# Patient Record
Sex: FEMALE | Race: WHITE | Employment: PART TIME | URBAN - METROPOLITAN AREA
[De-identification: names, ages, dates, MRNs, and addresses within clinical notes are randomized per-mention and may not be internally consistent; named-entity substitution may affect disease eponyms.]

---

## 2017-02-06 ENCOUNTER — OFFICE VISIT (OUTPATIENT)
Dept: FAMILY MEDICINE CLINIC | Age: 57
End: 2017-02-06

## 2017-02-06 VITALS
HEART RATE: 109 BPM | BODY MASS INDEX: 30.35 KG/M2 | RESPIRATION RATE: 16 BRPM | SYSTOLIC BLOOD PRESSURE: 136 MMHG | HEIGHT: 70 IN | DIASTOLIC BLOOD PRESSURE: 84 MMHG | TEMPERATURE: 99.3 F | WEIGHT: 212 LBS

## 2017-02-06 DIAGNOSIS — Z12.31 ENCOUNTER FOR SCREENING MAMMOGRAM FOR BREAST CANCER: ICD-10-CM

## 2017-02-06 DIAGNOSIS — Z11.59 NEED FOR HEPATITIS C SCREENING TEST: ICD-10-CM

## 2017-02-06 DIAGNOSIS — E04.9 ENLARGED THYROID: ICD-10-CM

## 2017-02-06 DIAGNOSIS — Z00.00 ANNUAL PHYSICAL EXAM: Primary | ICD-10-CM

## 2017-02-06 DIAGNOSIS — Z13.220 SCREENING CHOLESTEROL LEVEL: ICD-10-CM

## 2017-02-06 DIAGNOSIS — Z11.4 SCREENING FOR HIV WITHOUT PRESENCE OF RISK FACTORS: ICD-10-CM

## 2017-02-06 DIAGNOSIS — R00.2 PALPITATIONS: ICD-10-CM

## 2017-02-06 DIAGNOSIS — Z13.1 SCREENING FOR DIABETES MELLITUS: ICD-10-CM

## 2017-02-06 LAB
ALBUMIN SERPL-MCNC: 4.4 G/DL (ref 3.9–4.9)
ALP BLD-CCNC: 66 U/L (ref 40–130)
ALT SERPL-CCNC: 13 U/L (ref 0–33)
ANION GAP SERPL CALCULATED.3IONS-SCNC: 12 MEQ/L (ref 7–13)
AST SERPL-CCNC: 15 U/L (ref 0–35)
BILIRUB SERPL-MCNC: 0.3 MG/DL (ref 0–1.2)
BUN BLDV-MCNC: 24 MG/DL (ref 6–20)
CALCIUM SERPL-MCNC: 10.1 MG/DL (ref 8.6–10.2)
CHLORIDE BLD-SCNC: 105 MEQ/L (ref 98–107)
CHOLESTEROL, TOTAL: 209 MG/DL (ref 0–199)
CO2: 25 MEQ/L (ref 22–29)
CREAT SERPL-MCNC: 0.76 MG/DL (ref 0.5–0.9)
GFR AFRICAN AMERICAN: >60
GFR NON-AFRICAN AMERICAN: >60
GLOBULIN: 3 G/DL (ref 2.3–3.5)
GLUCOSE BLD-MCNC: 123 MG/DL (ref 74–109)
HDLC SERPL-MCNC: 69 MG/DL (ref 40–59)
HEPATITIS C ANTIBODY INTERPRETATION: NORMAL
LDL CHOLESTEROL CALCULATED: 115 MG/DL (ref 0–129)
POTASSIUM SERPL-SCNC: 4.1 MEQ/L (ref 3.5–5.1)
SODIUM BLD-SCNC: 142 MEQ/L (ref 132–144)
TOTAL PROTEIN: 7.4 G/DL (ref 6.4–8.1)
TRIGL SERPL-MCNC: 123 MG/DL (ref 0–200)
TSH REFLEX: 1.88 UIU/ML (ref 0.27–4.2)

## 2017-02-06 PROCEDURE — 99386 PREV VISIT NEW AGE 40-64: CPT | Performed by: NURSE PRACTITIONER

## 2017-02-06 RX ORDER — NAPROXEN 500 MG/1
TABLET ORAL
Refills: 1 | COMMUNITY
Start: 2017-02-02 | End: 2017-12-26 | Stop reason: SDUPTHER

## 2017-02-06 ASSESSMENT — ENCOUNTER SYMPTOMS
ALLERGIC/IMMUNOLOGIC NEGATIVE: 1
GASTROINTESTINAL NEGATIVE: 1
WHEEZING: 0
BLOOD IN STOOL: 0
ABDOMINAL PAIN: 0
CONSTIPATION: 0
TROUBLE SWALLOWING: 0
ANAL BLEEDING: 0
SHORTNESS OF BREATH: 0
EYES NEGATIVE: 1
RECTAL PAIN: 0
CHEST TIGHTNESS: 0
COLOR CHANGE: 0
RESPIRATORY NEGATIVE: 1
VOICE CHANGE: 0
DIARRHEA: 0

## 2017-02-07 LAB
HIV-1 AND HIV-2 ANTIBODIES: NEGATIVE
THYROID PEROXIDASE (TPO) ABS: 1.1 IU/ML (ref 0–9)

## 2017-02-16 ENCOUNTER — HOSPITAL ENCOUNTER (OUTPATIENT)
Dept: WOMENS IMAGING | Age: 57
Discharge: HOME | End: 2017-02-16
Payer: COMMERCIAL

## 2017-02-16 ENCOUNTER — HOSPITAL ENCOUNTER (OUTPATIENT)
Dept: ULTRASOUND IMAGING | Age: 57
Discharge: HOME | End: 2017-02-16
Payer: COMMERCIAL

## 2017-02-16 DIAGNOSIS — E04.9 ENLARGED THYROID: ICD-10-CM

## 2017-02-16 DIAGNOSIS — Z12.31 ENCOUNTER FOR SCREENING MAMMOGRAM FOR BREAST CANCER: ICD-10-CM

## 2017-02-16 PROCEDURE — 76536 US EXAM OF HEAD AND NECK: CPT

## 2017-02-16 PROCEDURE — G0202 SCR MAMMO BI INCL CAD: HCPCS

## 2017-03-06 ENCOUNTER — OFFICE VISIT (OUTPATIENT)
Dept: FAMILY MEDICINE CLINIC | Age: 57
End: 2017-03-06

## 2017-03-06 VITALS
HEIGHT: 70 IN | HEART RATE: 72 BPM | WEIGHT: 210 LBS | DIASTOLIC BLOOD PRESSURE: 80 MMHG | BODY MASS INDEX: 30.06 KG/M2 | TEMPERATURE: 98.3 F | SYSTOLIC BLOOD PRESSURE: 138 MMHG | RESPIRATION RATE: 16 BRPM

## 2017-03-06 DIAGNOSIS — K63.5 COLON POLYPS: ICD-10-CM

## 2017-03-06 DIAGNOSIS — Z12.4 CERVICAL CANCER SCREENING: ICD-10-CM

## 2017-03-06 DIAGNOSIS — R73.03 PREDIABETES: ICD-10-CM

## 2017-03-06 DIAGNOSIS — E78.2 MIXED HYPERLIPIDEMIA: ICD-10-CM

## 2017-03-06 DIAGNOSIS — Z12.4 CERVICAL CANCER SCREENING: Primary | ICD-10-CM

## 2017-03-06 PROCEDURE — 99396 PREV VISIT EST AGE 40-64: CPT | Performed by: FAMILY MEDICINE

## 2017-03-14 LAB
HPV COMMENT: ABNORMAL
HPV TYPE 16: NOT DETECTED
HPV TYPE 18: NOT DETECTED
HPVOH (OTHER TYPES): DETECTED

## 2017-05-26 DIAGNOSIS — R73.03 PREDIABETES: Primary | ICD-10-CM

## 2017-05-30 DIAGNOSIS — R73.03 PREDIABETES: ICD-10-CM

## 2017-05-30 LAB
ANION GAP SERPL CALCULATED.3IONS-SCNC: 13 MEQ/L (ref 7–13)
BUN BLDV-MCNC: 16 MG/DL (ref 6–20)
CALCIUM SERPL-MCNC: 9.3 MG/DL (ref 8.6–10.2)
CHLORIDE BLD-SCNC: 107 MEQ/L (ref 98–107)
CO2: 23 MEQ/L (ref 22–29)
CREAT SERPL-MCNC: 0.61 MG/DL (ref 0.5–0.9)
GFR AFRICAN AMERICAN: >60
GFR NON-AFRICAN AMERICAN: >60
GLUCOSE BLD-MCNC: 84 MG/DL (ref 74–109)
INSULIN: 6.4 UIU/ML (ref 2.6–24.9)
POTASSIUM SERPL-SCNC: 4 MEQ/L (ref 3.5–5.1)
SODIUM BLD-SCNC: 143 MEQ/L (ref 132–144)

## 2017-06-05 ENCOUNTER — OFFICE VISIT (OUTPATIENT)
Dept: FAMILY MEDICINE CLINIC | Age: 57
End: 2017-06-05

## 2017-06-05 VITALS
RESPIRATION RATE: 16 BRPM | WEIGHT: 199 LBS | TEMPERATURE: 98.6 F | HEIGHT: 70 IN | BODY MASS INDEX: 28.49 KG/M2 | DIASTOLIC BLOOD PRESSURE: 78 MMHG | HEART RATE: 84 BPM | SYSTOLIC BLOOD PRESSURE: 134 MMHG

## 2017-06-05 DIAGNOSIS — E78.2 MIXED HYPERLIPIDEMIA: Primary | ICD-10-CM

## 2017-06-05 DIAGNOSIS — M17.11 PRIMARY OSTEOARTHRITIS OF RIGHT KNEE: ICD-10-CM

## 2017-06-05 DIAGNOSIS — R73.03 PREDIABETES: ICD-10-CM

## 2017-06-05 PROCEDURE — 3017F COLORECTAL CA SCREEN DOC REV: CPT | Performed by: FAMILY MEDICINE

## 2017-06-05 PROCEDURE — 99214 OFFICE O/P EST MOD 30 MIN: CPT | Performed by: FAMILY MEDICINE

## 2017-06-05 PROCEDURE — G8417 CALC BMI ABV UP PARAM F/U: HCPCS | Performed by: FAMILY MEDICINE

## 2017-06-05 PROCEDURE — 1036F TOBACCO NON-USER: CPT | Performed by: FAMILY MEDICINE

## 2017-06-05 PROCEDURE — 3014F SCREEN MAMMO DOC REV: CPT | Performed by: FAMILY MEDICINE

## 2017-06-05 PROCEDURE — G8427 DOCREV CUR MEDS BY ELIG CLIN: HCPCS | Performed by: FAMILY MEDICINE

## 2017-06-05 ASSESSMENT — PATIENT HEALTH QUESTIONNAIRE - PHQ9
1. LITTLE INTEREST OR PLEASURE IN DOING THINGS: 0
SUM OF ALL RESPONSES TO PHQ QUESTIONS 1-9: 0
2. FEELING DOWN, DEPRESSED OR HOPELESS: 0
SUM OF ALL RESPONSES TO PHQ9 QUESTIONS 1 & 2: 0

## 2017-09-01 ENCOUNTER — PATIENT MESSAGE (OUTPATIENT)
Dept: FAMILY MEDICINE CLINIC | Age: 57
End: 2017-09-01

## 2017-12-08 DIAGNOSIS — E78.2 MIXED HYPERLIPIDEMIA: ICD-10-CM

## 2017-12-08 DIAGNOSIS — R73.03 PREDIABETES: Primary | ICD-10-CM

## 2017-12-09 DIAGNOSIS — E78.2 MIXED HYPERLIPIDEMIA: ICD-10-CM

## 2017-12-09 DIAGNOSIS — R73.03 PREDIABETES: ICD-10-CM

## 2017-12-09 LAB
ALBUMIN SERPL-MCNC: 4.3 G/DL (ref 3.9–4.9)
ALP BLD-CCNC: 61 U/L (ref 40–130)
ALT SERPL-CCNC: 11 U/L (ref 0–33)
ANION GAP SERPL CALCULATED.3IONS-SCNC: 13 MEQ/L (ref 7–13)
AST SERPL-CCNC: 15 U/L (ref 0–35)
BASOPHILS ABSOLUTE: 0.1 K/UL (ref 0–0.2)
BASOPHILS RELATIVE PERCENT: 1.2 %
BILIRUB SERPL-MCNC: 0.2 MG/DL (ref 0–1.2)
BUN BLDV-MCNC: 21 MG/DL (ref 6–20)
CALCIUM SERPL-MCNC: 10.2 MG/DL (ref 8.6–10.2)
CHLORIDE BLD-SCNC: 105 MEQ/L (ref 98–107)
CHOLESTEROL, TOTAL: 211 MG/DL (ref 0–199)
CO2: 27 MEQ/L (ref 22–29)
CREAT SERPL-MCNC: 0.83 MG/DL (ref 0.5–0.9)
EOSINOPHILS ABSOLUTE: 0.1 K/UL (ref 0–0.7)
EOSINOPHILS RELATIVE PERCENT: 2.4 %
GFR AFRICAN AMERICAN: >60
GFR NON-AFRICAN AMERICAN: >60
GLOBULIN: 2.7 G/DL (ref 2.3–3.5)
GLUCOSE BLD-MCNC: 80 MG/DL (ref 74–109)
HBA1C MFR BLD: 5.5 % (ref 4.8–5.9)
HCT VFR BLD CALC: 41.8 % (ref 37–47)
HDLC SERPL-MCNC: 58 MG/DL (ref 40–59)
HEMOGLOBIN: 13.8 G/DL (ref 12–16)
LDL CHOLESTEROL CALCULATED: 138 MG/DL (ref 0–129)
LYMPHOCYTES ABSOLUTE: 1.9 K/UL (ref 1–4.8)
LYMPHOCYTES RELATIVE PERCENT: 33.2 %
MCH RBC QN AUTO: 30.7 PG (ref 27–31.3)
MCHC RBC AUTO-ENTMCNC: 33.1 % (ref 33–37)
MCV RBC AUTO: 92.9 FL (ref 82–100)
MONOCYTES ABSOLUTE: 0.3 K/UL (ref 0.2–0.8)
MONOCYTES RELATIVE PERCENT: 5.1 %
NEUTROPHILS ABSOLUTE: 3.4 K/UL (ref 1.4–6.5)
NEUTROPHILS RELATIVE PERCENT: 58.1 %
PDW BLD-RTO: 14.7 % (ref 11.5–14.5)
PLATELET # BLD: 305 K/UL (ref 130–400)
POTASSIUM SERPL-SCNC: 4.8 MEQ/L (ref 3.5–5.1)
RBC # BLD: 4.5 M/UL (ref 4.2–5.4)
SODIUM BLD-SCNC: 145 MEQ/L (ref 132–144)
TOTAL PROTEIN: 7 G/DL (ref 6.4–8.1)
TRIGL SERPL-MCNC: 73 MG/DL (ref 0–200)
WBC # BLD: 5.9 K/UL (ref 4.8–10.8)

## 2017-12-11 ENCOUNTER — OFFICE VISIT (OUTPATIENT)
Dept: FAMILY MEDICINE CLINIC | Age: 57
End: 2017-12-11

## 2017-12-11 VITALS
TEMPERATURE: 97.9 F | HEART RATE: 78 BPM | HEIGHT: 70 IN | SYSTOLIC BLOOD PRESSURE: 124 MMHG | DIASTOLIC BLOOD PRESSURE: 76 MMHG | WEIGHT: 200 LBS | RESPIRATION RATE: 16 BRPM | BODY MASS INDEX: 28.63 KG/M2

## 2017-12-11 DIAGNOSIS — E78.2 MIXED HYPERLIPIDEMIA: Primary | ICD-10-CM

## 2017-12-11 DIAGNOSIS — R73.03 PREDIABETES: ICD-10-CM

## 2017-12-11 DIAGNOSIS — Z12.31 VISIT FOR SCREENING MAMMOGRAM: ICD-10-CM

## 2017-12-11 PROCEDURE — G8427 DOCREV CUR MEDS BY ELIG CLIN: HCPCS | Performed by: FAMILY MEDICINE

## 2017-12-11 PROCEDURE — 3017F COLORECTAL CA SCREEN DOC REV: CPT | Performed by: FAMILY MEDICINE

## 2017-12-11 PROCEDURE — 99214 OFFICE O/P EST MOD 30 MIN: CPT | Performed by: FAMILY MEDICINE

## 2017-12-11 PROCEDURE — 1036F TOBACCO NON-USER: CPT | Performed by: FAMILY MEDICINE

## 2017-12-11 PROCEDURE — G8419 CALC BMI OUT NRM PARAM NOF/U: HCPCS | Performed by: FAMILY MEDICINE

## 2017-12-11 PROCEDURE — G8484 FLU IMMUNIZE NO ADMIN: HCPCS | Performed by: FAMILY MEDICINE

## 2017-12-11 PROCEDURE — 3014F SCREEN MAMMO DOC REV: CPT | Performed by: FAMILY MEDICINE

## 2017-12-11 NOTE — PROGRESS NOTES
Risk    NCEP Guidelines: Third Report May 2001  >59 = negative risk factor for CHD  <40 = major risk factor for CHD      LDL Calculated 12/09/2017 138* 0 - 129 mg/dL Final    WBC 12/09/2017 5.9  4.8 - 10.8 K/uL Final    RBC 12/09/2017 4.50  4. 20 - 5.40 M/uL Final    Hemoglobin 12/09/2017 13.8  12.0 - 16.0 g/dL Final    Hematocrit 12/09/2017 41.8  37.0 - 47.0 % Final    MCV 12/09/2017 92.9  82.0 - 100.0 fL Final    MCH 12/09/2017 30.7  27.0 - 31.3 pg Final    MCHC 12/09/2017 33.1  33.0 - 37.0 % Final    RDW 12/09/2017 14.7* 11.5 - 14.5 % Final    Platelets 75/05/8185 305  130 - 400 K/uL Final    Neutrophils % 12/09/2017 58.1  % Final    Lymphocytes % 12/09/2017 33.2  % Final    Monocytes % 12/09/2017 5.1  % Final    Eosinophils % 12/09/2017 2.4  % Final    Basophils % 12/09/2017 1.2  % Final    Neutrophils # 12/09/2017 3.4  1.4 - 6.5 K/uL Final    Lymphocytes # 12/09/2017 1.9  1.0 - 4.8 K/uL Final    Monocytes # 12/09/2017 0.3  0.2 - 0.8 K/uL Final    Eosinophils # 12/09/2017 0.1  0.0 - 0.7 K/uL Final    Basophils # 12/09/2017 0.1  0.0 - 0.2 K/uL Final    Hemoglobin A1C 12/09/2017 5.5  4.8 - 5.9 % Final     Health Maintenance   Topic Date Due    DTaP/Tdap/Td vaccine (1 - Tdap) 06/11/2018 (Originally 3/26/1979)    Flu vaccine (1) 12/11/2018 (Originally 9/1/2017)    Breast cancer screen  02/16/2019    Cervical cancer screen  03/06/2022    Lipid screen  12/09/2022    Colon cancer screen colonoscopy  01/23/2027    Hepatitis C screen  Completed    HIV screen  Completed       No results found for this visit on 12/11/17. Objective    Vitals:    12/11/17 1124   BP: 124/76   Pulse: 78   Resp: 16   Temp: 97.9 °F (36.6 °C)   TempSrc: Oral   Weight: 200 lb (90.7 kg)   Height: 5' 10\" (1.778 m)       PHYSICAL EXAMINATION:        GENERAL:    The patient appears well nourished and well-developed,     Normal affect. Not appearing significantly anxious or depressed.     No acute respiratory

## 2017-12-26 ENCOUNTER — PATIENT MESSAGE (OUTPATIENT)
Dept: FAMILY MEDICINE CLINIC | Age: 57
End: 2017-12-26

## 2017-12-26 RX ORDER — NAPROXEN 500 MG/1
TABLET ORAL
Qty: 60 TABLET | Refills: 3 | Status: SHIPPED | OUTPATIENT
Start: 2017-12-26 | End: 2018-10-05 | Stop reason: SDUPTHER

## 2018-02-19 ENCOUNTER — HOSPITAL ENCOUNTER (OUTPATIENT)
Dept: WOMENS IMAGING | Age: 58
Discharge: HOME | End: 2018-02-21
Payer: COMMERCIAL

## 2018-02-19 DIAGNOSIS — Z12.31 VISIT FOR SCREENING MAMMOGRAM: ICD-10-CM

## 2018-02-19 PROCEDURE — 77063 BREAST TOMOSYNTHESIS BI: CPT

## 2018-03-02 ENCOUNTER — PATIENT MESSAGE (OUTPATIENT)
Dept: FAMILY MEDICINE CLINIC | Age: 58
End: 2018-03-02

## 2018-03-02 DIAGNOSIS — R73.03 PREDIABETES: Primary | ICD-10-CM

## 2018-06-26 ENCOUNTER — OFFICE VISIT (OUTPATIENT)
Dept: FAMILY MEDICINE CLINIC | Age: 58
End: 2018-06-26
Payer: COMMERCIAL

## 2018-06-26 VITALS
HEART RATE: 80 BPM | BODY MASS INDEX: 29.92 KG/M2 | WEIGHT: 209 LBS | TEMPERATURE: 97.4 F | HEIGHT: 70 IN | SYSTOLIC BLOOD PRESSURE: 140 MMHG | DIASTOLIC BLOOD PRESSURE: 82 MMHG | RESPIRATION RATE: 16 BRPM

## 2018-06-26 DIAGNOSIS — R73.03 PREDIABETES: ICD-10-CM

## 2018-06-26 DIAGNOSIS — M25.50 ARTHRALGIA, UNSPECIFIED JOINT: ICD-10-CM

## 2018-06-26 DIAGNOSIS — E78.2 MIXED HYPERLIPIDEMIA: ICD-10-CM

## 2018-06-26 DIAGNOSIS — R73.03 PREDIABETES: Primary | ICD-10-CM

## 2018-06-26 LAB
ALBUMIN SERPL-MCNC: 4.3 G/DL (ref 3.9–4.9)
ALP BLD-CCNC: 68 U/L (ref 40–130)
ALT SERPL-CCNC: 15 U/L (ref 0–33)
ANION GAP SERPL CALCULATED.3IONS-SCNC: 17 MEQ/L (ref 7–13)
AST SERPL-CCNC: 18 U/L (ref 0–35)
BILIRUB SERPL-MCNC: <0.2 MG/DL (ref 0–1.2)
BUN BLDV-MCNC: 21 MG/DL (ref 8–23)
CALCIUM SERPL-MCNC: 10.1 MG/DL (ref 8.6–10.2)
CHLORIDE BLD-SCNC: 103 MEQ/L (ref 98–107)
CHOLESTEROL, TOTAL: 225 MG/DL (ref 0–199)
CO2: 23 MEQ/L (ref 22–29)
CREAT SERPL-MCNC: 0.81 MG/DL (ref 0.5–0.9)
GFR AFRICAN AMERICAN: >60
GFR NON-AFRICAN AMERICAN: >60
GLOBULIN: 3.1 G/DL (ref 2.3–3.5)
GLUCOSE BLD-MCNC: 75 MG/DL (ref 74–109)
HBA1C MFR BLD: 5.5 % (ref 4.8–5.9)
HCT VFR BLD CALC: 41.1 % (ref 37–47)
HDLC SERPL-MCNC: 65 MG/DL (ref 40–59)
HEMOGLOBIN: 13.4 G/DL (ref 12–16)
LACTATE DEHYDROGENASE: 177 U/L (ref 135–214)
LDL CHOLESTEROL CALCULATED: 132 MG/DL (ref 0–129)
MCH RBC QN AUTO: 29.9 PG (ref 27–31.3)
MCHC RBC AUTO-ENTMCNC: 32.6 % (ref 33–37)
MCV RBC AUTO: 91.6 FL (ref 82–100)
PDW BLD-RTO: 14.2 % (ref 11.5–14.5)
PLATELET # BLD: 296 K/UL (ref 130–400)
POTASSIUM SERPL-SCNC: 3.8 MEQ/L (ref 3.5–5.1)
RBC # BLD: 4.49 M/UL (ref 4.2–5.4)
RHEUMATOID FACTOR: <10 IU/ML (ref 0–14)
SEDIMENTATION RATE, ERYTHROCYTE: 18 MM (ref 0–30)
SODIUM BLD-SCNC: 143 MEQ/L (ref 132–144)
T4 FREE: 1.26 NG/DL (ref 0.93–1.7)
TOTAL PROTEIN: 7.4 G/DL (ref 6.4–8.1)
TRIGL SERPL-MCNC: 139 MG/DL (ref 0–200)
TSH SERPL DL<=0.05 MIU/L-ACNC: 2.22 UIU/ML (ref 0.27–4.2)
WBC # BLD: 5.7 K/UL (ref 4.8–10.8)

## 2018-06-26 PROCEDURE — G8427 DOCREV CUR MEDS BY ELIG CLIN: HCPCS | Performed by: FAMILY MEDICINE

## 2018-06-26 PROCEDURE — 1036F TOBACCO NON-USER: CPT | Performed by: FAMILY MEDICINE

## 2018-06-26 PROCEDURE — 3017F COLORECTAL CA SCREEN DOC REV: CPT | Performed by: FAMILY MEDICINE

## 2018-06-26 PROCEDURE — G8419 CALC BMI OUT NRM PARAM NOF/U: HCPCS | Performed by: FAMILY MEDICINE

## 2018-06-26 PROCEDURE — 99214 OFFICE O/P EST MOD 30 MIN: CPT | Performed by: FAMILY MEDICINE

## 2018-06-26 ASSESSMENT — PATIENT HEALTH QUESTIONNAIRE - PHQ9
SUM OF ALL RESPONSES TO PHQ9 QUESTIONS 1 & 2: 1
2. FEELING DOWN, DEPRESSED OR HOPELESS: 1
SUM OF ALL RESPONSES TO PHQ QUESTIONS 1-9: 1
1. LITTLE INTEREST OR PLEASURE IN DOING THINGS: 0

## 2018-06-27 LAB
ANA INTERPRETATION: NORMAL
ANTI-NUCLEAR ANTIBODY (ANA): NEGATIVE

## 2018-08-27 ENCOUNTER — OFFICE VISIT (OUTPATIENT)
Dept: FAMILY MEDICINE CLINIC | Age: 58
End: 2018-08-27
Payer: COMMERCIAL

## 2018-08-27 ENCOUNTER — PATIENT MESSAGE (OUTPATIENT)
Dept: FAMILY MEDICINE CLINIC | Age: 58
End: 2018-08-27

## 2018-08-27 VITALS
TEMPERATURE: 98.1 F | HEART RATE: 76 BPM | SYSTOLIC BLOOD PRESSURE: 118 MMHG | WEIGHT: 205 LBS | DIASTOLIC BLOOD PRESSURE: 64 MMHG | BODY MASS INDEX: 29.35 KG/M2 | RESPIRATION RATE: 16 BRPM | HEIGHT: 70 IN

## 2018-08-27 DIAGNOSIS — R73.03 PREDIABETES: Primary | ICD-10-CM

## 2018-08-27 DIAGNOSIS — E78.2 MIXED HYPERLIPIDEMIA: ICD-10-CM

## 2018-08-27 PROCEDURE — G8427 DOCREV CUR MEDS BY ELIG CLIN: HCPCS | Performed by: FAMILY MEDICINE

## 2018-08-27 PROCEDURE — 3017F COLORECTAL CA SCREEN DOC REV: CPT | Performed by: FAMILY MEDICINE

## 2018-08-27 PROCEDURE — 99214 OFFICE O/P EST MOD 30 MIN: CPT | Performed by: FAMILY MEDICINE

## 2018-08-27 PROCEDURE — 1036F TOBACCO NON-USER: CPT | Performed by: FAMILY MEDICINE

## 2018-08-27 PROCEDURE — G8419 CALC BMI OUT NRM PARAM NOF/U: HCPCS | Performed by: FAMILY MEDICINE

## 2018-08-27 NOTE — PROGRESS NOTES
NICOLE  does occur in approximately 3-5% of patients with otherwise  typical SLE. Many of the latter patients have anti-SSA (Ro)  and/or anti-SSB(La)antibodies. If still clinically suspicious of SLE and the NICOLE is negative,  consider repeating the test in 3-6 months, particularly if  the clinical course changes. Sjogren's syndrome, scleroderma, rheumatoid arthritis, and  dermato/polymyositis cannot be excluded on the basis of a  negative NICOLE test.  Tests appropriate for those disorders  should be considered if the clinical history, symptoms, and  physical findings are consistent with these diagnoses. Anti-SSA and anti-SSB are helpful in the evaluation of  Sjogren's syndrome, anti-Scl-70 for scleroderma, and  anti-Felisa-1 for dermato/polymyositis.  WBC 06/26/2018 5.7  4.8 - 10.8 K/uL Final    RBC 06/26/2018 4.49  4.20 - 5.40 M/uL Final    Hemoglobin 06/26/2018 13.4  12.0 - 16.0 g/dL Final    Hematocrit 06/26/2018 41.1  37.0 - 47.0 % Final    MCV 06/26/2018 91.6  82.0 - 100.0 fL Final    MCH 06/26/2018 29.9  27.0 - 31.3 pg Final    MCHC 06/26/2018 32.6* 33.0 - 37.0 % Final    RDW 06/26/2018 14.2  11.5 - 14.5 % Final    Platelets 44/64/6547 296  130 - 400 K/uL Final    Sodium 06/26/2018 143  132 - 144 mEq/L Final    Potassium 06/26/2018 3.8  3.5 - 5.1 mEq/L Final    Chloride 06/26/2018 103  98 - 107 mEq/L Final    CO2 06/26/2018 23  22 - 29 mEq/L Final    Anion Gap 06/26/2018 17* 7 - 13 mEq/L Final    Glucose 06/26/2018 75  74 - 109 mg/dL Final    BUN 06/26/2018 21  8 - 23 mg/dL Final    CREATININE 06/26/2018 0.81  0.50 - 0.90 mg/dL Final    GFR Non- 06/26/2018 >60.0  >60 Final    Comment: >60 mL/min/1.73m2 EGFR, calc. for ages 25 and older using the  MDRD formula (not corrected for weight), is valid for stable  renal function.       GFR  06/26/2018 >60.0  >60 Final    Comment: >60 mL/min/1.73m2 EGFR, calc. for ages 25 and older using the  MDRD formula (not

## 2018-08-28 NOTE — TELEPHONE ENCOUNTER
From: Allyssa Sterling  To: Pradeep Sewell MD  Sent: 8/27/2018 5:53 PM EDT  Subject: Visit Follow-Up Question    Hi  Is there a nutritionist that i can talk to about understanding the information about carb timing? Or perhaps some references online that you could point me to? I want to do more and understand more about this. Thank you! Chelseadionicio Mata.

## 2018-10-03 ENCOUNTER — PATIENT MESSAGE (OUTPATIENT)
Dept: FAMILY MEDICINE CLINIC | Age: 58
End: 2018-10-03

## 2018-10-04 NOTE — TELEPHONE ENCOUNTER
From: Alejandro Camacho  To: Marylou Rivera MD  Sent: 10/3/2018 10:13 PM EDT  Subject: Prescription Question    Can i get a refill on the naproxen? I only use it periodically.  Thank you

## 2018-10-05 RX ORDER — NAPROXEN 500 MG/1
TABLET ORAL
Qty: 60 TABLET | Refills: 3 | Status: SHIPPED | OUTPATIENT
Start: 2018-10-05 | End: 2019-06-06

## 2018-11-15 ENCOUNTER — NURSE ONLY (OUTPATIENT)
Dept: FAMILY MEDICINE CLINIC | Age: 58
End: 2018-11-15
Payer: COMMERCIAL

## 2018-11-15 DIAGNOSIS — Z23 NEED FOR INFLUENZA VACCINATION: Primary | ICD-10-CM

## 2018-11-15 DIAGNOSIS — Z23 NEED FOR TDAP VACCINATION: ICD-10-CM

## 2018-11-15 PROCEDURE — 90472 IMMUNIZATION ADMIN EACH ADD: CPT | Performed by: FAMILY MEDICINE

## 2018-11-15 PROCEDURE — 90471 IMMUNIZATION ADMIN: CPT | Performed by: FAMILY MEDICINE

## 2018-11-15 PROCEDURE — 90688 IIV4 VACCINE SPLT 0.5 ML IM: CPT | Performed by: FAMILY MEDICINE

## 2018-11-15 PROCEDURE — 90715 TDAP VACCINE 7 YRS/> IM: CPT | Performed by: FAMILY MEDICINE

## 2018-12-06 DIAGNOSIS — R73.03 PREDIABETES: ICD-10-CM

## 2018-12-06 DIAGNOSIS — E78.2 MIXED HYPERLIPIDEMIA: Primary | ICD-10-CM

## 2018-12-06 DIAGNOSIS — E78.2 MIXED HYPERLIPIDEMIA: ICD-10-CM

## 2018-12-06 LAB
ALBUMIN SERPL-MCNC: 4 G/DL (ref 3.9–4.9)
ALP BLD-CCNC: 63 U/L (ref 40–130)
ALT SERPL-CCNC: 12 U/L (ref 0–33)
ANION GAP SERPL CALCULATED.3IONS-SCNC: 12 MEQ/L (ref 7–13)
AST SERPL-CCNC: 22 U/L (ref 0–35)
BILIRUB SERPL-MCNC: 0.4 MG/DL (ref 0–1.2)
BUN BLDV-MCNC: 19 MG/DL (ref 8–23)
CALCIUM SERPL-MCNC: 9.9 MG/DL (ref 8.6–10.2)
CHLORIDE BLD-SCNC: 105 MEQ/L (ref 98–107)
CHOLESTEROL, TOTAL: 195 MG/DL (ref 0–199)
CO2: 25 MEQ/L (ref 22–29)
CREAT SERPL-MCNC: 0.63 MG/DL (ref 0.5–0.9)
GFR AFRICAN AMERICAN: >60
GFR NON-AFRICAN AMERICAN: >60
GLOBULIN: 3.4 G/DL (ref 2.3–3.5)
GLUCOSE BLD-MCNC: 87 MG/DL (ref 74–109)
HBA1C MFR BLD: 5.6 % (ref 4.8–5.9)
HDLC SERPL-MCNC: 70 MG/DL (ref 40–59)
LDL CHOLESTEROL CALCULATED: 112 MG/DL (ref 0–129)
POTASSIUM SERPL-SCNC: 4.6 MEQ/L (ref 3.5–5.1)
SODIUM BLD-SCNC: 142 MEQ/L (ref 132–144)
TOTAL PROTEIN: 7.4 G/DL (ref 6.4–8.1)
TRIGL SERPL-MCNC: 63 MG/DL (ref 0–200)

## 2018-12-10 ENCOUNTER — OFFICE VISIT (OUTPATIENT)
Dept: FAMILY MEDICINE CLINIC | Age: 58
End: 2018-12-10
Payer: COMMERCIAL

## 2018-12-10 VITALS
BODY MASS INDEX: 29.41 KG/M2 | HEART RATE: 90 BPM | TEMPERATURE: 97.7 F | RESPIRATION RATE: 16 BRPM | HEIGHT: 70 IN | DIASTOLIC BLOOD PRESSURE: 82 MMHG | SYSTOLIC BLOOD PRESSURE: 136 MMHG

## 2018-12-10 DIAGNOSIS — E78.2 MIXED HYPERLIPIDEMIA: ICD-10-CM

## 2018-12-10 DIAGNOSIS — R73.03 PREDIABETES: Primary | ICD-10-CM

## 2018-12-10 PROCEDURE — 1036F TOBACCO NON-USER: CPT | Performed by: FAMILY MEDICINE

## 2018-12-10 PROCEDURE — G8417 CALC BMI ABV UP PARAM F/U: HCPCS | Performed by: FAMILY MEDICINE

## 2018-12-10 PROCEDURE — G8482 FLU IMMUNIZE ORDER/ADMIN: HCPCS | Performed by: FAMILY MEDICINE

## 2018-12-10 PROCEDURE — 99214 OFFICE O/P EST MOD 30 MIN: CPT | Performed by: FAMILY MEDICINE

## 2018-12-10 PROCEDURE — G8427 DOCREV CUR MEDS BY ELIG CLIN: HCPCS | Performed by: FAMILY MEDICINE

## 2018-12-10 PROCEDURE — 3017F COLORECTAL CA SCREEN DOC REV: CPT | Performed by: FAMILY MEDICINE

## 2018-12-29 ENCOUNTER — TELEPHONE (OUTPATIENT)
Dept: FAMILY MEDICINE CLINIC | Age: 58
End: 2018-12-29

## 2019-01-05 ENCOUNTER — OFFICE VISIT (OUTPATIENT)
Dept: FAMILY MEDICINE CLINIC | Age: 59
End: 2019-01-05

## 2019-01-05 VITALS
TEMPERATURE: 98.4 F | RESPIRATION RATE: 16 BRPM | DIASTOLIC BLOOD PRESSURE: 82 MMHG | HEIGHT: 70 IN | HEART RATE: 88 BPM | SYSTOLIC BLOOD PRESSURE: 154 MMHG | BODY MASS INDEX: 29.41 KG/M2

## 2019-01-05 DIAGNOSIS — L98.8 AGE-RELATED FACIAL WRINKLES: Primary | ICD-10-CM

## 2019-02-04 ENCOUNTER — HOSPITAL ENCOUNTER (OUTPATIENT)
Dept: NUTRITION | Age: 59
Discharge: HOME | End: 2019-02-04
Payer: COMMERCIAL

## 2019-02-04 PROCEDURE — 97802 MEDICAL NUTRITION INDIV IN: CPT

## 2019-02-21 ENCOUNTER — PATIENT MESSAGE (OUTPATIENT)
Dept: FAMILY MEDICINE CLINIC | Age: 59
End: 2019-02-21

## 2019-02-21 DIAGNOSIS — R63.5 WEIGHT GAIN: Primary | ICD-10-CM

## 2019-02-28 ENCOUNTER — HOSPITAL ENCOUNTER (OUTPATIENT)
Dept: MRI IMAGING | Age: 59
Discharge: HOME | End: 2019-03-02
Payer: COMMERCIAL

## 2019-02-28 DIAGNOSIS — M17.11 OSTEOARTHRITIS OF RIGHT KNEE, UNSPECIFIED OSTEOARTHRITIS TYPE: ICD-10-CM

## 2019-02-28 PROCEDURE — 73721 MRI JNT OF LWR EXTRE W/O DYE: CPT

## 2019-03-19 ENCOUNTER — TELEPHONE (OUTPATIENT)
Dept: FAMILY MEDICINE CLINIC | Age: 59
End: 2019-03-19

## 2019-04-04 ENCOUNTER — OFFICE VISIT (OUTPATIENT)
Dept: ENDOCRINOLOGY | Age: 59
End: 2019-04-04
Payer: COMMERCIAL

## 2019-04-04 VITALS
HEIGHT: 70 IN | SYSTOLIC BLOOD PRESSURE: 125 MMHG | WEIGHT: 216 LBS | DIASTOLIC BLOOD PRESSURE: 85 MMHG | BODY MASS INDEX: 30.92 KG/M2 | HEART RATE: 81 BPM

## 2019-04-04 DIAGNOSIS — E66.9 OBESITY (BMI 30-39.9): Primary | ICD-10-CM

## 2019-04-04 DIAGNOSIS — R63.5 WEIGHT GAIN: ICD-10-CM

## 2019-04-04 PROCEDURE — 1036F TOBACCO NON-USER: CPT | Performed by: INTERNAL MEDICINE

## 2019-04-04 PROCEDURE — 99202 OFFICE O/P NEW SF 15 MIN: CPT | Performed by: INTERNAL MEDICINE

## 2019-04-04 PROCEDURE — 3017F COLORECTAL CA SCREEN DOC REV: CPT | Performed by: INTERNAL MEDICINE

## 2019-04-04 PROCEDURE — G8427 DOCREV CUR MEDS BY ELIG CLIN: HCPCS | Performed by: INTERNAL MEDICINE

## 2019-04-04 PROCEDURE — G8417 CALC BMI ABV UP PARAM F/U: HCPCS | Performed by: INTERNAL MEDICINE

## 2019-04-04 RX ORDER — VITAMIN B COMPLEX
1 CAPSULE ORAL DAILY
COMMUNITY

## 2019-04-04 RX ORDER — LORATADINE 10 MG/1
10 TABLET ORAL DAILY
COMMUNITY

## 2019-04-04 RX ORDER — OYSTER SHELL CALCIUM WITH VITAMIN D 500; 200 MG/1; [IU]/1
1 TABLET, FILM COATED ORAL DAILY
COMMUNITY

## 2019-04-04 RX ORDER — PHENTERMINE HYDROCHLORIDE 37.5 MG/1
37.5 TABLET ORAL
Qty: 30 TABLET | Refills: 0 | Status: SHIPPED | OUTPATIENT
Start: 2019-04-04 | End: 2019-05-04

## 2019-04-04 RX ORDER — CHLORAL HYDRATE 500 MG
3000 CAPSULE ORAL 3 TIMES DAILY
COMMUNITY

## 2019-04-04 NOTE — PROGRESS NOTES
Subjective  Janelle Oliva, 59 y.o. female presents today with:  Chief Complaint   Patient presents with    Hyperglycemia    Hyperlipidemia     ldl 112        Past Medical History:   Diagnosis Date    Colon polyps     Prediabetes      Past Surgical History:   Procedure Laterality Date    COLONOSCOPY  8/13/13    DR. CHEN, polyps needs 2016    COLONOSCOPY  01/23/2017    DR. CHEN     COLPOSCOPY  2010    ENDOMETRIAL ABLATION       Social History     Social History    Marital status:      Spouse name: N/A    Number of children: N/A    Years of education: N/A     Occupational History    Not on file. Social History Main Topics    Smoking status: Never Smoker    Smokeless tobacco: Never Used    Alcohol use Yes    Drug use: Yes      Comment: minimal    Sexual activity: Yes     Other Topics Concern    Not on file     Social History Narrative    No narrative on file     No family history on file. Allergies   Allergen Reactions    Cortisone Anxiety, Other (See Comments), Palpitations and Shortness Of Breath    Meperidine Other (See Comments)     dizziness    Metformin And Related      achy    Shellfish Allergy Diarrhea and Nausea And Vomiting     Current Outpatient Prescriptions   Medication Sig Dispense Refill    naproxen (NAPROSYN) 500 MG tablet TAKE ONE TABLET BY MOUTH TWO TIMES A DAY WITH MEALS 60 tablet 3     No current facility-administered medications for this visit. The patient denies any history of      seizures,             heart attack or KNOWN CAD        or stroke. No chest pain, shortness of breath, paroxysmal nocturnal dyspnea. No nausea, vomiting, diarrhea, hematochezia or melena. No paresthesias or headaches. No dysuria, frequency or hematuria.       Last labs  Orders Only on 12/06/2018   Component Date Value Ref Range Status    Hemoglobin A1C 12/06/2018 5.6  4.8 - 5.9 % Final    Cholesterol, Total 12/06/2018 195  0 - 199 mg/dL Final    ATP III no

## 2019-04-05 NOTE — PROGRESS NOTES
Subjective:      Patient ID: Veronika Christiansen is a 62 y.o. female. Pt referred for weight gain/obesity   Other   This is a new (obesity) problem. The current episode started more than 1 year ago. The problem has been waxing and waning. Associated symptoms include arthralgias and fatigue. Associated symptoms comments: Prediabetes . The symptoms are aggravated by eating and stress. Body mass index is 30.99 kg/m². Results for Daniel King (MRN [id number]) as of 4/10/2019 06:55   Ref.  Range 12/6/2018 08:48   Sodium Latest Ref Range: 132 - 144 mEq/L 142   Potassium Latest Ref Range: 3.5 - 5.1 mEq/L 4.6   Chloride Latest Ref Range: 98 - 107 mEq/L 105   CO2 Latest Ref Range: 22 - 29 mEq/L 25   BUN Latest Ref Range: 8 - 23 mg/dL 19   Creatinine Latest Ref Range: 0.50 - 0.90 mg/dL 0.63   Anion Gap Latest Ref Range: 7 - 13 mEq/L 12   GFR Non- Latest Ref Range: >60  >60.0   GFR African American Latest Ref Range: >60  >60.0   Glucose Latest Ref Range: 74 - 109 mg/dL 87   Calcium Latest Ref Range: 8.6 - 10.2 mg/dL 9.9   Total Protein Latest Ref Range: 6.4 - 8.1 g/dL 7.4   Cholesterol, Total Latest Ref Range: 0 - 199 mg/dL 195   HDL Cholesterol Latest Ref Range: 40 - 59 mg/dL 70 (H)   LDL Calculated Latest Ref Range: 0 - 129 mg/dL 112   Triglycerides Latest Ref Range: 0 - 200 mg/dL 63   Albumin Latest Ref Range: 3.9 - 4.9 g/dL 4.0   Globulin Latest Ref Range: 2.3 - 3.5 g/dL 3.4   Alk Phos Latest Ref Range: 40 - 130 U/L 63   ALT Latest Ref Range: 0 - 33 U/L 12   AST Latest Ref Range: 0 - 35 U/L 22   Bilirubin Latest Ref Range: 0.0 - 1.2 mg/dL 0.4   Hemoglobin A1C Latest Ref Range: 4.8 - 5.9 % 5.6       Patient Active Problem List   Diagnosis    Mixed hyperlipidemia    Colon polyps    Prediabetes     Social History     Socioeconomic History    Marital status:      Spouse name: Not on file    Number of children: Not on file    Years of education: Not on file    Highest education level: Not on file   Occupational History    Not on file   Social Needs    Financial resource strain: Not on file    Food insecurity:     Worry: Not on file     Inability: Not on file    Transportation needs:     Medical: Not on file     Non-medical: Not on file   Tobacco Use    Smoking status: Never Smoker    Smokeless tobacco: Never Used   Substance and Sexual Activity    Alcohol use: Yes    Drug use: Yes     Comment: minimal    Sexual activity: Yes   Lifestyle    Physical activity:     Days per week: Not on file     Minutes per session: Not on file    Stress: Not on file   Relationships    Social connections:     Talks on phone: Not on file     Gets together: Not on file     Attends Spiritism service: Not on file     Active member of club or organization: Not on file     Attends meetings of clubs or organizations: Not on file     Relationship status: Not on file    Intimate partner violence:     Fear of current or ex partner: Not on file     Emotionally abused: Not on file     Physically abused: Not on file     Forced sexual activity: Not on file   Other Topics Concern    Not on file   Social History Narrative    Not on file     Past Surgical History:   Procedure Laterality Date    COLONOSCOPY  8/13/13    DR. CHEN, polyps needs 2016    COLONOSCOPY  01/23/2017    DR. CHEN     COLPOSCOPY  2010    ENDOMETRIAL ABLATION       No family history on file.     Allergies   Allergen Reactions    Cortisone Anxiety, Other (See Comments), Palpitations and Shortness Of Breath    Meperidine Other (See Comments)     dizziness    Metformin And Related      achy    Shellfish Allergy Diarrhea and Nausea And Vomiting       Current Outpatient Medications:     Omega-3 Fatty Acids (FISH OIL) 1000 MG CAPS, Take 3,000 mg by mouth 3 times daily, Disp: , Rfl:     loratadine (CLEAR-ATADINE) 10 MG tablet, Take 10 mg by mouth daily, Disp: , Rfl:     calcium-vitamin D (CALCIUM 500/D) 500-200 MG-UNIT per tablet, Take 1 tablet by mouth daily, Disp: , Rfl:     b complex vitamins capsule, Take 1 capsule by mouth daily, Disp: , Rfl:     phentermine (ADIPEX-P) 37.5 MG tablet, Take 1 tablet by mouth every morning (before breakfast) for 30 days. , Disp: 30 tablet, Rfl: 0    naproxen-esomeprazole (VIMOVO) 500-20 MG TBEC, Take 1 tablet by mouth 2 times daily as needed (pain), Disp: 60 tablet, Rfl: 5    naproxen (NAPROSYN) 500 MG tablet, TAKE ONE TABLET BY MOUTH TWO TIMES A DAY WITH MEALS, Disp: 60 tablet, Rfl: 3    Review of Systems   Constitutional: Positive for fatigue and unexpected weight change. Cardiovascular: Negative. Endocrine: Negative. Musculoskeletal: Positive for arthralgias. All other systems reviewed and are negative. Vitals:    04/04/19 0915   BP: 125/85   Pulse: 81   Weight: 216 lb (98 kg)   Height: 5' 10\" (1.778 m)       Objective:   Physical Exam   Constitutional: She appears well-developed and well-nourished. HENT:   Head: Normocephalic and atraumatic. Right Ear: External ear normal.   Left Ear: External ear normal.   Eyes: Conjunctivae and EOM are normal. Right eye exhibits no discharge. Left eye exhibits no discharge. No scleral icterus. Neck: Neck supple. No thyromegaly present. Cardiovascular: Normal rate, regular rhythm and normal heart sounds. Pulmonary/Chest: Breath sounds normal.   Abdominal:   Obese    Musculoskeletal: Normal range of motion. She exhibits no edema. Lymphadenopathy:     She has no cervical adenopathy. Neurological: She is alert. Skin: Skin is warm and dry. Psychiatric: She has a normal mood and affect. Assessment:       Diagnosis Orders   1. Obesity (BMI 30-39.9)  phentermine (ADIPEX-P) 37.5 MG tablet   2. Weight gain             Plan:      Continue current physical activity  Orders Placed This Encounter   Medications    phentermine (ADIPEX-P) 37.5 MG tablet     Sig: Take 1 tablet by mouth every morning (before breakfast) for 30 days.      Dispense:  30 tablet     Refill: 0     BMI target less than 28  F/u in 4 weeks         Mireya Rosenthal MD

## 2019-06-06 ENCOUNTER — HOSPITAL ENCOUNTER (OUTPATIENT)
Dept: PREADMISSION TESTING | Age: 59
Discharge: HOME | DRG: 470 | End: 2019-06-10
Payer: COMMERCIAL

## 2019-06-06 VITALS
WEIGHT: 200 LBS | BODY MASS INDEX: 29.62 KG/M2 | DIASTOLIC BLOOD PRESSURE: 64 MMHG | TEMPERATURE: 97.6 F | HEIGHT: 69 IN | OXYGEN SATURATION: 97 % | RESPIRATION RATE: 16 BRPM | SYSTOLIC BLOOD PRESSURE: 133 MMHG | HEART RATE: 87 BPM

## 2019-06-06 LAB
ABO/RH: NORMAL
ANTIBODY SCREEN: NORMAL

## 2019-06-06 PROCEDURE — 86901 BLOOD TYPING SEROLOGIC RH(D): CPT

## 2019-06-06 PROCEDURE — 86850 RBC ANTIBODY SCREEN: CPT

## 2019-06-06 PROCEDURE — 86900 BLOOD TYPING SEROLOGIC ABO: CPT

## 2019-06-06 RX ORDER — SODIUM CHLORIDE 0.9 % (FLUSH) 0.9 %
10 SYRINGE (ML) INJECTION EVERY 12 HOURS SCHEDULED
Status: CANCELLED | OUTPATIENT
Start: 2019-06-11

## 2019-06-06 RX ORDER — SODIUM CHLORIDE 0.9 % (FLUSH) 0.9 %
10 SYRINGE (ML) INJECTION PRN
Status: CANCELLED | OUTPATIENT
Start: 2019-06-11

## 2019-06-06 RX ORDER — ACETAMINOPHEN 500 MG
500 TABLET ORAL EVERY 6 HOURS PRN
COMMUNITY

## 2019-06-06 RX ORDER — LIDOCAINE HYDROCHLORIDE 10 MG/ML
1 INJECTION, SOLUTION EPIDURAL; INFILTRATION; INTRACAUDAL; PERINEURAL
Status: CANCELLED | OUTPATIENT
Start: 2019-06-11 | End: 2019-06-11

## 2019-06-06 RX ORDER — CEFAZOLIN SODIUM 2 G/50ML
2 SOLUTION INTRAVENOUS ONCE
Status: CANCELLED | OUTPATIENT
Start: 2019-06-11

## 2019-06-06 RX ORDER — SODIUM CHLORIDE, SODIUM LACTATE, POTASSIUM CHLORIDE, CALCIUM CHLORIDE 600; 310; 30; 20 MG/100ML; MG/100ML; MG/100ML; MG/100ML
INJECTION, SOLUTION INTRAVENOUS CONTINUOUS
Status: CANCELLED | OUTPATIENT
Start: 2019-06-11

## 2019-06-11 ENCOUNTER — ANESTHESIA EVENT (OUTPATIENT)
Dept: OPERATING ROOM | Age: 59
DRG: 470 | End: 2019-06-11
Payer: COMMERCIAL

## 2019-06-11 ENCOUNTER — APPOINTMENT (OUTPATIENT)
Dept: GENERAL RADIOLOGY | Age: 59
DRG: 470 | End: 2019-06-11
Attending: ORTHOPAEDIC SURGERY
Payer: COMMERCIAL

## 2019-06-11 ENCOUNTER — ANESTHESIA (OUTPATIENT)
Dept: OPERATING ROOM | Age: 59
DRG: 470 | End: 2019-06-11
Payer: COMMERCIAL

## 2019-06-11 ENCOUNTER — HOSPITAL ENCOUNTER (INPATIENT)
Age: 59
LOS: 1 days | Discharge: HOME HEALTH | DRG: 470 | End: 2019-06-12
Attending: ORTHOPAEDIC SURGERY | Admitting: ORTHOPAEDIC SURGERY
Payer: COMMERCIAL

## 2019-06-11 VITALS — DIASTOLIC BLOOD PRESSURE: 78 MMHG | SYSTOLIC BLOOD PRESSURE: 140 MMHG | TEMPERATURE: 94.5 F | OXYGEN SATURATION: 95 %

## 2019-06-11 DIAGNOSIS — Z96.651 H/O TOTAL KNEE REPLACEMENT, RIGHT: Primary | ICD-10-CM

## 2019-06-11 LAB
GLUCOSE BLD-MCNC: 93 MG/DL (ref 60–115)
PERFORMED ON: NORMAL

## 2019-06-11 PROCEDURE — 0SRC0J9 REPLACEMENT OF RIGHT KNEE JOINT WITH SYNTHETIC SUBSTITUTE, CEMENTED, OPEN APPROACH: ICD-10-PCS | Performed by: ORTHOPAEDIC SURGERY

## 2019-06-11 PROCEDURE — 2580000003 HC RX 258: Performed by: NURSE ANESTHETIST, CERTIFIED REGISTERED

## 2019-06-11 PROCEDURE — 97535 SELF CARE MNGMENT TRAINING: CPT

## 2019-06-11 PROCEDURE — 2580000003 HC RX 258: Performed by: ORTHOPAEDIC SURGERY

## 2019-06-11 PROCEDURE — 3600000014 HC SURGERY LEVEL 4 ADDTL 15MIN: Performed by: ORTHOPAEDIC SURGERY

## 2019-06-11 PROCEDURE — 64447 NJX AA&/STRD FEMORAL NRV IMG: CPT | Performed by: ANESTHESIOLOGY

## 2019-06-11 PROCEDURE — 2720000010 HC SURG SUPPLY STERILE: Performed by: ORTHOPAEDIC SURGERY

## 2019-06-11 PROCEDURE — 6360000002 HC RX W HCPCS: Performed by: ORTHOPAEDIC SURGERY

## 2019-06-11 PROCEDURE — 2580000003 HC RX 258: Performed by: NURSE PRACTITIONER

## 2019-06-11 PROCEDURE — 7100000001 HC PACU RECOVERY - ADDTL 15 MIN: Performed by: ORTHOPAEDIC SURGERY

## 2019-06-11 PROCEDURE — 7100000000 HC PACU RECOVERY - FIRST 15 MIN: Performed by: ORTHOPAEDIC SURGERY

## 2019-06-11 PROCEDURE — C1713 ANCHOR/SCREW BN/BN,TIS/BN: HCPCS | Performed by: ORTHOPAEDIC SURGERY

## 2019-06-11 PROCEDURE — 3700000001 HC ADD 15 MINUTES (ANESTHESIA): Performed by: ORTHOPAEDIC SURGERY

## 2019-06-11 PROCEDURE — 6370000000 HC RX 637 (ALT 250 FOR IP): Performed by: ORTHOPAEDIC SURGERY

## 2019-06-11 PROCEDURE — 3700000000 HC ANESTHESIA ATTENDED CARE: Performed by: ORTHOPAEDIC SURGERY

## 2019-06-11 PROCEDURE — 6360000002 HC RX W HCPCS: Performed by: ANESTHESIOLOGY

## 2019-06-11 PROCEDURE — 6360000002 HC RX W HCPCS: Performed by: NURSE ANESTHETIST, CERTIFIED REGISTERED

## 2019-06-11 PROCEDURE — 6370000000 HC RX 637 (ALT 250 FOR IP): Performed by: NURSE PRACTITIONER

## 2019-06-11 PROCEDURE — 94150 VITAL CAPACITY TEST: CPT

## 2019-06-11 PROCEDURE — 2500000003 HC RX 250 WO HCPCS: Performed by: NURSE PRACTITIONER

## 2019-06-11 PROCEDURE — C1776 JOINT DEVICE (IMPLANTABLE): HCPCS | Performed by: ORTHOPAEDIC SURGERY

## 2019-06-11 PROCEDURE — 97162 PT EVAL MOD COMPLEX 30 MIN: CPT

## 2019-06-11 PROCEDURE — 73560 X-RAY EXAM OF KNEE 1 OR 2: CPT

## 2019-06-11 PROCEDURE — 2709999900 HC NON-CHARGEABLE SUPPLY: Performed by: ORTHOPAEDIC SURGERY

## 2019-06-11 PROCEDURE — 97165 OT EVAL LOW COMPLEX 30 MIN: CPT

## 2019-06-11 PROCEDURE — 3600000004 HC SURGERY LEVEL 4 BASE: Performed by: ORTHOPAEDIC SURGERY

## 2019-06-11 PROCEDURE — G0378 HOSPITAL OBSERVATION PER HR: HCPCS

## 2019-06-11 PROCEDURE — 2500000003 HC RX 250 WO HCPCS: Performed by: NURSE ANESTHETIST, CERTIFIED REGISTERED

## 2019-06-11 PROCEDURE — 1210000000 HC MED SURG R&B

## 2019-06-11 DEVICE — PSN TIB STM 5 DEG SZ E R: Type: IMPLANTABLE DEVICE | Site: KNEE | Status: FUNCTIONAL

## 2019-06-11 DEVICE — IMPL KNEE PERSONA POLY PATELLA 26MM: Type: IMPLANTABLE DEVICE | Site: KNEE | Status: FUNCTIONAL

## 2019-06-11 DEVICE — SYSTEM TOT KNEE CEM FEM TIB COMP STD TIB INSRT STD PAT: Type: IMPLANTABLE DEVICE | Site: KNEE | Status: FUNCTIONAL

## 2019-06-11 DEVICE — COMPONENT FEM SZ 10 NAR R KNEE CO CHROM CEM POST STBL MID: Type: IMPLANTABLE DEVICE | Site: KNEE | Status: FUNCTIONAL

## 2019-06-11 DEVICE — IMPL KNEE ARTIC 10MM RT: Type: IMPLANTABLE DEVICE | Site: KNEE | Status: FUNCTIONAL

## 2019-06-11 DEVICE — CEMENT BNE 40GM HI VISC RADPQ FOR REV SURG: Type: IMPLANTABLE DEVICE | Site: KNEE | Status: FUNCTIONAL

## 2019-06-11 RX ORDER — HYDROCODONE BITARTRATE AND ACETAMINOPHEN 5; 325 MG/1; MG/1
1 TABLET ORAL PRN
Status: DISCONTINUED | OUTPATIENT
Start: 2019-06-11 | End: 2019-06-11 | Stop reason: HOSPADM

## 2019-06-11 RX ORDER — DIPHENHYDRAMINE HYDROCHLORIDE 50 MG/ML
12.5 INJECTION INTRAMUSCULAR; INTRAVENOUS
Status: DISCONTINUED | OUTPATIENT
Start: 2019-06-11 | End: 2019-06-11 | Stop reason: HOSPADM

## 2019-06-11 RX ORDER — SENNA AND DOCUSATE SODIUM 50; 8.6 MG/1; MG/1
1 TABLET, FILM COATED ORAL 2 TIMES DAILY
Status: DISCONTINUED | OUTPATIENT
Start: 2019-06-11 | End: 2019-06-12 | Stop reason: HOSPADM

## 2019-06-11 RX ORDER — FENTANYL CITRATE 50 UG/ML
50 INJECTION, SOLUTION INTRAMUSCULAR; INTRAVENOUS EVERY 10 MIN PRN
Status: DISCONTINUED | OUTPATIENT
Start: 2019-06-11 | End: 2019-06-11 | Stop reason: HOSPADM

## 2019-06-11 RX ORDER — ROPIVACAINE HYDROCHLORIDE 5 MG/ML
INJECTION, SOLUTION EPIDURAL; INFILTRATION; PERINEURAL PRN
Status: DISCONTINUED | OUTPATIENT
Start: 2019-06-11 | End: 2019-06-11 | Stop reason: SDUPTHER

## 2019-06-11 RX ORDER — ACETAMINOPHEN 500 MG
1000 TABLET ORAL ONCE
Status: COMPLETED | OUTPATIENT
Start: 2019-06-11 | End: 2019-06-11

## 2019-06-11 RX ORDER — SODIUM CHLORIDE, SODIUM LACTATE, POTASSIUM CHLORIDE, CALCIUM CHLORIDE 600; 310; 30; 20 MG/100ML; MG/100ML; MG/100ML; MG/100ML
INJECTION, SOLUTION INTRAVENOUS CONTINUOUS
Status: DISCONTINUED | OUTPATIENT
Start: 2019-06-11 | End: 2019-06-11

## 2019-06-11 RX ORDER — SODIUM CHLORIDE 0.9 % (FLUSH) 0.9 %
10 SYRINGE (ML) INJECTION EVERY 12 HOURS SCHEDULED
Status: DISCONTINUED | OUTPATIENT
Start: 2019-06-11 | End: 2019-06-11 | Stop reason: HOSPADM

## 2019-06-11 RX ORDER — ASPIRIN 81 MG/1
81 TABLET ORAL 2 TIMES DAILY
Status: DISCONTINUED | OUTPATIENT
Start: 2019-06-12 | End: 2019-06-12 | Stop reason: HOSPADM

## 2019-06-11 RX ORDER — LIDOCAINE HYDROCHLORIDE 10 MG/ML
1 INJECTION, SOLUTION EPIDURAL; INFILTRATION; INTRACAUDAL; PERINEURAL
Status: COMPLETED | OUTPATIENT
Start: 2019-06-11 | End: 2019-06-11

## 2019-06-11 RX ORDER — MORPHINE SULFATE 2 MG/ML
2 INJECTION, SOLUTION INTRAMUSCULAR; INTRAVENOUS
Status: DISCONTINUED | OUTPATIENT
Start: 2019-06-11 | End: 2019-06-12 | Stop reason: HOSPADM

## 2019-06-11 RX ORDER — LIDOCAINE HYDROCHLORIDE 10 MG/ML
1 INJECTION, SOLUTION EPIDURAL; INFILTRATION; INTRACAUDAL; PERINEURAL
Status: DISCONTINUED | OUTPATIENT
Start: 2019-06-11 | End: 2019-06-11 | Stop reason: HOSPADM

## 2019-06-11 RX ORDER — SODIUM CHLORIDE 9 MG/ML
INJECTION, SOLUTION INTRAVENOUS CONTINUOUS
Status: DISCONTINUED | OUTPATIENT
Start: 2019-06-11 | End: 2019-06-12 | Stop reason: HOSPADM

## 2019-06-11 RX ORDER — VITAMIN B COMPLEX
1 CAPSULE ORAL DAILY
Status: DISCONTINUED | OUTPATIENT
Start: 2019-06-11 | End: 2019-06-12 | Stop reason: HOSPADM

## 2019-06-11 RX ORDER — CEFAZOLIN SODIUM 2 G/50ML
2 SOLUTION INTRAVENOUS ONCE
Status: COMPLETED | OUTPATIENT
Start: 2019-06-11 | End: 2019-06-11

## 2019-06-11 RX ORDER — OXYCODONE HYDROCHLORIDE 5 MG/1
5 TABLET ORAL EVERY 4 HOURS PRN
Status: DISCONTINUED | OUTPATIENT
Start: 2019-06-11 | End: 2019-06-12 | Stop reason: HOSPADM

## 2019-06-11 RX ORDER — PHENYLEPHRINE HYDROCHLORIDE 10 MG/ML
INJECTION INTRAVENOUS PRN
Status: DISCONTINUED | OUTPATIENT
Start: 2019-06-11 | End: 2019-06-11 | Stop reason: SDUPTHER

## 2019-06-11 RX ORDER — SODIUM CHLORIDE 0.9 % (FLUSH) 0.9 %
10 SYRINGE (ML) INJECTION PRN
Status: DISCONTINUED | OUTPATIENT
Start: 2019-06-11 | End: 2019-06-12 | Stop reason: HOSPADM

## 2019-06-11 RX ORDER — SODIUM CHLORIDE 0.9 % (FLUSH) 0.9 %
10 SYRINGE (ML) INJECTION PRN
Status: DISCONTINUED | OUTPATIENT
Start: 2019-06-11 | End: 2019-06-11 | Stop reason: HOSPADM

## 2019-06-11 RX ORDER — MAGNESIUM HYDROXIDE 1200 MG/15ML
LIQUID ORAL CONTINUOUS PRN
Status: COMPLETED | OUTPATIENT
Start: 2019-06-11 | End: 2019-06-11

## 2019-06-11 RX ORDER — PROPOFOL 10 MG/ML
INJECTION, EMULSION INTRAVENOUS CONTINUOUS PRN
Status: DISCONTINUED | OUTPATIENT
Start: 2019-06-11 | End: 2019-06-11 | Stop reason: SDUPTHER

## 2019-06-11 RX ORDER — CELECOXIB 200 MG/1
400 CAPSULE ORAL ONCE
Status: COMPLETED | OUTPATIENT
Start: 2019-06-11 | End: 2019-06-11

## 2019-06-11 RX ORDER — SODIUM CHLORIDE 0.9 % (FLUSH) 0.9 %
10 SYRINGE (ML) INJECTION EVERY 12 HOURS SCHEDULED
Status: DISCONTINUED | OUTPATIENT
Start: 2019-06-11 | End: 2019-06-12 | Stop reason: HOSPADM

## 2019-06-11 RX ORDER — FENTANYL CITRATE 50 UG/ML
INJECTION, SOLUTION INTRAMUSCULAR; INTRAVENOUS PRN
Status: DISCONTINUED | OUTPATIENT
Start: 2019-06-11 | End: 2019-06-11 | Stop reason: SDUPTHER

## 2019-06-11 RX ORDER — MORPHINE SULFATE 4 MG/ML
4 INJECTION, SOLUTION INTRAMUSCULAR; INTRAVENOUS
Status: DISCONTINUED | OUTPATIENT
Start: 2019-06-11 | End: 2019-06-12 | Stop reason: HOSPADM

## 2019-06-11 RX ORDER — METOCLOPRAMIDE HYDROCHLORIDE 5 MG/ML
10 INJECTION INTRAMUSCULAR; INTRAVENOUS
Status: DISCONTINUED | OUTPATIENT
Start: 2019-06-11 | End: 2019-06-11 | Stop reason: HOSPADM

## 2019-06-11 RX ORDER — ONDANSETRON 2 MG/ML
4 INJECTION INTRAMUSCULAR; INTRAVENOUS
Status: COMPLETED | OUTPATIENT
Start: 2019-06-11 | End: 2019-06-11

## 2019-06-11 RX ORDER — OXYCODONE HCL 10 MG/1
10 TABLET, FILM COATED, EXTENDED RELEASE ORAL ONCE
Status: COMPLETED | OUTPATIENT
Start: 2019-06-11 | End: 2019-06-11

## 2019-06-11 RX ORDER — SODIUM CHLORIDE, SODIUM LACTATE, POTASSIUM CHLORIDE, CALCIUM CHLORIDE 600; 310; 30; 20 MG/100ML; MG/100ML; MG/100ML; MG/100ML
INJECTION, SOLUTION INTRAVENOUS CONTINUOUS PRN
Status: DISCONTINUED | OUTPATIENT
Start: 2019-06-11 | End: 2019-06-11 | Stop reason: SDUPTHER

## 2019-06-11 RX ORDER — ONDANSETRON 2 MG/ML
4 INJECTION INTRAMUSCULAR; INTRAVENOUS EVERY 6 HOURS PRN
Status: DISCONTINUED | OUTPATIENT
Start: 2019-06-11 | End: 2019-06-12 | Stop reason: HOSPADM

## 2019-06-11 RX ORDER — HYDROCODONE BITARTRATE AND ACETAMINOPHEN 5; 325 MG/1; MG/1
2 TABLET ORAL PRN
Status: DISCONTINUED | OUTPATIENT
Start: 2019-06-11 | End: 2019-06-11 | Stop reason: HOSPADM

## 2019-06-11 RX ORDER — ACETAMINOPHEN 325 MG/1
650 TABLET ORAL EVERY 4 HOURS PRN
Status: DISCONTINUED | OUTPATIENT
Start: 2019-06-11 | End: 2019-06-12 | Stop reason: HOSPADM

## 2019-06-11 RX ORDER — CETIRIZINE HYDROCHLORIDE 10 MG/1
10 TABLET ORAL DAILY
Status: DISCONTINUED | OUTPATIENT
Start: 2019-06-11 | End: 2019-06-12 | Stop reason: HOSPADM

## 2019-06-11 RX ORDER — TRANEXAMIC ACID 100 MG/ML
INJECTION, SOLUTION INTRAVENOUS PRN
Status: DISCONTINUED | OUTPATIENT
Start: 2019-06-11 | End: 2019-06-11 | Stop reason: SDUPTHER

## 2019-06-11 RX ORDER — CHLORAL HYDRATE 500 MG
3000 CAPSULE ORAL DAILY
Status: DISCONTINUED | OUTPATIENT
Start: 2019-06-12 | End: 2019-06-12 | Stop reason: HOSPADM

## 2019-06-11 RX ORDER — MIDAZOLAM HYDROCHLORIDE 1 MG/ML
INJECTION INTRAMUSCULAR; INTRAVENOUS PRN
Status: DISCONTINUED | OUTPATIENT
Start: 2019-06-11 | End: 2019-06-11 | Stop reason: SDUPTHER

## 2019-06-11 RX ORDER — CEFAZOLIN SODIUM 2 G/50ML
2 SOLUTION INTRAVENOUS EVERY 8 HOURS
Status: COMPLETED | OUTPATIENT
Start: 2019-06-11 | End: 2019-06-12

## 2019-06-11 RX ADMIN — SODIUM CHLORIDE, POTASSIUM CHLORIDE, SODIUM LACTATE AND CALCIUM CHLORIDE: 600; 310; 30; 20 INJECTION, SOLUTION INTRAVENOUS at 08:05

## 2019-06-11 RX ADMIN — MIDAZOLAM HYDROCHLORIDE 1 MG: 1 INJECTION, SOLUTION INTRAMUSCULAR; INTRAVENOUS at 09:09

## 2019-06-11 RX ADMIN — ROPIVACAINE HYDROCHLORIDE 30 ML: 5 INJECTION, SOLUTION EPIDURAL; INFILTRATION; PERINEURAL at 08:35

## 2019-06-11 RX ADMIN — CELECOXIB 400 MG: 200 CAPSULE ORAL at 08:06

## 2019-06-11 RX ADMIN — OXYCODONE HYDROCHLORIDE 10 MG: 10 TABLET, FILM COATED, EXTENDED RELEASE ORAL at 08:06

## 2019-06-11 RX ADMIN — PHENYLEPHRINE HYDROCHLORIDE 100 MCG: 10 INJECTION INTRAVENOUS at 09:23

## 2019-06-11 RX ADMIN — PHENYLEPHRINE HYDROCHLORIDE 50 MCG: 10 INJECTION INTRAVENOUS at 09:02

## 2019-06-11 RX ADMIN — PHENYLEPHRINE HYDROCHLORIDE 50 MCG: 10 INJECTION INTRAVENOUS at 09:49

## 2019-06-11 RX ADMIN — MIDAZOLAM HYDROCHLORIDE 2 MG: 1 INJECTION, SOLUTION INTRAMUSCULAR; INTRAVENOUS at 08:30

## 2019-06-11 RX ADMIN — PHENYLEPHRINE HYDROCHLORIDE 50 MCG: 10 INJECTION INTRAVENOUS at 08:57

## 2019-06-11 RX ADMIN — CEFAZOLIN SODIUM 2 G: 2 SOLUTION INTRAVENOUS at 09:22

## 2019-06-11 RX ADMIN — ONDANSETRON 4 MG: 2 INJECTION INTRAMUSCULAR; INTRAVENOUS at 11:20

## 2019-06-11 RX ADMIN — SODIUM CHLORIDE, POTASSIUM CHLORIDE, SODIUM LACTATE AND CALCIUM CHLORIDE: 600; 310; 30; 20 INJECTION, SOLUTION INTRAVENOUS at 09:48

## 2019-06-11 RX ADMIN — TRANEXAMIC ACID 1000 MG: 1 INJECTION, SOLUTION INTRAVENOUS at 11:24

## 2019-06-11 RX ADMIN — LIDOCAINE HYDROCHLORIDE 0.1 ML: 10 INJECTION, SOLUTION EPIDURAL; INFILTRATION; INTRACAUDAL; PERINEURAL at 08:04

## 2019-06-11 RX ADMIN — FENTANYL CITRATE 50 MCG: 50 INJECTION, SOLUTION INTRAMUSCULAR; INTRAVENOUS at 10:58

## 2019-06-11 RX ADMIN — ACETAMINOPHEN 1000 MG: 500 TABLET ORAL at 08:05

## 2019-06-11 RX ADMIN — CEFAZOLIN SODIUM 2 G: 2 SOLUTION INTRAVENOUS at 17:20

## 2019-06-11 RX ADMIN — ONDANSETRON 4 MG: 2 INJECTION INTRAMUSCULAR; INTRAVENOUS at 20:47

## 2019-06-11 RX ADMIN — SODIUM CHLORIDE: 9 INJECTION, SOLUTION INTRAVENOUS at 13:56

## 2019-06-11 RX ADMIN — SODIUM CHLORIDE, POTASSIUM CHLORIDE, SODIUM LACTATE AND CALCIUM CHLORIDE: 600; 310; 30; 20 INJECTION, SOLUTION INTRAVENOUS at 08:02

## 2019-06-11 RX ADMIN — FENTANYL CITRATE 50 MCG: 50 INJECTION, SOLUTION INTRAMUSCULAR; INTRAVENOUS at 11:09

## 2019-06-11 RX ADMIN — MIDAZOLAM HYDROCHLORIDE 1 MG: 1 INJECTION, SOLUTION INTRAMUSCULAR; INTRAVENOUS at 08:55

## 2019-06-11 RX ADMIN — PHENYLEPHRINE HYDROCHLORIDE 100 MCG: 10 INJECTION INTRAVENOUS at 08:53

## 2019-06-11 RX ADMIN — PROPOFOL 75 MCG/KG/MIN: 10 INJECTION, EMULSION INTRAVENOUS at 09:08

## 2019-06-11 RX ADMIN — TRANEXAMIC ACID 1000 MG: 1 INJECTION, SOLUTION INTRAVENOUS at 09:22

## 2019-06-11 RX ADMIN — FENTANYL CITRATE 100 MCG: 50 INJECTION, SOLUTION INTRAMUSCULAR; INTRAVENOUS at 08:30

## 2019-06-11 ASSESSMENT — PULMONARY FUNCTION TESTS
PIF_VALUE: 0
PIF_VALUE: 2
PIF_VALUE: 0
PIF_VALUE: 1
PIF_VALUE: 0

## 2019-06-11 ASSESSMENT — PAIN DESCRIPTION - LOCATION
LOCATION: KNEE
LOCATION: KNEE

## 2019-06-11 ASSESSMENT — PAIN SCALES - GENERAL
PAINLEVEL_OUTOF10: 7
PAINLEVEL_OUTOF10: 1
PAINLEVEL_OUTOF10: 8
PAINLEVEL_OUTOF10: 1
PAINLEVEL_OUTOF10: 2
PAINLEVEL_OUTOF10: 8
PAINLEVEL_OUTOF10: 7
PAINLEVEL_OUTOF10: 7
PAINLEVEL_OUTOF10: 2

## 2019-06-11 ASSESSMENT — PAIN DESCRIPTION - PAIN TYPE
TYPE: ACUTE PAIN

## 2019-06-11 ASSESSMENT — PAIN DESCRIPTION - FREQUENCY: FREQUENCY: INTERMITTENT

## 2019-06-11 ASSESSMENT — PAIN - FUNCTIONAL ASSESSMENT: PAIN_FUNCTIONAL_ASSESSMENT: 0-10

## 2019-06-11 ASSESSMENT — PAIN DESCRIPTION - ORIENTATION
ORIENTATION: RIGHT
ORIENTATION: RIGHT

## 2019-06-11 ASSESSMENT — PAIN DESCRIPTION - DESCRIPTORS
DESCRIPTORS: ACHING
DESCRIPTORS: ACHING

## 2019-06-11 NOTE — PROGRESS NOTES
Pt resting with eyes closed. Not C/O pain but when asked rates a 5. Awaiting bed assignment.   TXA inf without diff

## 2019-06-11 NOTE — ANESTHESIA PRE PROCEDURE
Karsten Kaur, APRN - CNP        ceFAZolin (ANCEF) 2 g in dextrose 3 % 50 mL IVPB (duplex)  2 g Intravenous Once Jennifer Ro MD        fentaNYL (SUBLIMAZE) injection 50 mcg  50 mcg Intravenous Q10 Min PRN Isma Saucedo MD        HYDROmorphone (DILAUDID) injection 0.5 mg  0.5 mg Intravenous Q10 Min PRN Isma Saucedo MD        HYDROcodone-acetaminophen [facility]) 5-325 MG per tablet 1 tablet  1 tablet Oral PRN Isma Saucedo MD        Or    HYDROcodone-acetaminophen [facility]) 5-325 MG per tablet 2 tablet  2 tablet Oral PRN Isma Saucedo MD        diphenhydrAMINE (BENADRYL) injection 12.5 mg  12.5 mg Intravenous Once PRN Isma Saucedo MD        ondansetron [facility]) injection 4 mg  4 mg Intravenous Once PRN Isma Saucedo MD        metoclopramide [facility]) injection 10 mg  10 mg Intravenous Once PRN Isma Saucedo MD        lactated ringers infusion   Intravenous Continuous Isma Saucedo MD        sodium chloride flush 0.9 % injection 10 mL  10 mL Intravenous 2 times per day Isma Saucedo MD        sodium chloride flush 0.9 % injection 10 mL  10 mL Intravenous PRN Isma Saucedo MD        lidocaine PF 1 % injection 1 mL  1 mL Intradermal Once PRN Isma Saucedo MD           Allergies: Allergies   Allergen Reactions    Cortisone Anxiety, Other (See Comments), Palpitations and Shortness Of Breath    Meperidine Other (See Comments)     dizziness    Metformin And Related      achy    Shellfish Allergy Diarrhea and Nausea And Vomiting       Problem List:    Patient Active Problem List   Diagnosis Code    Mixed hyperlipidemia E78.2    Colon polyps K63.5    Prediabetes R73.03       Past Medical History:        Diagnosis Date    Colon polyps     Prediabetes        Past Surgical History:        Procedure Laterality Date    COLONOSCOPY  8/13/13    DR. CHEN, polyps needs 2016    COLONOSCOPY  01/23/2017      COLPOSCOPY  2010    ENDOMETRIAL ABLATION         Social History:    Social History     Tobacco Use    Smoking status: Never Smoker    Smokeless tobacco: Never Used   Substance Use Topics    Alcohol use: Yes     Comment: rare                                Counseling given: Not Answered      Vital Signs (Current):   Vitals:    06/11/19 0722   BP: 133/68   Pulse: 81   Resp: 16   Temp: 97.4 °F (36.3 °C)   TempSrc: Temporal   SpO2: 96%   Weight: 200 lb (90.7 kg)   Height: 5' 9\" (1.753 m)                                              BP Readings from Last 3 Encounters:   06/11/19 133/68   06/06/19 133/64   04/04/19 125/85       NPO Status: Time of last liquid consumption: 2100                        Time of last solid consumption: 2230                        Date of last liquid consumption: 06/10/19                        Date of last solid food consumption: 06/10/19    BMI:   Wt Readings from Last 3 Encounters:   06/11/19 200 lb (90.7 kg)   06/06/19 200 lb (90.7 kg)   04/04/19 216 lb (98 kg)     Body mass index is 29.53 kg/m². CBC:   Lab Results   Component Value Date    WBC 5.7 06/26/2018    RBC 4.49 06/26/2018    HGB 13.4 06/26/2018    HCT 41.1 06/26/2018    MCV 91.6 06/26/2018    RDW 14.2 06/26/2018     06/26/2018       CMP:   Lab Results   Component Value Date     12/06/2018    K 4.6 12/06/2018     12/06/2018    CO2 25 12/06/2018    BUN 19 12/06/2018    CREATININE 0.63 12/06/2018    GFRAA >60.0 12/06/2018    LABGLOM >60.0 12/06/2018    GLUCOSE 87 12/06/2018    PROT 7.4 12/06/2018    CALCIUM 9.9 12/06/2018    BILITOT 0.4 12/06/2018    ALKPHOS 63 12/06/2018    AST 22 12/06/2018    ALT 12 12/06/2018       POC Tests: No results for input(s): POCGLU, POCNA, POCK, POCCL, POCBUN, POCHEMO, POCHCT in the last 72 hours.     Coags: No results found for: PROTIME, INR, APTT    HCG (If Applicable): No results found for: PREGTESTUR, PREGSERUM, HCG, HCGQUANT     ABGs: No results found for: PHART, PO2ART, CJG7VMK, ILG7SKL, BEART, Q5VCGPZL     Type & Screen (If Applicable):  No results found for: LABABO, [address]    Anesthesia Evaluation  Patient summary reviewed and Nursing notes reviewed no history of anesthetic complications:   Airway: Mallampati: II  TM distance: >3 FB   Neck ROM: full  Mouth opening: > = 3 FB Dental: normal exam         Pulmonary:Negative Pulmonary ROS and normal exam                               Cardiovascular:Negative CV ROS          ECG reviewed                        Neuro/Psych:   Negative Neuro/Psych ROS              GI/Hepatic/Renal: Neg GI/Hepatic/Renal ROS            Endo/Other: Negative Endo/Other ROS             Pt had PAT visit. Abdominal:           Vascular: negative vascular ROS. Anesthesia Plan      spinal     ASA 2       Induction: intravenous. MIPS: Prophylactic antiemetics administered. Anesthetic plan and risks discussed with patient. Plan discussed with CRNA.     Attending anesthesiologist reviewed and agrees with Pre Eval content              Zenia Bamberger, MD   6/11/2019

## 2019-06-11 NOTE — ANESTHESIA POSTPROCEDURE EVALUATION
Department of Anesthesiology  Postprocedure Note    Patient: Martin Arrington  MRN: [id number]  YOB: 1960  Date of evaluation: 6/11/2019  Time:  10:48 AM     Procedure Summary     Date:  06/11/19 [facility] / [facility]:  [facility]    Anesthesia Start:  0902 Anesthesia Stop:      Procedure:  RIGHT TOTAL KNEE ARTHROPLASTY (Right Knee) Diagnosis:  (RIGHT KNEE DEGENERATIVE JOINT DISEASE)    Surgeon:  Nova Nj MD Responsible Provider:  Nenita Long MD    Anesthesia Type:  spinal ASA Status:  2          Anesthesia Type: spinal    Rip Phase I:      Rip Phase II:      Last vitals: Reviewed and per EMR flowsheets.        Anesthesia Post Evaluation    Patient location during evaluation: PACU  Patient participation: complete - patient participated  Level of consciousness: awake and alert  Pain score: 8  Airway patency: patent  Nausea & Vomiting: no nausea and no vomiting  Complications: no  Cardiovascular status: blood pressure returned to baseline and hemodynamically stable  Respiratory status: acceptable and face mask  Hydration status: euvolemic

## 2019-06-11 NOTE — ANESTHESIA PROCEDURE NOTES
Peripheral Block    Patient location during procedure: pre-op  Staffing  Anesthesiologist: Alvarado Reyes MD  Performed: anesthesiologist   Preanesthetic Checklist  Completed: patient identified, site marked, surgical consent, pre-op evaluation, timeout performed, IV checked, risks and benefits discussed, monitors and equipment checked, anesthesia consent given, oxygen available and patient being monitored  Peripheral Block  Patient position: supine  Prep: ChloraPrep  Patient monitoring: cardiac monitor, continuous pulse ox, frequent blood pressure checks and IV access  Block type: Saphenous  Laterality: right  Injection technique: single-shot  Procedures: ultrasound guided  Local infiltration: ropivacaine  Infiltration strength: 0.5 %  Dose: 30 mL  Provider prep: mask and sterile gloves  Local infiltration: ropivacaine  Needle  Needle type: combined needle/nerve stimulator   Needle gauge: 21 G  Needle length: 10 cm  Needle localization: ultrasound guidance  Test dose: negative  Assessment  Injection assessment: negative aspiration for heme, no paresthesia on injection and local visualized surrounding nerve on ultrasound  Paresthesia pain: none  Slow fractionated injection: yes  Hemodynamics: stable  Reason for block: post-op pain management

## 2019-06-11 NOTE — PROGRESS NOTES
Pt arrived to PACU restless stating pain in knee cap area.   Very drowsy yet but call to Dr. Tracy Hubbard and order to start giving Fentanyl received

## 2019-06-11 NOTE — ANESTHESIA PROCEDURE NOTES
Spinal Block    Patient location during procedure: pre-op  Reason for block: primary anesthetic  Staffing  Anesthesiologist: Emmanuelle Bermeo MD  Performed: anesthesiologist   Preanesthetic Checklist  Completed: patient identified, site marked, surgical consent, pre-op evaluation, timeout performed, IV checked, risks and benefits discussed, monitors and equipment checked, anesthesia consent given, oxygen available and patient being monitored  Spinal Block  Patient position: sitting  Prep: Betadine  Patient monitoring: cardiac monitor, continuous pulse ox and frequent blood pressure checks  Approach: midline  Location: L4/L5  Provider prep: mask and sterile gloves  Local infiltration: lidocaine  Agent: bupivacaine  Dose: 2  Dose: 2  Needle  Needle type: Pencan   Needle gauge: 24 G  Assessment  Events: cerebrospinal fluid  Swirl obtained: Yes  CSF: clear  Attempts: 1  Hemodynamics: stable

## 2019-06-11 NOTE — PROGRESS NOTES
Physical Therapy Med Surg Initial Assessment  Fac[facility]/Department[facility]  [facility]       NAME: Simran Bartlett  : 1960 (6 y.o.)  MRN: [id number]  CODE STATUS: Full Code    Date of Service: 2019    Patient Diagnosis(es): H/O total knee replacement, right [Z96.651]   No chief complaint on file. Patient Active Problem List    Diagnosis Date Noted    H/O total knee replacement, right 2019    Prediabetes 2017    Mixed hyperlipidemia 2017    Colon polyps         Past Medical History:   Diagnosis Date    Colon polyps     Prediabetes      Past Surgical History:   Procedure Laterality Date    COLONOSCOPY  13    DR. CHEN, polyps needs     COLONOSCOPY  2017    DR. CHEN     COLPOSCOPY      ENDOMETRIAL ABLATION         Chart Reviewed: Yes  Patient assessed for rehabilitation services?: Yes  Family / Caregiver Present: No  General Comment  Comments: Pt awake in bed, agreeable to PT Eval    Restrictions:  Restrictions/Precautions: Weight Bearing, Fall Risk  Lower Extremity Weight Bearing Restrictions  Right Lower Extremity Weight Bearing: Weight Bearing As Tolerated  Position Activity Restriction  Other position/activity restrictions: knee immobilizer until (+) SLR     SUBJECTIVE: Subjective: \"I'm really nauseous but I don't want to use the bed pan\"  Pre Treatment Pain Screening  Pain at present: 2(at rest)  Intervention List: Patient able to continue with treatment;Nurse/physician notified    Post Treatment Pain Screening:   Pain Screening  Patient Currently in Pain: Yes  Pain Assessment  Pain Assessment: 0-10  Pain Level: 7  Pain Type: Acute pain    Prior Level of Function:  Social/Functional History  Lives With: Alone  Type of Home: House  Home Layout: One level  Home Access: Stairs to enter with rails  Entrance Stairs - Number of Steps: 3  Entrance Stairs - Rails: Left  Bathroom Shower/Tub: Walk-in shower  Bathroom Equipment: Shower chair  Home Equipment: Rolling walker, [employer] Help From: Family  ADL Assistance: Independent  Homemaking Assistance: Independent  Homemaking Responsibilities: Yes  Ambulation Assistance: Independent(was using walker or cane on and off due to knee pain)  Transfer Assistance: Independent  Active : Yes  Mode of Transportation: Car  Additional Comments: dtr will be staying about 1-2 weeks at [place]    OBJECTIVE:   Vision/Hearing:  Vision: Impaired  Vision Exceptions: Wears glasses at all times  Hearing: Within functional limits    Cognition:  Follows Commands: Within Functional Limits         ROM:  RLE PROM: WFL  RLE General PROM: knee NT  LLE PROM: WFL    Strength:  Strength RLE  Comment: observed >/= 3+/5  Strength LLE  Strength LLE: WFL    Neuro:  Balance  Sitting - Static: Good  Sitting - Dynamic: Good  Standing - Static: Fair  Standing - Dynamic: Fair;-                  Transfers  Sit to Stand: Stand by assistance(increased time to stand from bed and BSC but overall good safety awareness)  Stand to sit: Stand by assistance  Bed to Chair: Stand by assistance(VCs for use of [employer], safety awareness, hand placement)    Ambulation  Ambulation?: Yes  Ambulation 1  Surface: level tile  Device: Rolling Walker  Other Apparatus: Knee Immobilizer  Assistance: Stand by assistance  Quality of Gait: antalgic gait, step-to pattern  Distance: 5 ft, 3 ft    Activity Tolerance  Activity Tolerance: Patient Tolerated treatment well  Activity Tolerance: intermittent rest breaks due to nausea, does not vomit, requests to try sitting up in chair    Exercises  Quad Sets: x10  Gluteal Sets: x10  Ankle Pumps: x10  Comments: Pt instructed in anti-embolics while up in chair including ankle pumps, quad sets, and glute sets; educated with frequency and dosage of HEP and PT POC while in-house. ASSESSMENT:   Body structures, Functions, Activity limitations: Decreased functional mobility ; Decreased endurance;Decreased balance;Decreased strength; Increased Pain;Decreased ROM  Decision Making: Medium Complexity  History: medium  Exam: high  Clinical Presentation: medium    Prognosis: Good  Patient Education: PT POC, HEP, safety awareness, falls precautions    DISCHARGE RECOMMENDATIONS:  Discharge Recommendations: Continue to assess pending progress, Patient would benefit from continued therapy after discharge    Assessment: Pt demonstrates the above deficits and decline in functional mobility s/p L TKA placing them at increased risk for falls. Pt would benefit from physical therapy to address above deficits and allow for safe return home at highest level of function, decrease risk for falls, and improve QOL.     REQUIRES PT FOLLOW UP: Yes      PLAN OF CARE:  Plan  Times per week: 5-7  Times per day: Twice a day  Current Treatment Recommendations: Strengthening, Balance Training, Transfer Training, Endurance Training, Gait Training, Home Exercise Program, Patient/Caregiver Education & Training, Modalities, ROM, Stair training, Functional Mobility Training, Pain Management, Safety Education & Training, Equipment Evaluation, Education, & procurement  Safety Devices  Type of devices: Call light within reach, Chair alarm in place, Nurse notified    Goals:  Patient goals : to go home  Long term goals  Long term goal 1: indep with bed mobility  Long term goal 2: pt to transfer with supervision assist  Long term goal 3: pt to ambulate >150 ft supervision FWW  Long term goal 4: pt to navigate 3 steps with supervision    [facility] (6 CLICK) Sandi Medina 28 Inpatient Mobility Raw Score : 20     Therapy Time:   Individual   Time In 1535   Time Out 1558   Minutes 23       Bed mob/transfers: 10 min    Garrison Vasques PT, 06/11/19 at 4:50 PM

## 2019-06-11 NOTE — OP NOTE
[address]                      [facility], [address] [facility]                                OPERATIVE REPORT    PATIENT NAME: Naila Pina                     :        1960  MED REC NO:   [id number]                            ROOM:  ACCOUNT NO:   [de-identified]                           ADMIT DATE: 2019  PROVIDER:     Jessica Patel MD    DATE OF PROCEDURE:  2019    LOCATION:  [facility]. PREOPERATIVE DIAGNOSIS:  Right knee DJD. POSTOPERATIVE DIAGNOSIS:  Right knee DJD. PROCEDURES:  1. Right total knee arthroplasty using Helder size E cemented Persona  tibial tray, size 10 narrow cemented Persona posterior stabilized  femoral component with 10-mm Persona posterior stabilized polyethylene  spacer, and 26-mm Persona all-poly patellar insert button. 2.  Utilization of Helder PSI computer alignment system. SURGEON:  Jessica Patel MD.    ASSISTANT:  Lieutenant Jennifer PA-C was present throughout the entire case. Given the nature of the disease process and the procedure, a skilled  surgical first assistant was necessary during the case. The assistant  was necessary to hold retractors and manipulate the extremity during the  procedure. A certified scrub tech was at the back table managing the  instruments and supplies for the surgical case. ANESTHETIC:  Spinal plus block. EBL:  None    COMPLICATIONS:  None. INDICATIONS:  The patient is a 56-year-old female with history of right  knee arthritis. She has failed multiple conservative measures and  elected to proceed with total knee arthroplasty. The risks and benefits  of knee replacement were noted with the patient and family. These  include infection, stiffness, nerve damage, continued pain as well as  need for subsequent operations. Informed consent was obtained prior to  arrival in the operating room.     CLINICAL NOTE:  The patient has progressive knee pain Once  the femoral cuts were complete, a posterior retractor was inserted. The  medial and lateral tibial retractors were inserted and the meniscal  remnants were excised. The Helder custom PSI cutting pin guide for the  tibia was pinned in position. The guide was removed and the tibial  resection was made after checking the extramedullary alignment with the  extramedullary alignment device. Once the tibial resection was complete, the tibial components were  inserted. Patellar reaming was performed. Peg holes were drilled in  the patella and a trial patella was applied. The knee was placed  through a range of motion and once appropriate stability was obtained,  trial components were removed. All bony surfaces were irrigated with copious amounts of saline  irrigation. The components were inserted as indicated. A deep drain  placed in the knee joint. The capsule was closed using interrupted #1,  the subcutaneous tissues were closed using #3-0 Monocryl and staples  were then used for the skin. Dry sterile bulky dressing applied. The  patient tolerated the procedure well, taken to the PACU good condition  without complication.         Veronica Mireles MD    D: 06/11/2019 10:28:49       T: 06/11/2019 10:53:18     [email or web address]  Job#: [id number]     Doc#: [id number]    CC:

## 2019-06-11 NOTE — CARE COORDINATION
PATIENT IS FROM HOME ALONE. HER DTR AND HER CHILD ARE STAYING WITH HER AND PLAN TO HELP AT DISCHARGE. PATIENT WAS [facility] PREHAB PATIENT, AND AFTER FREEDOM OF CHOICE WANTS TO RETURN HOME WITH [facility] AT [facility]. PATIENT STATES SHE HAS ALL THE DME SHE WOULD NEED. LSW/C3 TO FOLLOW AFTER PT/OT EVALS TO ENSURE NO OTHER D/C NEEDS OR CHANGES NEEDED TO THE D/C PLAN.

## 2019-06-11 NOTE — CONSULTS
Consult Note    Reason for Consult: Medical management     Requesting Physician:  Conchis Phillips MD    HISTORY OF PRESENT ILLNESS:    The patient is a 62 y.o. female who admitted following right total knee arthroplasty d/t right knee DJD . Pt states she has been having pain in right knee for the last 2.5 years, has had cortisone injection and taken aleve with no relieve of symptoms and more recently having difficulty ambulating and decided to go for surgery. Pt states surgery went well, states she had an episode of emesis when she got up to the floor but feels better now and tolerating oral liquid. Pt states she is prediabetic and states BG hen  been diet controlled, states last 3 HGA1c has been in the 5's was on metformin in the past but was dcd d/t reaction to med and since BG was controlled. She denies any SOB  or CP. Past Medical History:   Diagnosis Date    Colon polyps     Prediabetes        Past Surgical History:   Procedure Laterality Date    COLONOSCOPY  8/13/13    DR. CHEN, polyps needs 2016    COLONOSCOPY  01/23/2017    DR. CHEN     COLPOSCOPY  2010    ENDOMETRIAL ABLATION         Prior to Admission medications    Medication Sig Start Date End Date Taking?  Authorizing Provider   acetaminophen (TYLENOL) 500 MG tablet Take 500 mg by mouth every 6 hours as needed for Pain    Historical Provider, MD   Omega-3 Fatty Acids (FISH OIL) 1000 MG CAPS Take 3,000 mg by mouth 3 times daily    Historical Provider, MD   loratadine (CLEAR-ATADINE) 10 MG tablet Take 10 mg by mouth daily    Historical Provider, MD   calcium-vitamin D (CALCIUM 500/D) 500-200 MG-UNIT per tablet Take 1 tablet by mouth daily    Historical Provider, MD   b complex vitamins capsule Take 1 capsule by mouth daily    Historical Provider, MD   naproxen-esomeprazole (VIMOVO) 500-20 MG TBEC Take 1 tablet by mouth 2 times daily as needed (pain) 12/10/18   Luwanna Eisenmenger, MD       Scheduled Meds:   sodium chloride flush  10 mL Intravenous 2 times per day    ceFAZolin (ANCEF) IVPB  2 g Intravenous Q8H    sennosides-docusate sodium  1 tablet Oral BID    [START ON 6/12/2019] aspirin  81 mg Oral BID     Continuous Infusions:   sodium chloride 50 mL/hr at 06/11/19 1356     PRN Meds:sodium chloride flush, acetaminophen, oxyCODONE, morphine **OR** morphine, ondansetron    Allergies   Allergen Reactions    Cortisone Anxiety, Other (See Comments), Palpitations and Shortness Of Breath    Meperidine Other (See Comments)     dizziness    Metformin And Related      achy    Shellfish Allergy Diarrhea and Nausea And Vomiting       Social History     Socioeconomic History    Marital status: Legally      Spouse name: Not on file    Number of children: Not on file    Years of education: Not on file    Highest education level: Not on file   Occupational History    Not on file   Social Needs    Financial resource strain: Not on file    Food insecurity:     Worry: Not on file     Inability: Not on file    Transportation needs:     Medical: Not on file     Non-medical: Not on file   Tobacco Use    Smoking status: Never Smoker    Smokeless tobacco: Never Used   Substance and Sexual Activity    Alcohol use: Yes     Comment: rare    Drug use: Not Currently    Sexual activity: Yes   Lifestyle    Physical activity:     Days per week: Not on file     Minutes per session: Not on file    Stress: Not on file   Relationships    Social connections:     Talks on phone: Not on file     Gets together: Not on file     Attends Judaism service: Not on file     Active member of club or organization: Not on file     Attends meetings of clubs or organizations: Not on file     Relationship status: Not on file    Intimate partner violence:     Fear of current or ex partner: Not on file     Emotionally abused: Not on file     Physically abused: Not on file     Forced sexual activity: Not on file   Other Topics Concern    Not on file   Social History Narrative    Not on file       No family history on file. Review Of Systems:   Constitutional: No fever, chills, weakness, otherwise negative  Eyes: No eye pain or redness, otherwise negative  Ears, nose, mouth, throat, and face: No ear ache, no nose bleed no sore throat otherwise negative  Respiratory: No cough, sob, hemoptysis, otherwise  negative  Cardiovascular: No chest pain, palpitation, otherwise negative  Gastrointestinal: No nausea, vomiting diarrhea otherwise negative  Genitourinary:No hematuria, no dysuria, no frequency otherwise negative  Integument/breast: No pain, discomfort, redness otherwise negative  Hematologic/lymphatic: No bleed, lymph node swelling, petechia otherwise negative  Musculoskeletal:right knee pain   Neurological: No headache, seizure, loss of consciousness otherwise negative  Behavioral/Psych: No depression, suicidal or homicidal ideation otherwise negative  Endocrine: No thyroid pain, warmth or tenderness. No wt loss otherwise negative  Allergic/Immunologic: No sneezing, itching or rash otherwise negative    Physical Exam:  Vitals:    06/11/19 1300 06/11/19 1343 06/11/19 1416 06/11/19 1546   BP:  117/64 115/61 (!) 109/49   Pulse: 78 81 66 71   Resp: 9 16     Temp:  97.2 °F (36.2 °C)     TempSrc:  Oral     SpO2: 98% 97%     Weight:       Height:         General appearance: No apparent distress, appears stated age and cooperative. HEENT: Pupils equal, round, and reactive to light. Conjunctivae/corneas clear. Neck: Supple, with full range of motion. No jugular venous distention. Trachea midline. Respiratory:  Normal respiratory effort. Clear to auscultation, bilaterally without Rales/Wheezes/Rhonchi. Cardiovascular: Regular rate and rhythm with normal S1/S2 without murmurs, rubs or gallops. Abdomen: Soft, non-tender, non-distended with normal bowel sounds. Musculoskeletal: No clubbing, cyanosis or edema bilaterally.   Immobilizer with ace wrap in RLE   Skin: Skin color, texture, turgor normal.  No rashes or lesions. Neuro: Non Focal. Symetrical motor and tone. Nl Comprehension, Alert,awake and oriented. NL CN. Symetrical tone and reflexes. Psychiatric: Alert and oriented, thought content appropriate, normal insight  Capillary Refill: Brisk,< 3 seconds   Peripheral Pulses: +2 palpable, equal bilaterally     Labs:  Recent Results (from the past 24 hour(s))   POCT Glucose    Collection Time: 06/11/19  8:02 AM   Result Value Ref Range    POC Glucose 93 60 - 115 mg/dl    Performed on ACCU-CHEK      Assessment/Plan:  1. Post op care s/p Right knee arthroplasty: pain control, PT/OT, f/u with orthopedic surgery, monitor CBC and BMP for any pot op anemia or renal insufficiency. Additional work up or/and treatment plan may be added today or then after based on clinical progression. I am managing a portion of pt care. Some medical issues are handled by other specialists. Additional work up and treatment should be done in out pt setting by pt PCP and other out pt providers. In addition to examining and evaluating pt, I spent additional time explaining care, normal and abnormal findings, and treatment plan. All of pt questions were answered. Counseling, diet and education were  provided. Case will be discussed with nursing staff when appropriate. Family will be updated if and when appropriate. Diet: DIET GENERAL;    Code Status: Full Code  DVT Prophylaxis:aspirin and Anuj hose per orthopedic surgery     Electronically signed by HAJA Nguyen CNP on 6/11/2019 at 4:02 PM     Attending Supervising Physician's [address] Statement  I performed a history and physical examination on the patient and discussed the management with the nurse practitioner. I reviewed and agree with the findings and plan as documented in her note . For post op s/p R TKA, pt/ot, am labs, dvt prophylaxis, no additional medical issues to be addressed.     Electronically signed by Tommy Phillips Benny Vicente MD on 7/17/19 at 10:53 PM

## 2019-06-11 NOTE — PROGRESS NOTES
[facility] OCCUPATIONAL THERAPY EVALUATION - ACUTE     Date: 2019  Patient Name: Alicia Guillen        MRN: [id number]  Account: [de-identified]   : 1960  (62 y.o.)  Room: E954E051-86    Chart Review:  Diagnosis:  There were no encounter diagnoses. Past Medical History:   Diagnosis Date    Colon polyps     Prediabetes      Past Surgical History:   Procedure Laterality Date    COLONOSCOPY  13    DR. CHEN, polyps needs 2016    COLONOSCOPY  2017    DR. CHEN     COLPOSCOPY  2010    ENDOMETRIAL ABLATION         Restrictions  Restrictions/Precautions: Weight Bearing, Fall Risk  Lower Extremity Weight Bearing Restrictions  Right Lower Extremity Weight Bearing: Weight Bearing As Tolerated     Safety Devices: Safety Devices  Safety Devices in place: Yes  Type of devices:  All fall risk precautions in place    Subjective       Pain Reassessment:   Pain Assessment  Pain Assessment: 0-10  Pain Level: 7  Pain Type: Acute pain  Pain Location: Knee  Pain Orientation: Right       Orientation  Orientation  Overall Orientation Status: Within Functional Limits    Prior Level of Function:  Social/Functional History  Lives With: Alone  Type of Home: House  Home Layout: One level  Home Access: Stairs to enter with rails  Entrance Stairs - Number of Steps: 3  Entrance Stairs - Rails: Left  Bathroom Shower/Tub: Walk-in shower  Bathroom Equipment: Shower chair  Home Equipment: Rolling walker, Cane  Receives Help From: Family  ADL Assistance: Independent  Homemaking Assistance: Independent  Homemaking Responsibilities: Yes  Ambulation Assistance: Independent  Transfer Assistance: Independent  Active : Yes  Mode of Transportation: Car    OBJECTIVE:     Orientation Status:  Orientation  Overall Orientation Status: Within Functional Limits    Observation:  Observation/Palpation  Observation: nauseated alert, cooperative    Cognition Status:  Cognition  Overall Cognitive Status: [facility]    Perception Recommendations: Continue to assess pending progress  History: Min comorb  Exam: 5 deficits  Assistance / Modification: max A    Six Click Score   How much help for putting on and taking off regular lower body clothing?: A Lot  How much help for Bathing?: A Lot  How much help for Toileting?: A Little  How much help for putting on and taking off regular upper body clothing?: A Little  How much help for taking care of personal grooming?: A Little                Plan:  Plan  Times per week: 1-3x  Plan weeks: acute LOS  Current Treatment Recommendations: Strengthening, Balance Training, Endurance Training, Safety Education & Training, Equipment Evaluation, Education, & procurement, Self-Care / ADL    Goals:   Patient will:    - Improve functional endurance to tolerate/complete 30 mins of ADL's  - Be Ind in UB ADLs   - Be Mod I in LB ADLs  - Be Mod I in ADL transfers without LOB  - Be Ind in toileting tasks  - Access appropriate D/C site with as few architectural barriers as possible.     Patient Goal:    D/C home with dtr assist   Discussed and agreed upon: Yes Comments:     Therapy Time:   OT Individual Minutes  Time In: 9742  Time Out: 99 Maria T Drew  Minutes: 15    Electronically signed by:    TEGAN Ewing  6/11/2019, 4:30 PM

## 2019-06-11 NOTE — FLOWSHEET NOTE
Patient on telemetry. Heart rate currently resting in the 120s. Perfectserve sent to Dr. Flaco Riley. Awaiting response at this time.  Electronically signed by Miguel Angel Cheung RN on 6/11/19 at 6:49 PM

## 2019-06-11 NOTE — PROGRESS NOTES
830-Taken over to the block area and placed on nasal cannula at  4 liters and pulse ox and was premedicated per Dr. Nell Garcia and a time out was called.

## 2019-06-12 VITALS
TEMPERATURE: 98.4 F | HEART RATE: 76 BPM | BODY MASS INDEX: 29.62 KG/M2 | DIASTOLIC BLOOD PRESSURE: 65 MMHG | OXYGEN SATURATION: 93 % | SYSTOLIC BLOOD PRESSURE: 125 MMHG | HEIGHT: 69 IN | WEIGHT: 200 LBS | RESPIRATION RATE: 16 BRPM

## 2019-06-12 LAB
ANION GAP SERPL CALCULATED.3IONS-SCNC: 12 MEQ/L (ref 9–15)
BUN BLDV-MCNC: 17 MG/DL (ref 8–23)
CALCIUM SERPL-MCNC: 9.4 MG/DL (ref 8.5–9.9)
CHLORIDE BLD-SCNC: 108 MEQ/L (ref 95–107)
CO2: 24 MEQ/L (ref 20–31)
CREAT SERPL-MCNC: 0.73 MG/DL (ref 0.5–0.9)
GFR AFRICAN AMERICAN: >60
GFR NON-AFRICAN AMERICAN: >60
GLUCOSE BLD-MCNC: 107 MG/DL (ref 70–99)
HCT VFR BLD CALC: 33 % (ref 37–47)
HEMOGLOBIN: 11.2 G/DL (ref 12–16)
MCH RBC QN AUTO: 30.8 PG (ref 27–31.3)
MCHC RBC AUTO-ENTMCNC: 34 % (ref 33–37)
MCV RBC AUTO: 90.6 FL (ref 82–100)
PDW BLD-RTO: 14.3 % (ref 11.5–14.5)
PLATELET # BLD: 238 K/UL (ref 130–400)
POTASSIUM REFLEX MAGNESIUM: 4.3 MEQ/L (ref 3.4–4.9)
RBC # BLD: 3.64 M/UL (ref 4.2–5.4)
SODIUM BLD-SCNC: 144 MEQ/L (ref 135–144)
WBC # BLD: 10.6 K/UL (ref 4.8–10.8)

## 2019-06-12 PROCEDURE — 6370000000 HC RX 637 (ALT 250 FOR IP): Performed by: ORTHOPAEDIC SURGERY

## 2019-06-12 PROCEDURE — 80048 BASIC METABOLIC PNL TOTAL CA: CPT

## 2019-06-12 PROCEDURE — 97116 GAIT TRAINING THERAPY: CPT

## 2019-06-12 PROCEDURE — 6360000002 HC RX W HCPCS: Performed by: ORTHOPAEDIC SURGERY

## 2019-06-12 PROCEDURE — 6370000000 HC RX 637 (ALT 250 FOR IP): Performed by: NURSE PRACTITIONER

## 2019-06-12 PROCEDURE — 85027 COMPLETE CBC AUTOMATED: CPT

## 2019-06-12 PROCEDURE — 97535 SELF CARE MNGMENT TRAINING: CPT

## 2019-06-12 PROCEDURE — 36415 COLL VENOUS BLD VENIPUNCTURE: CPT

## 2019-06-12 PROCEDURE — 2580000003 HC RX 258: Performed by: ORTHOPAEDIC SURGERY

## 2019-06-12 PROCEDURE — G0378 HOSPITAL OBSERVATION PER HR: HCPCS

## 2019-06-12 RX ORDER — OXYCODONE HYDROCHLORIDE AND ACETAMINOPHEN 5; 325 MG/1; MG/1
1-2 TABLET ORAL EVERY 6 HOURS PRN
Qty: 12 TABLET | Refills: 0
Start: 2019-06-12 | End: 2019-07-22

## 2019-06-12 RX ORDER — ASPIRIN 81 MG/1
81 TABLET ORAL 2 TIMES DAILY
Qty: 60 TABLET | Refills: 0 | Status: SHIPPED | OUTPATIENT
Start: 2019-06-12

## 2019-06-12 RX ADMIN — CEFAZOLIN SODIUM 2 G: 2 SOLUTION INTRAVENOUS at 01:21

## 2019-06-12 RX ADMIN — CETIRIZINE HYDROCHLORIDE 10 MG: 10 TABLET, FILM COATED ORAL at 08:14

## 2019-06-12 RX ADMIN — OXYCODONE HYDROCHLORIDE 5 MG: 5 TABLET ORAL at 14:13

## 2019-06-12 RX ADMIN — OXYCODONE HYDROCHLORIDE 5 MG: 5 TABLET ORAL at 05:42

## 2019-06-12 RX ADMIN — Medication 1 CAPSULE: at 13:09

## 2019-06-12 RX ADMIN — ASPIRIN 81 MG: 81 TABLET, COATED ORAL at 08:14

## 2019-06-12 RX ADMIN — OYSTER SHELL CALCIUM WITH VITAMIN D 1 TABLET: 500; 200 TABLET, FILM COATED ORAL at 13:09

## 2019-06-12 RX ADMIN — OXYCODONE HYDROCHLORIDE 5 MG: 5 TABLET ORAL at 09:46

## 2019-06-12 RX ADMIN — Medication 10 ML: at 08:15

## 2019-06-12 RX ADMIN — Medication 3000 MG: at 13:09

## 2019-06-12 RX ADMIN — SENNOSIDES AND DOCUSATE SODIUM 1 TABLET: 8.6; 5 TABLET ORAL at 08:14

## 2019-06-12 RX ADMIN — ONDANSETRON 4 MG: 2 INJECTION INTRAMUSCULAR; INTRAVENOUS at 08:14

## 2019-06-12 ASSESSMENT — PAIN DESCRIPTION - ORIENTATION: ORIENTATION: RIGHT

## 2019-06-12 ASSESSMENT — PAIN SCALES - GENERAL
PAINLEVEL_OUTOF10: 7
PAINLEVEL_OUTOF10: 5
PAINLEVEL_OUTOF10: 5
PAINLEVEL_OUTOF10: 7

## 2019-06-12 ASSESSMENT — PAIN DESCRIPTION - LOCATION: LOCATION: KNEE

## 2019-06-12 ASSESSMENT — PAIN DESCRIPTION - PAIN TYPE: TYPE: ACUTE PAIN

## 2019-06-12 NOTE — PROGRESS NOTES
Physical Therapy Med Surg Daily Treatment Note  Fac[facility]/Department[facility]  [facility]       NAME: Valeria Mejia  : 1960 (89 y.o.)  MRN: [id number]  CODE STATUS: Full Code    Date of Service: 2019    Patient Diagnosis(es): H/O total knee replacement, right [Z96.651]   No chief complaint on file. Patient Active Problem List    Diagnosis Date Noted    H/O total knee replacement, right 2019    Prediabetes 2017    Mixed hyperlipidemia 2017    Colon polyps         Past Medical History:   Diagnosis Date    Colon polyps     Prediabetes      Past Surgical History:   Procedure Laterality Date    COLONOSCOPY  13    DR. CHEN, polyps needs 2016    COLONOSCOPY  2017    DR. CHEN     COLPOSCOPY      ENDOMETRIAL ABLATION      TOTAL KNEE ARTHROPLASTY Right 2019    RIGHT TOTAL KNEE ARTHROPLASTY performed by Sarah Pickett MD at [facility]          Restrictions:  Restrictions/Precautions: Weight Bearing, Fall Risk  Lower Extremity Weight Bearing Restrictions  Right Lower Extremity Weight Bearing: Weight Bearing As Tolerated  Position Activity Restriction  Other position/activity restrictions: + SLR dc KI for ambulation. SUBJECTIVE:  General  Chart Reviewed: Yes  Family / Caregiver Present: No  Subjective  Subjective: I'm having pain on my incision. General Comment  Comments: + SLR dc KI for ambulation.      Pre Pain Assessment:  Pre Treatment Pain Screening  Pain at present: 4  Scale Used: Numeric Score  Intervention List: Patient able to continue with treatment  Pain Screening  Patient Currently in Pain: Yes       Post Pain Assessment:   Pain Assessment  Pain Assessment: 0-10  Pain Level: 5  Pain Type: Acute pain  Pain Location: Knee  Pain Orientation: Right       OBJECTIVE:         Bed mobility  Supine to Sit: Independent  Sit to Supine: Independent    Transfers  Sit to Stand: Stand by assistance  Stand to sit: Stand by assistance  Bed to Chair: Stand by assistance  Car Transfer: Stand by assistance  Comment: good safety awareness and technique. Ambulation  Ambulation?: Yes  Ambulation 1  Surface: level tile  Device: Rolling Walker  Assistance: Stand by assistance  Quality of Gait: antalgic gait, step-to pattern  Distance: 30' x2   Comments: distance not the focus of tx. Stairs/Curb  Stairs?: Yes  Stairs  # Steps : 4  Stairs Height: 6\"  Rails: Bilateral  Device: No Device  Assistance: Stand by assistance  Comment: pt with good technique once cued for sequencing. Exercises  Straight Leg Raise: x 5  Quad Sets: x10  Gluteal Sets: x10  Ankle Pumps: x10  Comments: pt given written HEP instruction on technique dosage and frequency given to pt. Pt. verbalizes understanding. ASSESSMENT:  Body structures, Functions, Activity limitations: Decreased functional mobility ; Decreased endurance;Decreased balance;Decreased strength; Increased Pain;Decreased ROM    Assessment: pt with good mobility and safety awareness post op. Dc KI for ambulation.      Activity Tolerance  Activity Tolerance: Patient Tolerated treatment well       Discharge Recommendations:  Continue to assess pending progress, Patient would benefit from continued therapy after discharge    Goals:  Long term goals  Long term goal 1: indep with bed mobility  Long term goal 2: pt to transfer with supervision assist  Long term goal 3: pt to ambulate >150 ft supervision FWW  Long term goal 4: pt to navigate 3 steps with supervision  Patient Goals   Patient goals : to go home    PLAN:   Plan  Times per week: 5-7  Times per day: Twice a day  Current Treatment Recommendations: Strengthening, Balance Training, Transfer Training, Endurance Training, Gait Training, Home Exercise Program, Patient/Caregiver Education & Training, Modalities, ROM, Stair training, Functional Mobility Training, Pain Management, Safety Education & Training, Equipment Evaluation, Education, &

## 2019-06-12 NOTE — PROGRESS NOTES
input(s): Roxana Narayan in the last 72 hours. Urinalysis:   No results found for: Maico Lair, BACTERIA, RBCUA, BLOODU, [address], [address]    Radiology:   Most recent    Chest CT      WITH CONTRAST:No results found for this or any previous visit. WITHOUT CONTRAST: No results found for this or any previous visit. CXR      2-view: No results found for this or any previous visit. Portable: No results found for this or any previous visit. Echo No results found for this or any previous visit.           Assessment/Plan:    Active Hospital Problems    Diagnosis Date Noted    H/O total knee replacement, right [Z96.651] 06/11/2019     1 - S/P right TKA, Hx OA   6/12 - tolerating post op well, for dc home today        Electronically signed by Jorge Luis Sevilla MD on 6/12/2019 at 1:05 PM

## 2019-06-12 NOTE — PROGRESS NOTES
Progress Note   TKA    Subjective:     Post-Operative Day: 1 Status Post right Total Knee Arthroplasty  Systemic or Specific Complaints:No Complaints    Objective:     CURRENT VITALS:  /65   Pulse 76   Temp 98.4 °F (36.9 °C) (Oral)   Resp 16   Ht 5' 9\" (1.753 m)   Wt 200 lb (90.7 kg)   LMP  (LMP Unknown)   SpO2 93%   BMI 29.53 kg/m²     General: alert, appears stated age and cooperative   Wound: Wound clean and dry no evidence of infection. Motion: Painless Range of Motion   DVT Exam: No evidence of DVT seen on physical exam.       Knee swollen but thigh soft to palpation. Moving foot and ankle. Good distal pulses. Data Review  Recent Labs     06/12/19  0531   WBC 10.6   RBC 3.64*   HGB 11.2*   HCT 33.0*   MCV 90.6   MCH 30.8   MCHC 34.0   RDW 14.3            Assessment:     Status Post right Total Knee Arthroplasty. Doing well postoperatively.      Plan:      1: Continues current post-op course :  2:  Continue Deep venous thrombosis prophylaxis  3:  Continue physical therapy  4:  Continue Pain Control

## 2019-06-12 NOTE — PROGRESS NOTES
Physical Therapy Med Surg Daily Treatment Note  Facility/Department: [facility]  Room: B498/I374-60       NAME: Simran Bartlett  : 1960 (35 y.o.)  MRN: [id number]  CODE STATUS: Full Code    Date of Service: 2019    Patient Diagnosis(es): H/O total knee replacement, right [Z96.651]   No chief complaint on file. Patient Active Problem List    Diagnosis Date Noted    H/O total knee replacement, right 2019    Prediabetes 2017    Mixed hyperlipidemia 2017    Colon polyps         Past Medical History:   Diagnosis Date    Colon polyps     Prediabetes      Past Surgical History:   Procedure Laterality Date    COLONOSCOPY  13    DR. CHEN, polyps needs 2016    COLONOSCOPY  2017    DR. CHEN     COLPOSCOPY      ENDOMETRIAL ABLATION      TOTAL KNEE ARTHROPLASTY Right 2019    RIGHT TOTAL KNEE ARTHROPLASTY performed by Sendy Steven MD at [facility]          Restrictions:  Restrictions/Precautions: Weight Bearing, Fall Risk  Lower Extremity Weight Bearing Restrictions  Right Lower Extremity Weight Bearing: Weight Bearing As Tolerated  Position Activity Restriction  Other position/activity restrictions: + SLR dc KI for ambulation. SUBJECTIVE:  General  Chart Reviewed: Yes  Family / Caregiver Present: No  Subjective  Subjective: I feel ready to go home. General Comment  Comments: + SLR dc KI for ambulation.      Pre Pain Assessment:  Pre Treatment Pain Screening  Pain at present: 4  Scale Used: Numeric Score  Intervention List: Patient able to continue with treatment  Pain Screening  Patient Currently in Pain: Yes       Post Pain Assessment:   Pain Assessment  Pain Assessment: 0-10  Pain Level: 5  Pain Type: Acute pain  Pain Location: Knee  Pain Orientation: Right       OBJECTIVE:         Bed mobility  Supine to Sit: Independent  Sit to Supine: Independent    Transfers  Sit to Stand: Stand by assistance  Stand to sit: Stand by assistance  Bed to Chair: Stand by assistance  Comment: good safety awareness and technique. Ambulation  Ambulation?: Yes  Ambulation 1  Surface: level tile  Device: Rolling Walker  Assistance: Stand by assistance  Quality of Gait: antalgic gait, step-to pattern progressing to step through with cues. Distance: 150'            Exercises  Comments: pt given written HEP instruction on technique dosage and frequency given to pt. Pt. verbalizes understanding. ASSESSMENT:  Body structures, Functions, Activity limitations: Decreased functional mobility ; Decreased endurance;Decreased balance;Decreased strength; Increased Pain;Decreased ROM    Assessment: pt with good mobility and safety awareness post op. Activity Tolerance  Activity Tolerance: Patient Tolerated treatment well       Discharge Recommendations:  Continue to assess pending progress, Patient would benefit from continued therapy after discharge    Goals:  Long term goals  Long term goal 1: indep with bed mobility  Long term goal 2: pt to transfer with supervision assist  Long term goal 3: pt to ambulate >150 ft supervision FWW  Long term goal 4: pt to navigate 3 steps with supervision  Patient Goals   Patient goals : to go home    PLAN:   Plan  Times per week: 5-7  Times per day: Twice a day  Current Treatment Recommendations: Strengthening, Balance Training, Transfer Training, Endurance Training, Gait Training, Home Exercise Program, Patient/Caregiver Education & Training, Modalities, ROM, Stair training, Functional Mobility Training, Pain Management, Safety Education & Training, Equipment Evaluation, Education, & procurement  Safety Devices  Type of devices:  All fall risk precautions in place, Bed alarm in place, Call light within reach, Left in bed(left with Ice pack on R knee instructed to take off in 15 minutes. )     [facility] (6 CLICK) BASIC MOBILITY  AM-PAC Inpatient Mobility Raw Score : 20      Therapy Time   Individual   Time In 1338   Time Out 1348   Minutes 10      Gait: 135 [address], [facility], 06/12/19 at 1:51 PM

## 2019-06-12 NOTE — PROGRESS NOTES
[facility] OCCUPATIONAL THERAPY ORTHOPEDIC TREATMENT NOTE     Date: 2019  Patient Name: Baljit Spears        MRN: [id number]  Account: [de-identified]   : 1960  (62 y.o.)  Room: [place] [address]    Chart Review:  Diagnosis:  There were no encounter diagnoses. Restrictions:    Restrictions/Precautions  Restrictions/Precautions: Weight Bearing, Fall Risk   KI until +SLR, Pt unable to demo SLR. KI donned. Subjective:  Patient states: \"I have been eating slow because I am a little nauseous. \"  Pain:   Start of tx:    3/10 throbbing R knee prior and     End of tx:  Pain Assessment  Pain Assessment: 0-10  Pain Level: 7  Pain Type: Acute pain  Pain Location: Knee  Pain Orientation: Right    Objective:    Educated pt on AE for LB dressing and safety in shower. Pt verbalized understanding. Pt declined use of AE. ADL  ADL  Feeding: Independent  Grooming: Independent  UE Bathing: Setup  LE Bathing: Supervision  UE Dressing: Setup  LE Dressing: Stand by assistance, Supervision(Pt with cues to to cross leg on top of surgical knee. Pt able to thread pants and underpants without AD. Pt able to don/doff socks without AD. Pt shifted weight while seated to wash buttocks.)  Toileting: Unable to assess(comment)(pt declined need )  Toilet Transfers  Toilet - Technique: Ambulating  Equipment Used: Standard bedside commode  Toilet Transfer: Unable to assess  Toilet Transfers Comments: with [employer]     Shower Transfers  Shower - Transfer From: [place] [address] - Transfer Type: To  Shower - Transfer To:  Shower seat with back  Shower - Technique: Ambulating  Shower Transfers: Stand by assistance      IND supine to sit   Sit to supine NT, pt seated in chair    DAVIS Hose donned:  [x]  Yes  []  No        Treatment consisted of:   [x] ADL Training  [] Strengthening   [] Transfer Training    [] DME Education  [] HEP   [] Manual Therapy   [] Patient Education  [] Other:      Assessment/Discharge Disposition:     Performance deficits /

## 2019-06-12 NOTE — CARE COORDINATION
[facility] NOTIFIED OF DISCHARGE TODAY. PATIENT WAS A PREHAB AND IS RESUME ALL CURRENT [address] CALL PLACED TO NIL[place] AT [place] [address]. NURSING UPDATED.

## 2019-06-12 NOTE — DISCHARGE INSTR - COC
Continuity of Care Form    Patient Name: Елена Kyle   :  1960  MRN:  [id number]    Admit date:  2019  Discharge date:  19    Code Status Order: Full Code   Advance Directives:   [facility] Documentation     Date/Time Healthcare Directive Type of Healthcare Directive Copy in [address] Agent's Name Healthcare Agent's Phone Number    [phone]  Yes, patient has an advance directive for healthcare treatment  Durable power of  for health care; Health care treatment directive; Living will  Yes, copy in chart  --  --  --          Admitting Physician:  Conchis Phillips MD  PCP: Luwanna Eisenmenger, MD    Discharging Nurse: SHELIA CULLEN Unit/Room#: F062/B298-83  Discharging Unit Phone Number: [phone]    Emergency Contact:   Extended Emergency Contact Information  Primary Emergency Contact:  [address] Phone: [phone]  Work Phone: [phone]  Mobile Phone: [phone]  Relation: Child    Past Surgical History:  Past Surgical History:   Procedure Laterality Date    COLONOSCOPY  13    DR. CHEN, polyps needs 2016    COLONOSCOPY  2017    DR. CHEN     COLPOSCOPY  2010    ENDOMETRIAL ABLATION      TOTAL KNEE ARTHROPLASTY Right 2019    RIGHT TOTAL KNEE ARTHROPLASTY performed by Conchis Phillips MD at [facility]       Immunization History:   Immunization History   Administered Date(s) Administered    Influenza, Kermitt Plaster, 3 Years and older, IM (Fluzone 3 yrs and older or Afluria 5 yrs and older) 11/15/2018    Tdap (Boostrix, Adacel) 11/15/2018       Active Problems:  Patient Active Problem List   Diagnosis Code    Mixed hyperlipidemia E78.2    Colon polyps K63.5    Prediabetes R73.03    H/O total knee replacement, right Z96.651       Isolation/Infection:   Isolation          No Isolation            Nurse Assessment:  Last Vital Signs: /65   Pulse 76   Temp 98.4 °F (36.9 °C) (Oral)   Resp 16   Ht 5' 9\" (1.753 m)   Wt 200 lb (90.7 kg)   LMP  (LMP Unknown)   SpO2 93%   BMI 29.53 kg/m²     Last documented pain score (0-10 scale): Pain Level: 5  Last Weight:   Wt Readings from Last 1 Encounters:   06/11/19 200 lb (90.7 kg)     Mental Status:  oriented, alert, coherent, logical, thought processes intact and able to concentrate and follow conversation    IV Access:  - None    Nursing Mobility/ADLs:  Walking   Assisted  Transfer  Assisted  Bathing  Assisted  Dressing  Assisted  Toileting  Assisted  Feeding  Assisted  Med Admin  Independent  Med Delivery   whole    Wound Care Documentation and Therapy:        Elimination:  Continence:   · Bowel: Yes  · Bladder: Yes  Urinary Catheter: None   Colostomy/Ileostomy/Ileal Conduit: No       Date of Last BM: 6/9/19    Intake/Output Summary (Last 24 hours) at 6/12/2019 0718  Last data filed at 6/12/2019 0646  Gross per 24 hour   Intake 2760 ml   Output --   Net 2760 ml     I/O last 3 completed shifts: In: 2760 [P.O.:360; I.V.:2400]  Out: -     Safety Concerns: At Risk for Falls    Impairments/Disabilities:      impaired mobility    Nutrition Therapy:  Current Nutrition Therapy:   - Oral Diet:  General    Routes of Feeding: Oral  Liquids: Thin Liquids  Daily Fluid Restriction: no  Last Modified Barium Swallow with Video (Video Swallowing Test): not done    Treatments at the Time of Hospital Discharge:   Respiratory Treatments: incentive spirometer  Oxygen Therapy:  is not on home oxygen therapy. Ventilator:    - No ventilator support    Rehab Therapies: Physical Therapy and Occupational Therapy  Weight Bearing Status/Restrictions: No weight bearing restirctions  Other Medical Equipment (for information only, NOT a DME order):   Walker and knee immobilizer  Other Treatments: TEDs, Ice, Do not remove aquacell dressing until 6/18/19-then daily dressing change with 4x4 gauze and light tape    Patient's personal belongings (please select all that are sent with patient):  all personal belongings sent with patient     RN SIGNATURE:  Electronically signed by Bakari Cabrera RN on 6/12/19 at 1:21 PM    CASE MANAGEMENT/SOCIAL WORK SECTION    Inpatient Status Date: ***    Readmission Risk Assessment Score:  Readmission Risk              Risk of Unplanned Readmission:        5           Discharging to Facility/ Agency   · Name: Nikki Esparza   · Address:  · Phone:[phone]  ·     / signature: Electronically signed by Sd Acevedo RN on 6/12/19 at 3:25 PM    PHYSICIAN SECTION    Prognosis: {Prognosis:8568362251}    Condition at Discharge: Ramiro Santos Patient Condition:993668210}    Rehab Potential (if transferring to Rehab): {Prognosis:6395399440}    Recommended Labs or Other Treatments After Discharge: ***    Physician Certification: I certify the above information and transfer of Alicia Guillen  is necessary for the continuing treatment of the diagnosis listed and that she requires {Admit to Appropriate Level of Care:36981} for {GREATER/LESS:921861308} 30 days.      Update Admission H&P: {CHP DME Changes in QTKI:432349398}    PHYSICIAN SIGNATURE:  Electronically signed by Ragini Bravo MD on 6/12/19 at 7:18 AM

## 2019-06-13 NOTE — PROGRESS NOTES
Physical Therapy  Facility/Department: [facility] D947/S913-69  Physical Therapy Discharge      NAME: Jerome Oconnor    : 1960 (80 y.o.)  MRN: [id number]    Account: [de-identified]  Gender: female      Patient has been discharged from acute care hospital. DC patient from current PT program.      Electronically signed by Svetlana Macias PT on 19 at 4:28 PM

## 2020-07-14 ENCOUNTER — HOSPITAL ENCOUNTER (OUTPATIENT)
Dept: WOMENS IMAGING | Age: 60
Discharge: HOME | End: 2020-07-16
Payer: COMMERCIAL

## 2020-07-14 PROCEDURE — 77063 BREAST TOMOSYNTHESIS BI: CPT

## 2022-02-14 ENCOUNTER — HOSPITAL ENCOUNTER (OUTPATIENT)
Dept: WOMENS IMAGING | Age: 62
Discharge: HOME | End: 2022-02-16
Payer: COMMERCIAL

## 2022-02-14 VITALS — WEIGHT: 195 LBS | HEIGHT: 69 IN | BODY MASS INDEX: 28.88 KG/M2

## 2022-02-14 DIAGNOSIS — Z12.31 ENCOUNTER FOR SCREENING MAMMOGRAM FOR MALIGNANT NEOPLASM OF BREAST: ICD-10-CM

## 2022-02-14 PROCEDURE — 77063 BREAST TOMOSYNTHESIS BI: CPT

## 2022-06-28 ENCOUNTER — HOSPITAL ENCOUNTER (EMERGENCY)
Age: 62
Discharge: HOME | End: 2022-06-28
Payer: COMMERCIAL

## 2022-06-28 ENCOUNTER — APPOINTMENT (OUTPATIENT)
Dept: GENERAL RADIOLOGY | Age: 62
End: 2022-06-28
Payer: COMMERCIAL

## 2022-06-28 VITALS
HEART RATE: 92 BPM | TEMPERATURE: 98.4 F | OXYGEN SATURATION: 97 % | HEIGHT: 69 IN | WEIGHT: 195 LBS | DIASTOLIC BLOOD PRESSURE: 92 MMHG | RESPIRATION RATE: 18 BRPM | BODY MASS INDEX: 28.88 KG/M2 | SYSTOLIC BLOOD PRESSURE: 144 MMHG

## 2022-06-28 DIAGNOSIS — M25.461 KNEE EFFUSION, RIGHT: Primary | ICD-10-CM

## 2022-06-28 PROCEDURE — 73564 X-RAY EXAM KNEE 4 OR MORE: CPT

## 2022-06-28 PROCEDURE — 73562 X-RAY EXAM OF KNEE 3: CPT

## 2022-06-28 PROCEDURE — 99283 EMERGENCY DEPT VISIT LOW MDM: CPT

## 2022-06-28 RX ORDER — NAPROXEN 500 MG/1
500 TABLET ORAL 2 TIMES DAILY
Qty: 20 TABLET | Refills: 0 | Status: SHIPPED | OUTPATIENT
Start: 2022-06-28 | End: 2022-07-08

## 2022-06-28 RX ORDER — HYDROCODONE BITARTRATE AND ACETAMINOPHEN 5; 325 MG/1; MG/1
1 TABLET ORAL EVERY 6 HOURS PRN
Qty: 10 TABLET | Refills: 0 | Status: SHIPPED | OUTPATIENT
Start: 2022-06-28 | End: 2022-07-01

## 2022-06-28 ASSESSMENT — ENCOUNTER SYMPTOMS
SHORTNESS OF BREATH: 0
ABDOMINAL PAIN: 0
BACK PAIN: 0
COUGH: 0

## 2022-06-28 ASSESSMENT — PAIN DESCRIPTION - LOCATION: LOCATION: KNEE

## 2022-06-28 ASSESSMENT — PAIN - FUNCTIONAL ASSESSMENT
PAIN_FUNCTIONAL_ASSESSMENT: 0-10
PAIN_FUNCTIONAL_ASSESSMENT: PREVENTS OR INTERFERES SOME ACTIVE ACTIVITIES AND ADLS

## 2022-06-28 ASSESSMENT — PAIN DESCRIPTION - ONSET: ONSET: ON-GOING

## 2022-06-28 ASSESSMENT — PAIN DESCRIPTION - DESCRIPTORS: DESCRIPTORS: ACHING;THROBBING

## 2022-06-28 ASSESSMENT — PAIN DESCRIPTION - ORIENTATION: ORIENTATION: RIGHT

## 2022-06-28 ASSESSMENT — PAIN SCALES - GENERAL: PAINLEVEL_OUTOF10: 4

## 2022-06-28 ASSESSMENT — PAIN DESCRIPTION - PAIN TYPE: TYPE: ACUTE PAIN

## 2022-06-28 ASSESSMENT — PAIN DESCRIPTION - FREQUENCY: FREQUENCY: INTERMITTENT

## 2022-06-28 NOTE — ED PROVIDER NOTES
[address] [facility] ED  eMERGENCY dEPARTMENT eNCOUnter      Pt Name: Khadar Boateng  MRN: [id number]  [place] 1960  Date of evaluation: 6/28/2022  Provider: HAJA Flaherty CNP      HISTORY OF PRESENT ILLNESS    Khadar Boateng is a 57 y.o. female who presents to the Emergency Department with R knee pain since Sunday. Patient denies any injury or trauma. She did have a TKR 3 yrs ago but has not had any problems since then. Pain is moderate. She is ambulating with a walker d/t pain. REVIEW OF SYSTEMS       Review of Systems   Constitutional: Negative for fever. HENT: Negative for congestion. Respiratory: Negative for cough and shortness of breath. Cardiovascular: Negative for chest pain. Gastrointestinal: Negative for abdominal pain. Genitourinary: Negative for dysuria. Musculoskeletal: Negative for arthralgias and back pain. R knee pain   Skin: Negative for rash. All other systems reviewed and are negative. PAST MEDICAL HISTORY     Past Medical History:   Diagnosis Date    Colon polyps     Prediabetes          SURGICAL HISTORY       Past Surgical History:   Procedure Laterality Date    BREAST BIOPSY Left 07/22/2011    Patient states B9    COLONOSCOPY  8/13/13    DR. CHEN, polyps needs 2016    COLONOSCOPY  01/23/2017    DR. CHEN     COLPOSCOPY  2010    ENDOMETRIAL ABLATION      TOTAL KNEE ARTHROPLASTY Right 6/11/2019    RIGHT TOTAL KNEE ARTHROPLASTY performed by Néstor Chu MD at [facility]       Previous Medications    ACETAMINOPHEN (TYLENOL) 500 MG TABLET    Take 500 mg by mouth every 6 hours as needed for Pain    ASPIRIN 81 MG EC TABLET    Take 1 tablet by mouth 2 times daily    B COMPLEX VITAMINS CAPSULE    Take 1 capsule by mouth daily    CALCIUM-VITAMIN D (CALCIUM 500/D) 500-200 MG-UNIT PER TABLET    Take 1 tablet by mouth daily    LORATADINE (CLEAR-ATADINE) 10 MG TABLET    Take 10 mg by mouth daily NAPROXEN-ESOMEPRAZOLE (VIMOVO) 500-20 MG TBEC    Take 1 tablet by mouth 2 times daily as needed (pain)    OMEGA-3 FATTY ACIDS (FISH OIL) 1000 MG CAPS    Take 3,000 mg by mouth 3 times daily       ALLERGIES     Cortisone, Meperidine, Metformin and related, and Shellfish allergy    FAMILY HISTORY     History reviewed. No pertinent family history. SOCIAL HISTORY       Social History     Socioeconomic History    Marital status:      Spouse name: None    Number of children: None    Years of education: None    Highest education level: None   Occupational History    None   Tobacco Use    Smoking status: Never Smoker    Smokeless tobacco: Never Used   Substance and Sexual Activity    Alcohol use: Yes     Comment: rare    Drug use: Not Currently    Sexual activity: Yes   Other Topics Concern    None   Social History Narrative    None     Social Determinants of Health     Financial Resource Strain:     Difficulty of Paying Living Expenses: Not on file   Food Insecurity:     Worried About Running Out of Food in the Last Year: Not on file    Brian of Food in the Last Year: Not on file   Transportation Needs:     Lack of Transportation (Medical): Not on file    Lack of Transportation (Non-Medical):  Not on file   Physical Activity:     Days of Exercise per Week: Not on file    Minutes of Exercise per Session: Not on file   Stress:     Feeling of Stress : Not on file   Social Connections:     Frequency of Communication with Friends and Family: Not on file    Frequency of Social Gatherings with Friends and Family: Not on file    Attends [employer]ism Services: Not on file    Active Member of Clubs or Organizations: Not on file    Attends Club or Organization Meetings: Not on file    Marital Status: Not on file   Intimate Partner Violence:     Fear of Current or Ex-Partner: Not on file    Emotionally Abused: Not on file    Physically Abused: Not on file    Sexually Abused: Not on file Housing Stability:     Unable to Pay for Housing in the Last Year: Not on file    Number of Places Lived in the Last Year: Not on file    Unstable Housing in the Last Year: Not on file       SCREENINGS    [place] Coma Scale  Eye Opening: Spontaneous  Best Verbal Response: Oriented  Best Motor Response: Obeys commands  [place] Coma Scale Score: 15 @FLOW(31872276)@      PHYSICAL EXAM    (up to 7 for level 4, 8 or more for level 5)     ED Triage Vitals [06/28/22 1100]   BP Temp Temp Source Heart Rate Resp SpO2 Height Weight   (!) 144/92 98.4 °F (36.9 °C) Oral 92 18 97 % 5' 9\" (1.753 m) 195 lb (88.5 kg)       Physical Exam  Vitals and nursing note reviewed. Constitutional:       Appearance: She is well-developed. HENT:      Head: Normocephalic and atraumatic. Right Ear: External ear normal.      Left Ear: External ear normal.   Eyes:      Conjunctiva/sclera: Conjunctivae normal.      Pupils: Pupils are equal, round, and reactive to light. Cardiovascular:      Rate and Rhythm: Normal rate and regular rhythm. Pulmonary:      Effort: Pulmonary effort is normal.      Breath sounds: Normal breath sounds. Abdominal:      General: Bowel sounds are normal. There is no distension. Palpations: Abdomen is soft. Tenderness: There is no abdominal tenderness. Musculoskeletal:         General: Normal range of motion. Cervical back: Normal range of motion and neck supple. Right knee: Swelling present. No erythema or crepitus. Tenderness present. Right lower leg: No edema. Right foot: Normal pulse. Legs:    Skin:     General: Skin is warm and dry. Neurological:      Mental Status: She is alert and oriented to person, place, and time. Deep Tendon Reflexes: Reflexes are normal and symmetric. Psychiatric:         Judgment: Judgment normal.           All other labs were within normal range or not returned as of this dictation.     EMERGENCY DEPARTMENT COURSE and DIFFERENTIALDIAGNOSIS/MDM:   Vitals:    Vitals:    06/28/22 1100   BP: (!) 144/92   Pulse: 92   Resp: 18   Temp: 98.4 °F (36.9 °C)   TempSrc: Oral   SpO2: 97%   Weight: 195 lb (88.5 kg)   Height: 5' 9\" (1.753 m)            57 yr old female with R knee effusion. Rx for Norco and Naprosyn were given to the patient. F/U with ortho in 1-2 days. Patient verbalizes understanding.        PROCEDURES:  Unless otherwise noted below, none     Procedures      FINAL IMPRESSION      1. Knee effusion, right          DISPOSITION/PLAN   DISPOSITION Decision To Discharge 06/28/2022 11:54:42 AM          HAJA Snell CNP (electronically signed)  Attending Emergency Physician     HAJA Snell CNP  06/28/22 7031

## 2022-06-28 NOTE — ED TRIAGE NOTES
Pt c/o right knee pain and edema denies trauma, sensation and movement intact, ambulatory with discomfort and a walker.

## 2023-03-24 PROBLEM — K63.5 COLON POLYPS: Status: ACTIVE | Noted: 2023-03-24

## 2023-03-24 PROBLEM — J30.2 SEASONAL ALLERGIES: Status: ACTIVE | Noted: 2023-03-24

## 2023-03-24 PROBLEM — M25.512 SHOULDER PAIN, BILATERAL: Status: ACTIVE | Noted: 2023-03-24

## 2023-03-24 PROBLEM — F41.9 ANXIETY DISORDER: Status: ACTIVE | Noted: 2023-03-24

## 2023-03-24 PROBLEM — E78.2 MIXED HYPERLIPIDEMIA: Status: ACTIVE | Noted: 2023-03-24

## 2023-03-24 PROBLEM — R73.9 HYPERGLYCEMIA: Status: ACTIVE | Noted: 2023-03-24

## 2023-03-24 PROBLEM — E66.811 CLASS 1 OBESITY WITH BODY MASS INDEX (BMI) OF 30.0 TO 30.9 IN ADULT: Status: ACTIVE | Noted: 2023-03-24

## 2023-03-24 PROBLEM — M19.90 DJD (DEGENERATIVE JOINT DISEASE): Status: RESOLVED | Noted: 2023-03-24 | Resolved: 2023-03-24

## 2023-03-24 PROBLEM — M25.819 SHOULDER IMPINGEMENT: Status: ACTIVE | Noted: 2023-03-24

## 2023-03-24 PROBLEM — M19.90 DJD (DEGENERATIVE JOINT DISEASE): Status: ACTIVE | Noted: 2023-03-24

## 2023-03-24 PROBLEM — J30.9 ALLERGIC RHINITIS: Status: ACTIVE | Noted: 2023-03-24

## 2023-03-24 PROBLEM — M17.5 OTHER UNILATERAL SECONDARY OSTEOARTHRITIS OF KNEE: Status: ACTIVE | Noted: 2023-03-24

## 2023-03-24 PROBLEM — R29.898 SHOULDER WEAKNESS: Status: ACTIVE | Noted: 2023-03-24

## 2023-03-24 PROBLEM — H93.19 TINNITUS: Status: ACTIVE | Noted: 2023-03-24

## 2023-03-24 PROBLEM — M25.50 ARTHRALGIA: Status: ACTIVE | Noted: 2023-03-24

## 2023-03-24 PROBLEM — M25.511 SHOULDER PAIN, BILATERAL: Status: ACTIVE | Noted: 2023-03-24

## 2023-03-24 PROBLEM — E66.9 CLASS 1 OBESITY WITH BODY MASS INDEX (BMI) OF 30.0 TO 30.9 IN ADULT: Status: ACTIVE | Noted: 2023-03-24

## 2023-03-24 PROBLEM — R19.7 DIARRHEA: Status: ACTIVE | Noted: 2023-03-24

## 2023-03-24 RX ORDER — LORATADINE 10 MG/1
1 TABLET ORAL DAILY
COMMUNITY
End: 2023-06-14 | Stop reason: ALTCHOICE

## 2023-03-24 RX ORDER — LOPERAMIDE HYDROCHLORIDE 2 MG/1
1 CAPSULE ORAL 2 TIMES DAILY
COMMUNITY
Start: 2022-10-31 | End: 2023-06-14 | Stop reason: ALTCHOICE

## 2023-03-24 RX ORDER — MELOXICAM 15 MG/1
1 TABLET ORAL DAILY
COMMUNITY
Start: 2022-08-17 | End: 2023-06-14 | Stop reason: SDUPTHER

## 2023-03-29 ENCOUNTER — OFFICE VISIT (OUTPATIENT)
Dept: PRIMARY CARE | Facility: CLINIC | Age: 63
End: 2023-03-29
Payer: MEDICARE

## 2023-03-29 VITALS
WEIGHT: 215 LBS | SYSTOLIC BLOOD PRESSURE: 112 MMHG | RESPIRATION RATE: 16 BRPM | HEART RATE: 80 BPM | TEMPERATURE: 97.4 F | HEIGHT: 69 IN | OXYGEN SATURATION: 98 % | BODY MASS INDEX: 31.84 KG/M2 | DIASTOLIC BLOOD PRESSURE: 72 MMHG

## 2023-03-29 DIAGNOSIS — Z01.818 PRE-OP EXAMINATION: Primary | ICD-10-CM

## 2023-03-29 DIAGNOSIS — J30.2 SEASONAL ALLERGIES: ICD-10-CM

## 2023-03-29 DIAGNOSIS — Z12.31 SCREENING MAMMOGRAM, ENCOUNTER FOR: ICD-10-CM

## 2023-03-29 DIAGNOSIS — E78.2 MIXED HYPERLIPIDEMIA: ICD-10-CM

## 2023-03-29 DIAGNOSIS — F41.9 ANXIETY DISORDER, UNSPECIFIED TYPE: ICD-10-CM

## 2023-03-29 DIAGNOSIS — Z12.11 COLON CANCER SCREENING: ICD-10-CM

## 2023-03-29 DIAGNOSIS — R73.9 HYPERGLYCEMIA: ICD-10-CM

## 2023-03-29 PROCEDURE — 1036F TOBACCO NON-USER: CPT | Performed by: FAMILY MEDICINE

## 2023-03-29 PROCEDURE — 93000 ELECTROCARDIOGRAM COMPLETE: CPT | Performed by: FAMILY MEDICINE

## 2023-03-29 PROCEDURE — 99214 OFFICE O/P EST MOD 30 MIN: CPT | Performed by: FAMILY MEDICINE

## 2023-03-29 PROCEDURE — 1160F RVW MEDS BY RX/DR IN RCRD: CPT | Performed by: FAMILY MEDICINE

## 2023-03-29 PROCEDURE — 1159F MED LIST DOCD IN RCRD: CPT | Performed by: FAMILY MEDICINE

## 2023-03-29 NOTE — PROGRESS NOTES
"Subjective   Patient ID: Vandana Cramer is a 63 y.o. female who presents for Pre-op Exam (Left rotator cuff repair-4/18/23-Hayley).  Covid vax: x 3  CRC:  2017  Mammogram: 2/2022  Pap: 12/2020  Lmp: ablation     HPI  Patient Active Problem List   Diagnosis    Allergic rhinitis    Colon polyps    Diarrhea    Arthralgia    Anxiety disorder    Hyperglycemia    Mixed hyperlipidemia    Seasonal allergies    Shoulder impingement    Shoulder pain, bilateral    Shoulder weakness    Tinnitus    Class 1 obesity with body mass index (BMI) of 30.0 to 30.9 in adult    Other unilateral secondary osteoarthritis of knee       Past Surgical History:   Procedure Laterality Date    COLONOSCOPY W/ POLYPECTOMY  2017    1 polyp    ENDOMETRIAL ABLATION  2009       Review of Systems  This patient has   NO history of recent Covid nor flu symptoms,  NO Fever nor chills,  NO Chest pain, shortness of breath nor paroxysmal nocturnal dyspnea,  NO Nausea, vomiting, nor diarrhea,  NO Hematochezia nor melena,  NO Dysuria, hematuria, nor new incontinence issues  NO new severe headaches nor neurological complaints,  NO new issues with anxiety nor depression nor new psychiatric complaints,  NO suicidal nor homicidal ideations.     OBJECTIVE:  /72   Pulse 80   Temp 36.3 °C (97.4 °F) (Temporal)   Resp 16   Ht 1.753 m (5' 9\")   Wt 97.5 kg (215 lb)   LMP  (LMP Unknown)   SpO2 98%   BMI 31.75 kg/m²      General:  alert, oriented, no acute distress.  No obvious skin rashes noted.   No gait disturbance noted.    Mood is pleasant, not tearful, no signs of emotional distress.  Not appearing intoxicated or altered.   No voiced delusions,   Normal, appropriate behavior.    HEENT: Normocephalic, atraumatic,   Pupils round, reactive to light  Extraocular motions intact and wnl  Tympanic membranes normal    Neck: no nuchal rigidity  No masses palpable.  No carotid bruits.  No thyromegaly.    Respiratory: Equal breath sounds  No wheezes,    rales,   "  nor rhonchi  No respiratory distress.    Heart: Regular rate and rhythm, no    murmurs  no rubs/gallops    Abdomen: no masses palpable, no rebound nor guarding, no rebound nor guarding.    Extremities: NO cyanosis noted, no clubbing.   No edema noted.  2+dorsalis pedis pulses.    Normal-not antalgic, steady gait.  Fflex abd and int rotation fairly NL but cannot int rotation to resistance    [facility] Encounter on 01/31/2023   Component Date Value Ref Range Status    Hemoglobin A1C 01/31/2023 5.6  % Final    Comment:      Diagnosis of Diabetes-Adults   Non-Diabetic: < or = 5.6%   Increased risk for developing diabetes: 5.7-6.4%   Diagnostic of diabetes: > or = 6.5%  .       Monitoring of Diabetes                Age (y)     Therapeutic Goal (%)   Adults:          >18           <7.0   Pediatrics:    13-18           <7.5                   7-12           <8.0                   0- 6            7.5-8.5   American Diabetes Association. Diabetes Care 33(S1), Jan 2010.      Estimated Average Glucose 01/31/2023 114  MG/DL Final    Glucose 01/31/2023 112 (H)  74 - 99 mg/dL Final    Sodium 01/31/2023 140  136 - 145 mmol/L Final    Potassium 01/31/2023 4.0  3.5 - 5.3 mmol/L Final    Chloride 01/31/2023 104  98 - 107 mmol/L Final    Bicarbonate 01/31/2023 27  21 - 32 mmol/L Final    Anion Gap 01/31/2023 13  10 - 20 mmol/L Final    Urea Nitrogen 01/31/2023 20  6 - 23 mg/dL Final    Creatinine 01/31/2023 1.00  0.50 - 1.05 mg/dL Final    GFR Female 01/31/2023 63  >90 mL/min/1.73m2 Final    Comment:  CALCULATIONS OF ESTIMATED GFR ARE PERFORMED   USING THE 2021 CKD-EPI STUDY REFIT EQUATION   WITHOUT THE RACE VARIABLE FOR THE IDMS-TRACEABLE   CREATININE METHODS.    https://jasn.asnjournals.org/content/early/2021/09/22/ASN.1998992943      Calcium 01/31/2023 10.5 (H)  8.6 - 10.3 mg/dL Final    Albumin 01/31/2023 4.2  3.4 - 5.0 g/dL Final    Alkaline Phosphatase 01/31/2023 53  33 - 136 U/L Final    Total Protein 01/31/2023 7.3  6.4 - 8.2  g/dL Final    AST 01/31/2023 17  9 - 39 U/L Final    Total Bilirubin 01/31/2023 0.5  0.0 - 1.2 mg/dL Final    ALT (SGPT) 01/31/2023 11  7 - 45 U/L Final    Comment:  Patients treated with Sulfasalazine may generate    falsely decreased results for ALT.      WBC 01/31/2023 5.8  4.4 - 11.3 x10E9/L Final    RBC 01/31/2023 4.46  4.00 - 5.20 x10E12/L Final    Hemoglobin 01/31/2023 13.4  12.0 - 16.0 g/dL Final    Hematocrit 01/31/2023 41.0  36.0 - 46.0 % Final    MCV 01/31/2023 92  80 - 100 fL Final    MCHC 01/31/2023 32.7  32.0 - 36.0 g/dL Final    Platelets 01/31/2023 281  150 - 450 x10E9/L Final    RDW 01/31/2023 14.0  11.5 - 14.5 % Final    Cholesterol 01/31/2023 218 (H)  0 - 199 mg/dL Final    Comment: .      AGE      DESIRABLE   BORDERLINE HIGH   HIGH     0-19 Y     0 - 169       170 - 199     >/= 200    20-24 Y     0 - 189       190 - 224     >/= 225         >24 Y     0 - 199       200 - 239     >/= 240   **All ranges are based on fasting samples. Specific   therapeutic targets will vary based on patient-specific   cardiac risk.  .   Pediatric guidelines reference:Pediatrics 2011, 128(S5).   Adult guidelines reference: NCEP ATPIII Guidelines,     SCOTT 2001, 258:2486-97  .   Venipuncture immediately after or during the    administration of Metamizole may lead to falsely   low results. Testing should be performed immediately   prior to Metamizole dosing.      HDL 01/31/2023 65.5  mg/dL Final    Comment: .      AGE      VERY LOW   LOW     NORMAL    HIGH       0-19 Y       < 35   < 40     40-45     ----    20-24 Y       ----   < 40       >45     ----      >24 Y       ----   < 40     40-60      >60  .      Cholesterol/HDL Ratio 01/31/2023 3.3   Final    Comment: REF VALUES  DESIRABLE  < 3.4  HIGH RISK  > 5.0      LDL 01/31/2023 129 (H)  0 - 99 mg/dL Final    Comment: .                           NEAR      BORD      AGE      DESIRABLE  OPTIMAL    HIGH     HIGH     VERY HIGH     0-19 Y     0 - 109     ---    110-129   >/=  130     ----    20-24 Y     0 - 119     ---    120-159   >/= 160     ----      >24 Y     0 -  99   100-129  130-159   160-189     >/=190  .      VLDL 01/31/2023 24  0 - 40 mg/dL Final    Triglycerides 01/31/2023 118  0 - 149 mg/dL Final    Comment: .      AGE      DESIRABLE   BORDERLINE HIGH   HIGH     VERY HIGH   0 D-90 D    19 - 174         ----         ----        ----  91 D- 9 Y     0 -  74        75 -  99     >/= 100      ----    10-19 Y     0 -  89        90 - 129     >/= 130      ----    20-24 Y     0 - 114       115 - 149     >/= 150      ----         >24 Y     0 - 149       150 - 199    200- 499    >/= 500  .   Venipuncture immediately after or during the    administration of Metamizole may lead to falsely   low results. Testing should be performed immediately   prior to Metamizole dosing.          Assessment/Plan     Problem List Items Addressed This Visit          Other    Anxiety disorder    Hyperglycemia    Mixed hyperlipidemia    Seasonal allergies     Other Visit Diagnoses       Pre-op examination    -  Primary    Relevant Orders    ECG 12 lead (Clinic Performed)    Screening mammogram, encounter for        Relevant Orders    BI mammo bilateral screening tomosynthesis    Colon cancer screening        Relevant Orders    Referral to Gastroenterology          The patient is aware that results will be forthcoming of ALL planned labs and or tests. If no results are received on my chart or by letter within 1 - 3 weeks, the patient is aware they need to call to obtain results as this is not usual.   Acceptable surgery risk if labs ok  Ekg ok

## 2023-04-01 ENCOUNTER — APPOINTMENT (OUTPATIENT)
Dept: PRIMARY CARE | Facility: CLINIC | Age: 63
End: 2023-04-01
Payer: MEDICARE

## 2023-04-17 ENCOUNTER — ANESTHESIA EVENT (OUTPATIENT)
Dept: OPERATING ROOM | Age: 63
End: 2023-04-17
Payer: MEDICARE

## 2023-04-18 ENCOUNTER — HOSPITAL ENCOUNTER (OUTPATIENT)
Age: 63
Setting detail: OUTPATIENT SURGERY
Discharge: HOME | End: 2023-04-18
Attending: ORTHOPAEDIC SURGERY | Admitting: ORTHOPAEDIC SURGERY
Payer: MEDICARE

## 2023-04-18 ENCOUNTER — ANESTHESIA (OUTPATIENT)
Dept: OPERATING ROOM | Age: 63
End: 2023-04-18
Payer: MEDICARE

## 2023-04-18 ENCOUNTER — APPOINTMENT (OUTPATIENT)
Dept: ULTRASOUND IMAGING | Age: 63
End: 2023-04-18
Attending: ORTHOPAEDIC SURGERY
Payer: MEDICARE

## 2023-04-18 VITALS
BODY MASS INDEX: 30.81 KG/M2 | WEIGHT: 208 LBS | RESPIRATION RATE: 20 BRPM | DIASTOLIC BLOOD PRESSURE: 73 MMHG | OXYGEN SATURATION: 94 % | TEMPERATURE: 97.1 F | HEART RATE: 72 BPM | HEIGHT: 69 IN | SYSTOLIC BLOOD PRESSURE: 125 MMHG

## 2023-04-18 PROCEDURE — 2709999900 HC NON-CHARGEABLE SUPPLY: Performed by: ORTHOPAEDIC SURGERY

## 2023-04-18 PROCEDURE — 2580000003 HC RX 258: Performed by: ORTHOPAEDIC SURGERY

## 2023-04-18 PROCEDURE — 2720000010 HC SURG SUPPLY STERILE: Performed by: ORTHOPAEDIC SURGERY

## 2023-04-18 PROCEDURE — 3600000013 HC SURGERY LEVEL 3 ADDTL 15MIN: Performed by: ORTHOPAEDIC SURGERY

## 2023-04-18 PROCEDURE — 2580000003 HC RX 258: Performed by: STUDENT IN AN ORGANIZED HEALTH CARE EDUCATION/TRAINING PROGRAM

## 2023-04-18 PROCEDURE — C1713 ANCHOR/SCREW BN/BN,TIS/BN: HCPCS | Performed by: ORTHOPAEDIC SURGERY

## 2023-04-18 PROCEDURE — 3700000000 HC ANESTHESIA ATTENDED CARE: Performed by: ORTHOPAEDIC SURGERY

## 2023-04-18 PROCEDURE — 6360000002 HC RX W HCPCS: Performed by: ORTHOPAEDIC SURGERY

## 2023-04-18 PROCEDURE — 7100000001 HC PACU RECOVERY - ADDTL 15 MIN: Performed by: ORTHOPAEDIC SURGERY

## 2023-04-18 PROCEDURE — A4217 STERILE WATER/SALINE, 500 ML: HCPCS | Performed by: ORTHOPAEDIC SURGERY

## 2023-04-18 PROCEDURE — 2500000003 HC RX 250 WO HCPCS: Performed by: STUDENT IN AN ORGANIZED HEALTH CARE EDUCATION/TRAINING PROGRAM

## 2023-04-18 PROCEDURE — 7100000010 HC PHASE II RECOVERY - FIRST 15 MIN: Performed by: ORTHOPAEDIC SURGERY

## 2023-04-18 PROCEDURE — 7100000000 HC PACU RECOVERY - FIRST 15 MIN: Performed by: ORTHOPAEDIC SURGERY

## 2023-04-18 PROCEDURE — 64415 NJX AA&/STRD BRCH PLXS IMG: CPT | Performed by: STUDENT IN AN ORGANIZED HEALTH CARE EDUCATION/TRAINING PROGRAM

## 2023-04-18 PROCEDURE — 7100000011 HC PHASE II RECOVERY - ADDTL 15 MIN: Performed by: ORTHOPAEDIC SURGERY

## 2023-04-18 PROCEDURE — 3600000003 HC SURGERY LEVEL 3 BASE: Performed by: ORTHOPAEDIC SURGERY

## 2023-04-18 PROCEDURE — 3700000001 HC ADD 15 MINUTES (ANESTHESIA): Performed by: ORTHOPAEDIC SURGERY

## 2023-04-18 PROCEDURE — 6360000002 HC RX W HCPCS: Performed by: STUDENT IN AN ORGANIZED HEALTH CARE EDUCATION/TRAINING PROGRAM

## 2023-04-18 PROCEDURE — 76942 ECHO GUIDE FOR BIOPSY: CPT

## 2023-04-18 DEVICE — ANCHOR SUTURE BIOCOMP 4.75X19.1 MM SWIVELOCK C: Type: IMPLANTABLE DEVICE | Site: SHOULDER | Status: FUNCTIONAL

## 2023-04-18 RX ORDER — SODIUM CHLORIDE 0.9 % (FLUSH) 0.9 %
5-40 SYRINGE (ML) INJECTION PRN
Status: DISCONTINUED | OUTPATIENT
Start: 2023-04-18 | End: 2023-04-18 | Stop reason: HOSPADM

## 2023-04-18 RX ORDER — MAGNESIUM HYDROXIDE 1200 MG/15ML
LIQUID ORAL CONTINUOUS PRN
Status: COMPLETED | OUTPATIENT
Start: 2023-04-18 | End: 2023-04-18

## 2023-04-18 RX ORDER — MORPHINE SULFATE 2 MG/ML
2 INJECTION, SOLUTION INTRAMUSCULAR; INTRAVENOUS
Status: DISCONTINUED | OUTPATIENT
Start: 2023-04-18 | End: 2023-04-18 | Stop reason: HOSPADM

## 2023-04-18 RX ORDER — LIDOCAINE HYDROCHLORIDE 10 MG/ML
INJECTION, SOLUTION INFILTRATION; PERINEURAL
Status: COMPLETED
Start: 2023-04-18 | End: 2023-04-18

## 2023-04-18 RX ORDER — ROCURONIUM BROMIDE 10 MG/ML
INJECTION, SOLUTION INTRAVENOUS PRN
Status: DISCONTINUED | OUTPATIENT
Start: 2023-04-18 | End: 2023-04-18 | Stop reason: SDUPTHER

## 2023-04-18 RX ORDER — HYDRALAZINE HYDROCHLORIDE 20 MG/ML
10 INJECTION INTRAMUSCULAR; INTRAVENOUS
Status: DISCONTINUED | OUTPATIENT
Start: 2023-04-18 | End: 2023-04-18 | Stop reason: HOSPADM

## 2023-04-18 RX ORDER — SODIUM CHLORIDE, SODIUM LACTATE, POTASSIUM CHLORIDE, CALCIUM CHLORIDE 600; 310; 30; 20 MG/100ML; MG/100ML; MG/100ML; MG/100ML
1000 INJECTION, SOLUTION INTRAVENOUS CONTINUOUS
Status: DISCONTINUED | OUTPATIENT
Start: 2023-04-18 | End: 2023-04-18 | Stop reason: HOSPADM

## 2023-04-18 RX ORDER — OXYCODONE HYDROCHLORIDE 5 MG/1
5 TABLET ORAL EVERY 4 HOURS PRN
Status: DISCONTINUED | OUTPATIENT
Start: 2023-04-18 | End: 2023-04-18 | Stop reason: HOSPADM

## 2023-04-18 RX ORDER — SODIUM CHLORIDE 0.9 % (FLUSH) 0.9 %
5-40 SYRINGE (ML) INJECTION EVERY 12 HOURS SCHEDULED
Status: DISCONTINUED | OUTPATIENT
Start: 2023-04-18 | End: 2023-04-18 | Stop reason: HOSPADM

## 2023-04-18 RX ORDER — PROCHLORPERAZINE EDISYLATE 5 MG/ML
5 INJECTION INTRAMUSCULAR; INTRAVENOUS
Status: DISCONTINUED | OUTPATIENT
Start: 2023-04-18 | End: 2023-04-18 | Stop reason: HOSPADM

## 2023-04-18 RX ORDER — KETOROLAC TROMETHAMINE 30 MG/ML
INJECTION, SOLUTION INTRAMUSCULAR; INTRAVENOUS PRN
Status: DISCONTINUED | OUTPATIENT
Start: 2023-04-18 | End: 2023-04-18 | Stop reason: SDUPTHER

## 2023-04-18 RX ORDER — PROPOFOL 10 MG/ML
INJECTION, EMULSION INTRAVENOUS PRN
Status: DISCONTINUED | OUTPATIENT
Start: 2023-04-18 | End: 2023-04-18 | Stop reason: SDUPTHER

## 2023-04-18 RX ORDER — SODIUM CHLORIDE, SODIUM LACTATE, POTASSIUM CHLORIDE, CALCIUM CHLORIDE 600; 310; 30; 20 MG/100ML; MG/100ML; MG/100ML; MG/100ML
INJECTION, SOLUTION INTRAVENOUS CONTINUOUS
Status: DISCONTINUED | OUTPATIENT
Start: 2023-04-18 | End: 2023-04-18 | Stop reason: HOSPADM

## 2023-04-18 RX ORDER — FENTANYL CITRATE 50 UG/ML
50 INJECTION, SOLUTION INTRAMUSCULAR; INTRAVENOUS EVERY 5 MIN PRN
Status: DISCONTINUED | OUTPATIENT
Start: 2023-04-18 | End: 2023-04-18 | Stop reason: HOSPADM

## 2023-04-18 RX ORDER — SODIUM CHLORIDE 9 MG/ML
INJECTION, SOLUTION INTRAVENOUS PRN
Status: DISCONTINUED | OUTPATIENT
Start: 2023-04-18 | End: 2023-04-18 | Stop reason: HOSPADM

## 2023-04-18 RX ORDER — MIDAZOLAM HYDROCHLORIDE 1 MG/ML
INJECTION INTRAMUSCULAR; INTRAVENOUS PRN
Status: DISCONTINUED | OUTPATIENT
Start: 2023-04-18 | End: 2023-04-18 | Stop reason: SDUPTHER

## 2023-04-18 RX ORDER — ROPIVACAINE HYDROCHLORIDE 5 MG/ML
INJECTION, SOLUTION EPIDURAL; INFILTRATION; PERINEURAL
Status: COMPLETED
Start: 2023-04-18 | End: 2023-04-18

## 2023-04-18 RX ORDER — DIPHENHYDRAMINE HYDROCHLORIDE 50 MG/ML
12.5 INJECTION INTRAMUSCULAR; INTRAVENOUS
Status: DISCONTINUED | OUTPATIENT
Start: 2023-04-18 | End: 2023-04-18 | Stop reason: HOSPADM

## 2023-04-18 RX ORDER — DROPERIDOL 2.5 MG/ML
0.62 INJECTION, SOLUTION INTRAMUSCULAR; INTRAVENOUS
Status: DISCONTINUED | OUTPATIENT
Start: 2023-04-18 | End: 2023-04-18 | Stop reason: HOSPADM

## 2023-04-18 RX ORDER — ROPIVACAINE HYDROCHLORIDE 5 MG/ML
INJECTION, SOLUTION EPIDURAL; INFILTRATION; PERINEURAL
Status: COMPLETED | OUTPATIENT
Start: 2023-04-18 | End: 2023-04-18

## 2023-04-18 RX ORDER — LIDOCAINE HYDROCHLORIDE 10 MG/ML
INJECTION, SOLUTION EPIDURAL; INFILTRATION; INTRACAUDAL; PERINEURAL PRN
Status: DISCONTINUED | OUTPATIENT
Start: 2023-04-18 | End: 2023-04-18 | Stop reason: SDUPTHER

## 2023-04-18 RX ORDER — SODIUM CHLORIDE 9 MG/ML
50 INJECTION, SOLUTION INTRAVENOUS CONTINUOUS
Status: DISCONTINUED | OUTPATIENT
Start: 2023-04-18 | End: 2023-04-18 | Stop reason: HOSPADM

## 2023-04-18 RX ORDER — DEXAMETHASONE SODIUM PHOSPHATE 4 MG/ML
INJECTION, SOLUTION INTRA-ARTICULAR; INTRALESIONAL; INTRAMUSCULAR; INTRAVENOUS; SOFT TISSUE PRN
Status: DISCONTINUED | OUTPATIENT
Start: 2023-04-18 | End: 2023-04-18 | Stop reason: SDUPTHER

## 2023-04-18 RX ORDER — FENTANYL CITRATE 50 UG/ML
INJECTION, SOLUTION INTRAMUSCULAR; INTRAVENOUS PRN
Status: DISCONTINUED | OUTPATIENT
Start: 2023-04-18 | End: 2023-04-18 | Stop reason: SDUPTHER

## 2023-04-18 RX ORDER — MORPHINE SULFATE 4 MG/ML
4 INJECTION, SOLUTION INTRAMUSCULAR; INTRAVENOUS
Status: DISCONTINUED | OUTPATIENT
Start: 2023-04-18 | End: 2023-04-18 | Stop reason: HOSPADM

## 2023-04-18 RX ORDER — SODIUM CHLORIDE, SODIUM LACTATE, POTASSIUM CHLORIDE, CALCIUM CHLORIDE 600; 310; 30; 20 MG/100ML; MG/100ML; MG/100ML; MG/100ML
INJECTION, SOLUTION INTRAVENOUS CONTINUOUS PRN
Status: DISCONTINUED | OUTPATIENT
Start: 2023-04-18 | End: 2023-04-18 | Stop reason: SDUPTHER

## 2023-04-18 RX ORDER — ONDANSETRON 2 MG/ML
INJECTION INTRAMUSCULAR; INTRAVENOUS PRN
Status: DISCONTINUED | OUTPATIENT
Start: 2023-04-18 | End: 2023-04-18 | Stop reason: SDUPTHER

## 2023-04-18 RX ADMIN — DEXAMETHASONE SODIUM PHOSPHATE 8 MG: 4 INJECTION, SOLUTION INTRAMUSCULAR; INTRAVENOUS at 08:03

## 2023-04-18 RX ADMIN — LIDOCAINE HYDROCHLORIDE 60 MG: 10 INJECTION, SOLUTION EPIDURAL; INFILTRATION; INTRACAUDAL; PERINEURAL at 07:47

## 2023-04-18 RX ADMIN — ONDANSETRON 4 MG: 2 INJECTION INTRAMUSCULAR; INTRAVENOUS at 08:59

## 2023-04-18 RX ADMIN — MIDAZOLAM HYDROCHLORIDE 2 MG: 2 INJECTION, SOLUTION INTRAMUSCULAR; INTRAVENOUS at 07:30

## 2023-04-18 RX ADMIN — PHENYLEPHRINE HYDROCHLORIDE 50 MCG: 10 INJECTION INTRAVENOUS at 08:45

## 2023-04-18 RX ADMIN — SODIUM CHLORIDE, POTASSIUM CHLORIDE, SODIUM LACTATE AND CALCIUM CHLORIDE: 600; 310; 30; 20 INJECTION, SOLUTION INTRAVENOUS at 07:46

## 2023-04-18 RX ADMIN — PHENYLEPHRINE HYDROCHLORIDE 150 MCG: 10 INJECTION INTRAVENOUS at 08:28

## 2023-04-18 RX ADMIN — SODIUM CHLORIDE, POTASSIUM CHLORIDE, SODIUM LACTATE AND CALCIUM CHLORIDE 1000 ML: 600; 310; 30; 20 INJECTION, SOLUTION INTRAVENOUS at 07:20

## 2023-04-18 RX ADMIN — PHENYLEPHRINE HYDROCHLORIDE 100 MCG: 10 INJECTION INTRAVENOUS at 08:56

## 2023-04-18 RX ADMIN — ROCURONIUM BROMIDE 50 MG: 50 INJECTION INTRAVENOUS at 07:47

## 2023-04-18 RX ADMIN — ROPIVACAINE HYDROCHLORIDE 20 ML: 5 INJECTION, SOLUTION EPIDURAL; INFILTRATION; PERINEURAL at 07:35

## 2023-04-18 RX ADMIN — CEFAZOLIN 2000 MG: 2 INJECTION, POWDER, FOR SOLUTION INTRAMUSCULAR; INTRAVENOUS at 07:57

## 2023-04-18 RX ADMIN — KETOROLAC TROMETHAMINE 30 MG: 30 INJECTION, SOLUTION INTRAMUSCULAR at 08:59

## 2023-04-18 RX ADMIN — PROPOFOL 200 MG: 10 INJECTION, EMULSION INTRAVENOUS at 07:47

## 2023-04-18 RX ADMIN — FENTANYL CITRATE 100 MCG: 50 INJECTION INTRAMUSCULAR; INTRAVENOUS at 07:47

## 2023-04-18 ASSESSMENT — PAIN SCALES - GENERAL
PAINLEVEL_OUTOF10: 0

## 2023-04-18 ASSESSMENT — PAIN - FUNCTIONAL ASSESSMENT: PAIN_FUNCTIONAL_ASSESSMENT: 0-10

## 2023-04-18 ASSESSMENT — PAIN DESCRIPTION - DESCRIPTORS: DESCRIPTORS: ACHING

## 2023-04-18 NOTE — ANESTHESIA PRE PROCEDURE
hyperlipidemia E78.2    Colon polyps K63.5    Prediabetes R73.03    H/O total knee replacement, right Z96.651       Past Medical History:        Diagnosis Date    Arthritis     Colon polyps     Prediabetes        Past Surgical History:        Procedure Laterality Date    BREAST BIOPSY Left 07/22/2011    Patient states B9    COLONOSCOPY  8/13/13    DR. CHEN, polyps needs 2016    COLONOSCOPY  01/23/2017    DR. CHEN     COLPOSCOPY  2010    ENDOMETRIAL ABLATION      TOTAL KNEE ARTHROPLASTY Right 6/11/2019    RIGHT TOTAL KNEE ARTHROPLASTY performed by Florie Prader, MD at [place] [address] History:    Social History     Tobacco Use    Smoking status: Never    Smokeless tobacco: Never   Substance Use Topics    Alcohol use: Yes     Comment: rare                                Counseling given: Not Answered      Vital Signs (Current): There were no vitals filed for this visit.                                            BP Readings from Last 3 Encounters:   04/05/23 (!) 144/87   06/28/22 (!) 144/92   06/12/19 125/65       NPO Status:                                                                                 BMI:   Wt Readings from Last 3 Encounters:   04/05/23 209 lb 9.6 oz (95.1 kg)   06/28/22 195 lb (88.5 kg)   02/14/22 195 lb (88.5 kg)     There is no height or weight on file to calculate BMI.    CBC:   Lab Results   Component Value Date/Time    WBC 8.1 04/05/2023 12:05 PM    RBC 4.52 04/05/2023 12:05 PM    HGB 13.4 04/05/2023 12:05 PM    HCT 40.5 04/05/2023 12:05 PM    MCV 89.5 04/05/2023 12:05 PM    RDW 14.1 04/05/2023 12:05 PM     04/05/2023 12:05 PM       CMP:   Lab Results   Component Value Date/Time     04/05/2023 12:05 PM    K 4.2 04/05/2023 12:05 PM    K 4.3 06/12/2019 05:31 AM     04/05/2023 12:05 PM    CO2 24 04/05/2023 12:05 PM    BUN 21 04/05/2023 12:05 PM    CREATININE 0.87 04/05/2023 12:05 PM    GFRAA >60.0 06/12/2019 05:31 AM    LABGLOM >60.0 04/05/2023 12:05 PM

## 2023-04-18 NOTE — DISCHARGE INSTRUCTIONS
partial weight bearing  [] Full weight bearing as tolerated   []  Use brace whenever walking   [] other      10) Begin physical therapy if advised by you physician:   [] before returning to see you doctor   [x] not until you follow up with your doctor    11) call your doctor at [phone] for an appointment (or follow up as scheduled)    [facility] office if  Increased redness, swelling, drainage of any kind, and/or pain to surgery site. As well as new onset fevers and or chills. These could signify an infection. Calf or thigh tenderness to touch as well as increased swelling or redness. This could signify a clot formation. Numbness or tingling to an area around the incision site or below the incision site (toes). Or if the operative extremity becomes cold, blue. Any rash appears, increased  or new onset nausea/vomiting occur. This may indicate a reaction to a medication. Temp is 38.5 C (101F)  12) If you have any concerns or questions, please call [facility] surgeon on call.  The 24- hour phone is [phone]) If you are unable to contact your surgeon, in an emergency situation, go to the nearest hospital

## 2023-04-18 NOTE — PROGRESS NOTES
Discharge instructions provided to patient. Pt verbalized understanding of all DC instructions and education provided. Pt voiced no questions or concerns at this time.

## 2023-04-18 NOTE — OP NOTE
and [employer]. Final  probing and rotation revealed excellent stability to the repair. The  space was irrigated through the shaver and suctioned dry. Portal sites  were closed with 3-0 nylon suture. All sponge and needle counts were  correct prior to closure. Sterile dressing was applied. She was placed  in her sling and was awoken from her anesthetic. She was taken to  recovery room in stable condition. Brenda Saha PA-C was present throughout the entire case. Given the  nature of the disease process and the procedure, a skilled surgical  first assistant was necessary during the case. The assistant was  necessary to hold retractors and manipulate the extremity during the  procedure. A certified scrub tech was at the back table managing the  instruments and supplies for the surgical case.         Hunter Ragsdale MD    D: 04/18/2023 9:11:24       T: 04/18/2023 9:13:57     [email or web address]  Job#: [id number]     Doc#: [id number]    CC:

## 2023-04-18 NOTE — PROGRESS NOTES
0945 Pt transitioned to phase II. Pt remains awake, alert, and oriented. Pt's vital signs stable. Pt 94-96% on RA. Denies SOB. Neuro checks completed, WNL. Pt reports numbness/tingling in left hand. Ice applied to left shoulder. Dressing dry and intact. Sling in place. Pt continues to deny pain at this time.

## 2023-04-18 NOTE — PROGRESS NOTES
0915 Pt admitted to PACU bed 2. Pt awake, alert, and oriented. Pt attempted to sit up. Pt on Simple mask, oxygen at 8L. Vitals stable. Pt reported no pain at this time. Neuro check completed. Pt reports numbness in left arm/hand.

## 2023-04-18 NOTE — BRIEF OP NOTE
Brief Postoperative Note      Patient: Toshia Robles  YOB: 1960  MRN: [id number]    Date of Procedure: 4/18/2023    Pre-Op Diagnosis Codes:     * Rotator cuff syndrome of left shoulder [M75.102]    Post-Op Diagnosis: Same       Procedure(s):  LEFT SHOULDER ARTHROSCOPY SHOULDER  ROTATOR CUFF REPAIR LATERAL, ARTHREX SCALENE BLOCK    Surgeon(s):  Lacy Giang MD    Assistant:  Physician Assistant: Maria Alejandra Bullock PA-C    Anesthesia: General    Estimated Blood Loss (mL): Minimal    Complications: None    Specimens:   * No specimens in log *    Implants:  Implant Name Type Inv.  Item Serial No.  Lot No. LRB No. Used Action   [facility] SUTURE BIOCOMP 4.75X19.1 MM Aggie Hernandez [id number]  [facility] SUTURE BIOCOMP 4.75X19.1 MM [employer] [id number] Left 1 Implanted   [facility] SUTURE BIOCOMP 4.75X19.1 MM Aggie Hernandez [id number]  [facility] SUTURE BIOCOMP 4.75X19.1 MM [employer] [id number] Left 1 Implanted   [facility] SUTURE BIOCOMP 4.75X19.1 MM Aggie Hernandez [id number]  [facility] SUTURE BIOCOMP 4.75X19.1 MM [employer] [id number] Left 1 Implanted         Drains: * No LDAs found *    Findings: none      Electronically signed by Lacy Giang MD on 4/18/2023 at 9:05 AM

## 2023-04-18 NOTE — ANESTHESIA PROCEDURE NOTES
Peripheral Block    Patient location during procedure: pre-op  Reason for block: post-op pain management and at surgeon's request  Start time: 4/18/2023 7:30 AM  End time: 4/18/2023 7:40 AM  Staffing  Performed: anesthesiologist   Anesthesiologist: Noa Weiner DO  Preanesthetic Checklist  Completed: patient identified, IV checked, site marked, risks and benefits discussed, surgical/procedural consents, equipment checked, pre-op evaluation, timeout performed, anesthesia consent given, oxygen available and monitors applied/VS acknowledged  Peripheral Block   Patient position: supine  Prep: ChloraPrep  Provider prep: mask and sterile gloves (Sterile probe cover)  Patient monitoring: cardiac monitor, continuous pulse ox, frequent blood pressure checks and IV access  Block type: Brachial plexus  Interscalene  Laterality: left  Injection technique: single-shot  Guidance: nerve stimulator and ultrasound guided    Needle   Needle type: combined needle/nerve stimulator   Needle gauge: 22 G  Needle localization: anatomical landmarks and ultrasound guidance  Needle length: 5 cm  Assessment   Injection assessment: negative aspiration for heme, no paresthesia on injection and local visualized surrounding nerve on ultrasound  Paresthesia pain: immediately resolved  Slow fractionated injection: yes  Hemodynamics: stable  Real-time US image taken/store: yes    Additional Notes  Ultrasound image printed and saved in patient chart.     Sterile probe cover used    Medications Administered  ropivacaine (NAROPIN) injection 0.5% - Perineural   20 mL - 4/18/2023 7:35:00 AM

## 2023-04-18 NOTE — ANESTHESIA POSTPROCEDURE EVALUATION
Department of Anesthesiology  Postprocedure Note    Patient: Alex Herr  MRN: [id number]  YOB: 1960  Date of evaluation: 4/18/2023      Procedure Summary     Date: 04/18/23 Room / Location: [address]    Anesthesia Start: 2224 Anesthesia Stop: 0010    Procedure: LEFT SHOULDER ARTHROSCOPY SHOULDER  ROTATOR CUFF REPAIR LATERAL, [address] [place] [address] (Left: Shoulder) Diagnosis:       Rotator cuff syndrome of left shoulder      (LEFT SHOULDER ROTATOR CUFF TEAR)    Surgeons: Tommie Mckoy MD Responsible Provider: Kelli Carreno DO    Anesthesia Type: General, Regional ASA Status: 2          Anesthesia Type: General, Regional    Rip Phase I: Rip Score: 10    Rip Phase II:        Anesthesia Post Evaluation    Patient location during evaluation: PACU  Patient participation: complete - patient cannot participate  Level of consciousness: awake and alert  Airway patency: patent  Nausea & Vomiting: no nausea and no vomiting  Complications: no  Cardiovascular status: blood pressure returned to baseline  Respiratory status: room air, spontaneous ventilation and acceptable  Hydration status: stable  Multimodal analgesia pain management approach

## 2023-05-05 ENCOUNTER — HOSPITAL ENCOUNTER (OUTPATIENT)
Dept: PHYSICAL THERAPY | Age: 63
Setting detail: THERAPIES SERIES
Discharge: HOME | End: 2023-05-05
Payer: MEDICARE

## 2023-05-05 PROCEDURE — 97162 PT EVAL MOD COMPLEX 30 MIN: CPT

## 2023-05-05 PROCEDURE — 97110 THERAPEUTIC EXERCISES: CPT

## 2023-05-05 ASSESSMENT — PAIN DESCRIPTION - PAIN TYPE: TYPE: SURGICAL PAIN

## 2023-05-05 ASSESSMENT — PAIN DESCRIPTION - ORIENTATION: ORIENTATION: LEFT

## 2023-05-05 ASSESSMENT — PAIN SCALES - GENERAL: PAINLEVEL_OUTOF10: 0

## 2023-05-05 ASSESSMENT — PAIN DESCRIPTION - DESCRIPTORS: DESCRIPTORS: SHARP

## 2023-05-05 ASSESSMENT — PAIN DESCRIPTION - LOCATION: LOCATION: SHOULDER

## 2023-05-05 NOTE — PROGRESS NOTES
[address]  PHYSICAL THERAPY PLAN OF CARE   [facility].   [facility], [address] [place] [address]         Phone: [phone]  Fax: [phone]    [] Certification  [] Recertification [x]  Plan of Care  [] Progress Note [] Discharge      Referring Provider: Lucy Brown PA-C     From:  Tila Bhardwaj, PT, DPT  Patient: Karly Weiss (70 y.o. female) : 1960 Date: 2023  Medical Diagnosis: Unspecified rotator cuff tear or rupture of unspecified shoulder, not specified as traumatic [M75.100]       Treatment Diagnosis: L shoulder PROM, decreased thoracic rotation AROM, decreased L UE strength, decreased posture, and p/o L shoulder pain    Plan of Care/Certification Expiration Date: 23   Progress Report Period from:  2023  to 2023    Visits to Date: 1 No Show: 0 Cancelled Appts: 0    OBJECTIVE:   Short Term Goals - Time Frame for Short Term Goals: 3 weeks    Goals Current/Discharge status  Status   Short Term Goal 1: The pt will demo improved L shoulder PROM ER 30, flex, abd 90* in order to increase ease with AROM once able per protocol    PROM LUE (degrees)  L Shoulder Flex  (0-180): 90  L Shoulder ABduction (0-180): 70  L Shoulder Ext Rotation  (0-90): 25  New   Short Term Goal 2: The pt will demonstrate improved postural awareness requiring <25% VC's with exercises   fair   New   Short Term Goal 3: The pt will demo improved B thoracic rotation AROM WNL's in order to increase UE biomechanics and ease with shoulder AROM as able per protocol   AROM Thoracic Spine   Thoracic spine general AROM: B rotation 75%   New     Long Term Goals - Time Frame for Long Term Goals : 10 weeks  Goals Current/ Discharge status Status   Long Term Goal 1: The pt will be indep/compliant with HEP in order to self-manage symptoms upon D/C  ongoing   New   Long Term Goal 2: The pt will have an increase in UEFI score >/=60/80 points in order to increase functional activity tolerance     New   Long
Physician  [x] Perform HEP  [x] Meds as prescribed    Evaluation and patient rights have been reviewed and patient agrees with plan of care. Yes  [x]  No  []   Explain:     Paredes Fall Risk Assessment  Risk Factor Scale  Score   History of Falls [] Yes  [x] No 25  0    Secondary Diagnosis [] Yes  [x] No 15  0    Ambulatory Aid [] Furniture  [] Crutches/cane/walker  [x] None/bedrest/wheelchair/nurse 30  15  0    IV/Heparin Lock [] Yes  [x] No 20  0    Gait/Transferring [] Impaired  [] Weak  [x] Normal/bedrest/immobile 20  10  0    Mental Status [] Forgets limitations  [x] Oriented to own ability 15  0       Total: 0     Based on the Assessment score: check the appropriate box.   [x]  No intervention needed   Low =   Score of 0-24  []  Use standard prevention interventions Moderate =  Score of 24-44   [] Discuss fall prevention strategies   [] Indicate moderate falls risk on eval  []  Use high risk prevention interventions High = Score of 45 and higher   [] Discuss fall prevention strategies   [] Provide supervision during treatment time    Minutes:  PT Individual Minutes  Time In: 1300  Time Out: 1334  Minutes: 34  Timed Code Treatment Minutes: 15 Minutes  Procedure Minutes: 19 eval     Timed Activity Minutes Units   Ther Ex 15 1     Electronically signed by Morro Cui PT on 5/5/23 at 1:42 PM EDT

## 2023-05-12 ENCOUNTER — HOSPITAL ENCOUNTER (OUTPATIENT)
Dept: PHYSICAL THERAPY | Age: 63
Setting detail: THERAPIES SERIES
Discharge: HOME | End: 2023-05-12
Payer: MEDICARE

## 2023-05-12 PROCEDURE — 97110 THERAPEUTIC EXERCISES: CPT

## 2023-05-12 NOTE — PROGRESS NOTES
Plan:  Frequency/Duration:  Plan  Plan Frequency: 1-3  Plan weeks: 10  Current Treatment Recommendations: Strengthening, ROM, Neuromuscular re-education, Manual, Pain management, Home exercise program, Patient/Caregiver education & training, Modalities, Positioning  Modalities: Heat/Cold, Ultrasound, E-stim - unattended  Additional Comments: 1x next week and then increase to 2x starting 5/15/23  Pt to continue current HEP. See objective section for any therapeutic exercise changes, additions or modifications this date.     Therapy Time:      PT Individual Minutes  Time In: 1010  Time Out: 0753  Minutes: 45  Timed Code Treatment Minutes: 45 Minutes  Procedure Minutes: 0  Timed Activity Minutes Units   Ther Ex  45  3     Electronically signed by Edie Grossman PTA on 5/12/23 at 11:22 AM EDT

## 2023-05-15 ENCOUNTER — HOSPITAL ENCOUNTER (OUTPATIENT)
Dept: PHYSICAL THERAPY | Age: 63
Setting detail: THERAPIES SERIES
Discharge: HOME | End: 2023-05-15
Payer: MEDICARE

## 2023-05-15 PROCEDURE — 97110 THERAPEUTIC EXERCISES: CPT

## 2023-05-15 NOTE — PROGRESS NOTES
[facility]  Outpatient Physical Therapy    Treatment Note        Date: 5/15/2023  Patient: Americo Olivia  : 1960   Confirmed: Yes  MRN: [id number]  Referring Provider: Soledad Rojas PA-C    Medical Diagnosis: Unspecified rotator cuff tear or rupture of unspecified shoulder, not specified as traumatic [M75.100]       Treatment Diagnosis: L shoulder PROM, decreased thoracic rotation AROM, decreased L UE strength, decreased posture, and p/o L shoulder pain    Visit Information:  Insurance: Payor: Sosa Vazquez / Plan: Dejuan Gregg / Product Type: *No Product type* /   PT Visit Information  Onset Date: 23  Total # of Visits to Date: 3  Plan of Care/Certification Expiration Date: 23  No Show: 0  Progress Note Due Date: 23  Canceled Appointment: 0  Progress Note Counter: 3/10 (PN due 23)    Subjective Information:  Subjective: I see the physician on wednesday and my shoulder is doing pretty well. HEP Compliance:  [x] Good [] Fair [] Poor [] Reports not doing due to:    Pain Screening  Patient Currently in Pain: Denies    Treatment:  Exercises:  Exercises  Exercise 1: PROM: L shoulder </=30/90/90 x15 mins  Exercise 2: pendulums reviewed for HEP fwd, lateral, circles  Exercise 3: wand ER <30* PROM x12 supine  Exercise 4: table slides flex/abd </=90 x12 x 5\" holds PROM only limited range  Exercise 5: scap retract x 15, elbow/wrist AROM-already completing for HEP  Exercise 9: UT stretch 30\" x3  levator stretch 30\" x3  Exercise 20: HEP: continue pendulums, elbow AROM, scap retract, provided wand ER </=30*        *Indicates exercise, modality, or manual techniques to be initiated when appropriate    Objective Measures:           Assessment:    Body Structures, Functions, Activity Limitations Requiring Skilled Therapeutic Intervention: Decreased ADL status, Decreased ROM, Decreased strength, Decreased posture, Increased pain, Decreased high-level

## 2023-05-19 ENCOUNTER — HOSPITAL ENCOUNTER (OUTPATIENT)
Dept: PHYSICAL THERAPY | Age: 63
Setting detail: THERAPIES SERIES
Discharge: HOME | End: 2023-05-19
Payer: MEDICARE

## 2023-05-19 PROCEDURE — 97110 THERAPEUTIC EXERCISES: CPT

## 2023-05-19 NOTE — PROGRESS NOTES
Decreased strength, Decreased posture, Increased pain, Decreased high-level IADLs  Assessment: Guided patient through exercise progressions for left shoulder P/AAROM. Noted moderate muscle guarding during exercises and PROM. Full PROM measurements taken this date. Patient most limited with ER. Reviewed additional exercises for HEP. Patient verbalizes good understanding. Dicussed taking OTC pain medication as needed prior to therapy appts to improve tolerance. Treatment Diagnosis: L shoulder PROM, decreased thoracic rotation AROM, decreased L UE strength, decreased posture, and p/o L shoulder pain  Therapy Prognosis: Good    Post-Pain Assessment:       Pain Rating (0-10 pain scale):   0/10   Location and pain description same as pre-treatment unless indicated.    Action: [] NA   [] Perform HEP  [] Meds as prescribed  [] Modalities as prescribed   [] Call Physician     GOALS   Patient Goal(s): Patient Goals : \"Regain use of arm-pain free\"    Short Term Goals Completed by 3 weeks Goal Status   STG 1 The pt will demo improved L shoulder PROM ER 30, flex, abd 90* in order to increase ease with AROM once able per protocol In progress   STG 2 The pt will demonstrate improved postural awareness requiring <25% VC's with exercises In progress   STG 3 The pt will demo improved B thoracic rotation AROM WNL's in order to increase UE biomechanics and ease with shoulder AROM as able per protocol In progress       Long Term Goals Completed by 10 weeks Goal Status   LTG 1 The pt will be indep/compliant with HEP in order to self-manage symptoms upon D/C In progress   LTG 2 The pt will have an increase in UEFI score >/=60/80 points in order to increase functional activity tolerance In progress   LTG 3 The pt will demonstrate improved L UE strength >/=4+/5 in order to lift/carry with decreased pain (as protocol allows) In progress   LTG 4 The pt will demo improved L shoulder AROM flex/abd >/=160*, ER/IR >/=70* in order to increase

## 2023-05-22 ENCOUNTER — HOSPITAL ENCOUNTER (OUTPATIENT)
Dept: PHYSICAL THERAPY | Age: 63
Setting detail: THERAPIES SERIES
Discharge: HOME | End: 2023-05-22
Payer: MEDICARE

## 2023-05-22 PROCEDURE — 97110 THERAPEUTIC EXERCISES: CPT

## 2023-05-22 NOTE — PROGRESS NOTES
[address] [facility]  Outpatient Physical Therapy    Treatment Note        Date: 2023  Patient: Nohemy Kincaid  : 1960   Confirmed: Yes  MRN: [id number]  Referring Provider: Selena Benítez PA-C    Medical Diagnosis: Unspecified rotator cuff tear or rupture of unspecified shoulder, not specified as traumatic [M75.100]       Treatment Diagnosis: L shoulder PROM, decreased thoracic rotation AROM, decreased L UE strength, decreased posture, and p/o L shoulder pain    Visit Information:  Insurance: Payor: Jose Carlos Sanford / Plan: Conchita Govea / Product Type: *No Product type* /   PT Visit Information  Onset Date: 23  Total # of Visits to Date: 5  Plan of Care/Certification Expiration Date: 23  No Show: 0  Progress Note Due Date: 23  Canceled Appointment: 0  Progress Note Counter: 5/10 (PN due 23)    Subjective Information:  Subjective: Pt reports the shoulder has been sore w/ increased movement. Pt reports trying to slowly wean from sling at home. HEP Compliance:  [x] Good [] Fair [] Poor [] Reports not doing due to:    Pain Screening  Patient Currently in Pain: Denies    Treatment:  Exercises:  Exercises  Exercise 1: PROM: L shoulder- gentle all planes 10 minutes  Exercise 2: Pulleys 4 minutes flexion  Exercise 3: Wand exercises: supine chest press, flexion, ER 5\"x10; seated/standing: ER, abd, ext, IR 5''x10 flex*  Exercise 4: table slides flex/abd 10x 5\" holds - trial flex wall slide NV*  Exercise 5: finger ladder flexion, scaption x4 ea  Exercise 6: submax chetan*  Exercise 20: HEP seated and standing wand       *Indicates exercise, modality, or manual techniques to be initiated when appropriate    Objective Measures:     LTG 4 Current Status[de-identified] 23 standing AROM L shoulder flex 120 abd 99 IR L1 ER T1      Assessment:    Body Structures, Functions, Activity Limitations Requiring Skilled Therapeutic Intervention: Decreased ADL status, Decreased ROM,

## 2023-05-26 ENCOUNTER — HOSPITAL ENCOUNTER (OUTPATIENT)
Dept: PHYSICAL THERAPY | Age: 63
Setting detail: THERAPIES SERIES
Discharge: HOME | End: 2023-05-26
Payer: MEDICARE

## 2023-05-26 PROCEDURE — 97110 THERAPEUTIC EXERCISES: CPT

## 2023-05-26 ASSESSMENT — PAIN DESCRIPTION - DESCRIPTORS: DESCRIPTORS: ACHING

## 2023-05-26 ASSESSMENT — PAIN SCALES - GENERAL: PAINLEVEL_OUTOF10: 1

## 2023-05-26 ASSESSMENT — PAIN DESCRIPTION - LOCATION: LOCATION: SHOULDER

## 2023-05-26 ASSESSMENT — PAIN DESCRIPTION - PAIN TYPE: TYPE: SURGICAL PAIN

## 2023-05-26 ASSESSMENT — PAIN DESCRIPTION - ORIENTATION: ORIENTATION: LEFT

## 2023-05-26 NOTE — PROGRESS NOTES
[address] [facility]  Outpatient Physical Therapy    Treatment Note        Date: 2023  Patient: Tegan Puente  : 1960   Confirmed: Yes  MRN: [id number]  Referring Provider: Lia Lopez PA-C    Medical Diagnosis: Unspecified rotator cuff tear or rupture of unspecified shoulder, not specified as traumatic [M75.100]       Treatment Diagnosis: L shoulder PROM, decreased thoracic rotation AROM, decreased L UE strength, decreased posture, and p/o L shoulder pain    Visit Information:  Insurance: Payor: Ekaterina Chan / Plan: Rosalba  / Product Type: *No Product type* /   PT Visit Information  Onset Date: 23  Total # of Visits to Date: 6  Plan of Care/Certification Expiration Date: 23  No Show: 0  Progress Note Due Date: 23  Canceled Appointment: 0  Progress Note Counter: 6/10 (PN due 23)    Subjective Information:  Subjective: Pt reports she stopped using the sling completely w/o issue. Pt states she is noticing improvements and beginning to utilize her L UE w/ ADL's. Pt reports \"it gets a little achy, but not bad. \"  HEP Compliance:  [x] Good [] Fair [] Poor [] Reports not doing due to:    Pain Screening  Patient Currently in Pain: Yes  Pain Assessment: 0-10  Pain Level: 1  Pain Type: Surgical pain  Pain Location: Shoulder  Pain Orientation: Left  Pain Descriptors: Aching    Treatment:  Exercises:  Exercises  Exercise 1: PROM: L shoulder- all planes 5 minutes  Exercise 2: Pulleys 2 minutes flexion; 2 minutes scaption  Exercise 3: Wand exercises seated/standing: flex (back against wall to dec compensation), ER, abd, ext, IR 5''x10  Exercise 4: wall slide flex, scap 5''x10 ea  Exercise 5: finger ladder flexion, scaption x5 ea  Exercise 6: submax chetan flex, abd, ext, IR, ER 5''x5 ea  Exercise 7: ER stretch seated at table 5-10''x10; ER stretch at doorway (demo'd for HEP)  Exercise 20: HEP: standing flex w/ wand, wall slides, submax chetan, ER

## 2023-05-31 ENCOUNTER — HOSPITAL ENCOUNTER (OUTPATIENT)
Dept: PHYSICAL THERAPY | Age: 63
Setting detail: THERAPIES SERIES
Discharge: HOME | End: 2023-05-31
Payer: MEDICARE

## 2023-05-31 PROCEDURE — 97110 THERAPEUTIC EXERCISES: CPT

## 2023-05-31 NOTE — PROGRESS NOTES
functional activity tolerance In progress   LTG 3 The pt will demonstrate improved L UE strength >/=4+/5 in order to lift/carry with decreased pain (as protocol allows) In progress   LTG 4 The pt will demo improved L shoulder AROM flex/abd >/=160*, ER/IR >/=70* in order to increase ease with ADL's (as protocol allows) In progress          Plan:  Frequency/Duration:  Plan  Plan Frequency: 1-3  Plan weeks: 10  Current Treatment Recommendations: Strengthening, ROM, Neuromuscular re-education, Manual, Pain management, Home exercise program, Patient/Caregiver education & training, Modalities, Positioning  Modalities: Heat/Cold, Ultrasound, E-stim - unattended  Pt to continue current HEP. See objective section for any therapeutic exercise changes, additions or modifications this date.     Therapy Time:      PT Individual Minutes  Time In: 9080  Time Out: 4772  Minutes: 40  Timed Code Treatment Minutes: 40 Minutes  Procedure Minutes: 0    Timed Activity Minutes Units   Ther Ex 40 3     Electronically signed by Alba Gaston PTA on 5/31/23 at 12:54 PM EDT

## 2023-06-02 ENCOUNTER — HOSPITAL ENCOUNTER (OUTPATIENT)
Dept: PHYSICAL THERAPY | Age: 63
Setting detail: THERAPIES SERIES
Discharge: HOME | End: 2023-06-02
Payer: MEDICARE

## 2023-06-02 PROCEDURE — 97110 THERAPEUTIC EXERCISES: CPT

## 2023-06-02 PROCEDURE — 97140 MANUAL THERAPY 1/> REGIONS: CPT

## 2023-06-02 NOTE — PROGRESS NOTES
[address] [facility]  Outpatient Physical Therapy    Treatment Note        Date: 2023  Patient: Gabriella Gomez  : 1960   Confirmed: Yes  MRN: [id number]  Referring Provider: Vinny Wright PA-C    Medical Diagnosis: Unspecified rotator cuff tear or rupture of unspecified shoulder, not specified as traumatic [M75.100]       Treatment Diagnosis: L shoulder PROM, decreased thoracic rotation AROM, decreased L UE strength, decreased posture, and p/o L shoulder pain    Visit Information:  Insurance: Payor: Klever Gentile / Plan: Ora Friendly / Product Type: *No Product type* /   PT Visit Information  Onset Date: 23  Total # of Visits to Date: 8  Plan of Care/Certification Expiration Date: 23  No Show: 0  Progress Note Due Date: 23  Canceled Appointment: 0  Progress Note Counter: 8/10 (PN due 23)    Subjective Information:  Subjective: Pt reports she was really sore following Wednesday's session and had increased edema in L shoulder. Pt used anti-inflammatories and CP w/ relief. Pt reports she did have to catch her cousin from falling yesterday, but states it was tolerable.   HEP Compliance:  [x] Good [] Fair [] Poor [] Reports not doing due to:    Pain Screening  Patient Currently in Pain: Denies    Treatment:  Exercises:  Exercises  Exercise 1: PROM: L shoulder- flex < IR, ER 10 minutes  Exercise 2: Pulleys 2 minutes flexion; 2 minutes scaption  Exercise 4: wall slide flex, scap 5''x10 ea  Exercise 5: finger ladder flexion, scaption x5 ea  Exercise 7: ER stretch ER stretch at doorway 10''x5  Exercise 11: gentle IR stretch w/ strap 10''x5  Exercise 20: HEP  gentle IR stretch     Manual:   Manual Therapy  Joint Mobilization: gentle ant-post GHJ mobs grades ll x5 mins       *Indicates exercise, modality, or manual techniques to be initiated when appropriate    Objective Measures: x5 mins    STG 1 Current Status[de-identified] 23 pt previously met goal  STG 2
by Sylvia Carpenter PTA on 6/2/23 at 2:02 PM EDT    If you have any questions or concerns, please don't hesitate to call. Thank you for your referral.    I have reviewed this plan of care and certify a need for medically necessary rehabilitation services.     Physician Signature:__________________________________________________________  Date:  Please sign and return

## 2023-06-05 ENCOUNTER — HOSPITAL ENCOUNTER (OUTPATIENT)
Dept: PHYSICAL THERAPY | Age: 63
Setting detail: THERAPIES SERIES
Discharge: HOME | End: 2023-06-05
Payer: MEDICARE

## 2023-06-05 PROCEDURE — 97110 THERAPEUTIC EXERCISES: CPT

## 2023-06-05 NOTE — PROGRESS NOTES
[address] [facility]  Outpatient Physical Therapy    Treatment Note        Date: 2023  Patient: Jere James  : 1960   Confirmed: Yes  MRN: [id number]  Referring Provider: Poncho Odonnell PA-C    Medical Diagnosis: Unspecified rotator cuff tear or rupture of unspecified shoulder, not specified as traumatic [M75.100]       Treatment Diagnosis: L shoulder PROM, decreased thoracic rotation AROM, decreased L UE strength, decreased posture, and p/o L shoulder pain    Visit Information:  Insurance: Payor: Zulay Talento al Aula / Plan: Peter Eller / Product Type: *No Product type* /   PT Visit Information  Onset Date: 23  Total # of Visits to Date: 9  Plan of Care/Certification Expiration Date: 23  No Show: 0  Progress Note Due Date: 23  Canceled Appointment: 0  Progress Note Counter:  (PN due 23)    Subjective Information:  Subjective: Pt reports \"I'm doing better than Friday, I'm not swollen. \"  HEP Compliance:  [x] Good [] Fair [] Poor [] Reports not doing due to:    Pain Screening  Patient Currently in Pain: Denies    Treatment:  Exercises:  Exercises  Exercise 2: Pulleys 2 minutes flexion; 2 minutes scaption; consider trying gentle IR*  Exercise 4: wall slide flex, scap 5''x10 ea  Exercise 5: finger ladder flexion, scaption x5 ea  Exercise 7: ER stretch ER stretch at doorway 10''x5  Exercise 8: rows, lats, IR, ER RTB x10 each  Exercise 9: standing flex, abd to 90 w/o wt x10  Exercise 10: prone or bent over scap series*  Exercise 12: S/L ER/abd 5'' x10  Exercise 13: sleeper stretch 20-30''x3  Exercise 20: HEP: S/L abd/ER, sleeper stretch  Treatment Reasoning  Limitations addressed: Mobility    *Indicates exercise, modality, or manual techniques to be initiated when appropriate    Objective Measures:       Assessment:    Body Structures, Functions, Activity Limitations Requiring Skilled Therapeutic Intervention: Decreased ADL status, Decreased ROM,

## 2023-06-09 ENCOUNTER — HOSPITAL ENCOUNTER (OUTPATIENT)
Dept: PHYSICAL THERAPY | Age: 63
Setting detail: THERAPIES SERIES
Discharge: HOME | End: 2023-06-09
Payer: MEDICARE

## 2023-06-09 PROCEDURE — 97110 THERAPEUTIC EXERCISES: CPT

## 2023-06-09 NOTE — PROGRESS NOTES
[address] [facility]  Outpatient Physical Therapy    Treatment Note        Date: 2023  Patient: Jason Briseno  : 1960   Confirmed: Yes  MRN: [id number]  Referring Provider: Angelina Akers PA-C    Medical Diagnosis: Unspecified rotator cuff tear or rupture of unspecified shoulder, not specified as traumatic [M75.100]       Treatment Diagnosis: L shoulder PROM, decreased thoracic rotation AROM, decreased L UE strength, decreased posture, and p/o L shoulder pain    Visit Information:  Insurance: Payor: Car Verma / Plan: Rosanna Salts / Product Type: *No Product type* /   PT Visit Information  Onset Date: 23  Total # of Visits to Date: 10  Plan of Care/Certification Expiration Date: 23  No Show: 0  Progress Note Due Date: 23  Canceled Appointment: 0  Progress Note Counter: 10/18 (PN due 23)    Subjective Information:  Subjective: Pt reports \"I'm doing good, I've been splitting up my exercises so I'm not as sore. \" Pt reports less \"pinching\" lately. HEP Compliance:  [x] Good [] Fair [] Poor [] Reports not doing due to:    Pain Screening  Patient Currently in Pain: Denies    Treatment:  Exercises:  Exercises  Exercise 2: Pulleys 2 minutes flexion; 2 minutes scaption; gentle IR x1 min  Exercise 4: wall slide flex, scap 5''x10 ea  Exercise 5: finger ladder flexion, scaption x5 ea  Exercise 8: rows, lats, IR, ER RTB x10 each  Exercise 9: standing flex, abd to 90 w/o wt x10  Exercise 10: bent over scap series supported on table rows, ext, horizontal abd (neutral) x10ea  Exercise 12: S/L ER/abd 5'' x10  Exercise 13: sleeper stretch 20-30''x3  Exercise 14: ball stabs at wall 4-way x10ea  Exercise 20: HEP: cont current  Treatment Reasoning  Limitations addressed: Mobility    *Indicates exercise, modality, or manual techniques to be initiated when appropriate    Objective Measures:       Assessment:    Body Structures, Functions, Activity Limitations

## 2023-06-14 ENCOUNTER — OFFICE VISIT (OUTPATIENT)
Dept: PRIMARY CARE | Facility: CLINIC | Age: 63
End: 2023-06-14
Payer: MEDICARE

## 2023-06-14 VITALS
DIASTOLIC BLOOD PRESSURE: 84 MMHG | SYSTOLIC BLOOD PRESSURE: 146 MMHG | HEART RATE: 80 BPM | OXYGEN SATURATION: 98 % | TEMPERATURE: 97.1 F | WEIGHT: 210 LBS | BODY MASS INDEX: 31.1 KG/M2 | HEIGHT: 69 IN | RESPIRATION RATE: 16 BRPM

## 2023-06-14 DIAGNOSIS — F41.9 ANXIETY DISORDER, UNSPECIFIED TYPE: ICD-10-CM

## 2023-06-14 DIAGNOSIS — M25.50 ARTHRALGIA, UNSPECIFIED JOINT: ICD-10-CM

## 2023-06-14 DIAGNOSIS — R73.9 HYPERGLYCEMIA: ICD-10-CM

## 2023-06-14 DIAGNOSIS — Z00.00 ENCOUNTER FOR MEDICARE ANNUAL WELLNESS EXAM: ICD-10-CM

## 2023-06-14 DIAGNOSIS — J30.2 SEASONAL ALLERGIES: ICD-10-CM

## 2023-06-14 DIAGNOSIS — E78.2 MIXED HYPERLIPIDEMIA: ICD-10-CM

## 2023-06-14 PROBLEM — R19.7 DIARRHEA: Status: RESOLVED | Noted: 2023-03-24 | Resolved: 2023-06-14

## 2023-06-14 LAB — POC HEMOGLOBIN A1C: 5.4 % (ref 4.2–6.5)

## 2023-06-14 PROCEDURE — 99213 OFFICE O/P EST LOW 20 MIN: CPT | Performed by: FAMILY MEDICINE

## 2023-06-14 PROCEDURE — 1170F FXNL STATUS ASSESSED: CPT | Performed by: FAMILY MEDICINE

## 2023-06-14 PROCEDURE — 3008F BODY MASS INDEX DOCD: CPT | Performed by: FAMILY MEDICINE

## 2023-06-14 PROCEDURE — 83036 HEMOGLOBIN GLYCOSYLATED A1C: CPT | Performed by: FAMILY MEDICINE

## 2023-06-14 PROCEDURE — 1160F RVW MEDS BY RX/DR IN RCRD: CPT | Performed by: FAMILY MEDICINE

## 2023-06-14 PROCEDURE — G0439 PPPS, SUBSEQ VISIT: HCPCS | Performed by: FAMILY MEDICINE

## 2023-06-14 PROCEDURE — 1036F TOBACCO NON-USER: CPT | Performed by: FAMILY MEDICINE

## 2023-06-14 PROCEDURE — 1159F MED LIST DOCD IN RCRD: CPT | Performed by: FAMILY MEDICINE

## 2023-06-14 RX ORDER — MELOXICAM 15 MG/1
15 TABLET ORAL DAILY
Qty: 90 TABLET | Refills: 1 | Status: SHIPPED | OUTPATIENT
Start: 2023-06-14 | End: 2024-01-09 | Stop reason: ALTCHOICE

## 2023-06-14 ASSESSMENT — ACTIVITIES OF DAILY LIVING (ADL)
MANAGING_FINANCES: INDEPENDENT
TAKING_MEDICATION: INDEPENDENT
DRESSING: INDEPENDENT
BATHING: INDEPENDENT
DOING_HOUSEWORK: INDEPENDENT
GROCERY_SHOPPING: INDEPENDENT

## 2023-06-14 ASSESSMENT — ENCOUNTER SYMPTOMS
LOSS OF SENSATION IN FEET: 0
OCCASIONAL FEELINGS OF UNSTEADINESS: 0
DEPRESSION: 0

## 2023-06-14 ASSESSMENT — PATIENT HEALTH QUESTIONNAIRE - PHQ9
SUM OF ALL RESPONSES TO PHQ9 QUESTIONS 1 AND 2: 0
1. LITTLE INTEREST OR PLEASURE IN DOING THINGS: NOT AT ALL
2. FEELING DOWN, DEPRESSED OR HOPELESS: NOT AT ALL

## 2023-06-14 NOTE — PROGRESS NOTES
Subjective   Reason for Visit: Vandana Cramer is a 63 y.o. female here for a Medicare Wellness visit.   Covid vax: x 3  CRC: 2017  Mammogram: 2/2022-ordered  Pap: 12/2020  Lmp: ablation  CHECKLIST REVIEWED AND COMPLETE FOR AMW  L shoulder getting better    Past Medical, Surgical, and Family History reviewed and updated in chart.    Reviewed all medications by prescribing practitioner or clinical pharmacist (such as prescriptions, OTCs, herbal therapies and supplements) and documented in the medical record.  Medicare Annual Wellness Visit Subsequent, Hyperglycemia, Hyperlipidemia, and Anxiety  HPI    Patient Self Assessment of Health Status  Patient Self Assessment: Good    Nutrition and Exercise  Current Diet: HEALTHY Diet always best, minimizing excess carbs  Adequate Fluid Intake: Yes  Caffeine: -aware to minimize intake  Exercise Frequency: Regularly advised    Functional Ability/Level of Safety  Cognitive Impairment Observed: No cognitive impairment observed    Home Safety Risk Factors: None    Patient Care Team:  Jennifer Agudelo MD as PCP - General    HPI  Patient Active Problem List   Diagnosis    Allergic rhinitis    Colon polyps    Arthralgia    Anxiety disorder    Hyperglycemia    Mixed hyperlipidemia    Seasonal allergies    Shoulder impingement    Shoulder pain, bilateral    Shoulder weakness    Tinnitus    Class 1 obesity with body mass index (BMI) of 30.0 to 30.9 in adult    Other unilateral secondary osteoarthritis of knee      Past Surgical History:   Procedure Laterality Date    COLONOSCOPY W/ POLYPECTOMY  2017    1 polyp    ENDOMETRIAL ABLATION  2009    SHOULDER SURGERY Left 2023    bone chip, spur, tear     Sent to gi last visit for scope  Mamm also overdue    Review of Systems no sz mi cad nor cva    This patient has   NO history of recent Covid nor flu symptoms,  NO Fever nor chills,  NO Chest pain, shortness of breath nor paroxysmal nocturnal dyspnea,  NO Nausea, vomiting, nor  "diarrhea,  NO Hematochezia nor melena,  NO Dysuria, hematuria, nor new incontinence issues  NO new severe headaches nor neurological complaints,  NO new issues with anxiety nor depression nor new psychiatric complaints,  NO suicidal nor homicidal ideations.     OBJECTIVE:  /84   Pulse 80   Temp 36.2 °C (97.1 °F) (Temporal)   Resp 16   Ht 1.753 m (5' 9\")   Wt 95.3 kg (210 lb)   LMP  (LMP Unknown)   SpO2 98%   BMI 31.01 kg/m²      General:  alert, oriented, no acute distress.  No obvious skin rashes noted.   No gait disturbance noted.    Mood is pleasant, not tearful, no signs of emotional distress.  Not appearing intoxicated or altered.   No voiced delusions,   Normal, appropriate behavior.    HEENT: Normocephalic, atraumatic,   Pupils round, reactive to light  Extraocular motions intact and wnl  Tympanic membranes normal    Neck: no nuchal rigidity  No masses palpable.  No carotid bruits.  No thyromegaly.    Respiratory: Equal breath sounds  No wheezes,    rales,    nor rhonchi  No respiratory distress.    Heart: Regular rate and rhythm, no    murmurs  no rubs/gallops    Abdomen: no masses palpable, no rebound nor guarding, no rebound nor guarding.    Extremities: NO cyanosis noted, no clubbing.   No edema noted.  2+dorsalis pedis pulses.    Normal-not antalgic, steady gait.    No visits with results within 3 Month(s) from this visit.   Latest known visit with results is:   Legacy Encounter on 01/31/2023   Component Date Value Ref Range Status    Hemoglobin A1C 01/31/2023 5.6  % Final    Comment:      Diagnosis of Diabetes-Adults   Non-Diabetic: < or = 5.6%   Increased risk for developing diabetes: 5.7-6.4%   Diagnostic of diabetes: > or = 6.5%  .       Monitoring of Diabetes                Age (y)     Therapeutic Goal (%)   Adults:          >18           <7.0   Pediatrics:    13-18           <7.5                   7-12           <8.0                   0- 6            7.5-8.5   American Diabetes " Association. Diabetes Care 33(S1), Jan 2010.      Estimated Average Glucose 01/31/2023 114  MG/DL Final    Glucose 01/31/2023 112 (H)  74 - 99 mg/dL Final    Sodium 01/31/2023 140  136 - 145 mmol/L Final    Potassium 01/31/2023 4.0  3.5 - 5.3 mmol/L Final    Chloride 01/31/2023 104  98 - 107 mmol/L Final    Bicarbonate 01/31/2023 27  21 - 32 mmol/L Final    Anion Gap 01/31/2023 13  10 - 20 mmol/L Final    Urea Nitrogen 01/31/2023 20  6 - 23 mg/dL Final    Creatinine 01/31/2023 1.00  0.50 - 1.05 mg/dL Final    GFR Female 01/31/2023 63  >90 mL/min/1.73m2 Final    Comment:  CALCULATIONS OF ESTIMATED GFR ARE PERFORMED   USING THE 2021 CKD-EPI STUDY REFIT EQUATION   WITHOUT THE RACE VARIABLE FOR THE IDMS-TRACEABLE   CREATININE METHODS.    https://jasn.asnjournals.org/content/early/2021/09/22/ASN.5894658712      Calcium 01/31/2023 10.5 (H)  8.6 - 10.3 mg/dL Final    Albumin 01/31/2023 4.2  3.4 - 5.0 g/dL Final    Alkaline Phosphatase 01/31/2023 53  33 - 136 U/L Final    Total Protein 01/31/2023 7.3  6.4 - 8.2 g/dL Final    AST 01/31/2023 17  9 - 39 U/L Final    Total Bilirubin 01/31/2023 0.5  0.0 - 1.2 mg/dL Final    ALT (SGPT) 01/31/2023 11  7 - 45 U/L Final    Comment:  Patients treated with Sulfasalazine may generate    falsely decreased results for ALT.      WBC 01/31/2023 5.8  4.4 - 11.3 x10E9/L Final    RBC 01/31/2023 4.46  4.00 - 5.20 x10E12/L Final    Hemoglobin 01/31/2023 13.4  12.0 - 16.0 g/dL Final    Hematocrit 01/31/2023 41.0  36.0 - 46.0 % Final    MCV 01/31/2023 92  80 - 100 fL Final    MCHC 01/31/2023 32.7  32.0 - 36.0 g/dL Final    Platelets 01/31/2023 281  150 - 450 x10E9/L Final    RDW 01/31/2023 14.0  11.5 - 14.5 % Final    Cholesterol 01/31/2023 218 (H)  0 - 199 mg/dL Final    Comment: .      AGE      DESIRABLE   BORDERLINE HIGH   HIGH     0-19 Y     0 - 169       170 - 199     >/= 200    20-24 Y     0 - 189       190 - 224     >/= 225         >24 Y     0 - 199       200 - 239     >/= 240   **All  ranges are based on fasting samples. Specific   therapeutic targets will vary based on patient-specific   cardiac risk.  .   Pediatric guidelines reference:Pediatrics 2011, 128(S5).   Adult guidelines reference: NCEP ATPIII Guidelines,     SCOTT 2001, 258:2486-97  .   Venipuncture immediately after or during the    administration of Metamizole may lead to falsely   low results. Testing should be performed immediately   prior to Metamizole dosing.      HDL 01/31/2023 65.5  mg/dL Final    Comment: .      AGE      VERY LOW   LOW     NORMAL    HIGH       0-19 Y       < 35   < 40     40-45     ----    20-24 Y       ----   < 40       >45     ----      >24 Y       ----   < 40     40-60      >60  .      Cholesterol/HDL Ratio 01/31/2023 3.3   Final    Comment: REF VALUES  DESIRABLE  < 3.4  HIGH RISK  > 5.0      LDL 01/31/2023 129 (H)  0 - 99 mg/dL Final    Comment: .                           NEAR      BORD      AGE      DESIRABLE  OPTIMAL    HIGH     HIGH     VERY HIGH     0-19 Y     0 - 109     ---    110-129   >/= 130     ----    20-24 Y     0 - 119     ---    120-159   >/= 160     ----      >24 Y     0 -  99   100-129  130-159   160-189     >/=190  .      VLDL 01/31/2023 24  0 - 40 mg/dL Final    Triglycerides 01/31/2023 118  0 - 149 mg/dL Final    Comment: .      AGE      DESIRABLE   BORDERLINE HIGH   HIGH     VERY HIGH   0 D-90 D    19 - 174         ----         ----        ----  91 D- 9 Y     0 -  74        75 -  99     >/= 100      ----    10-19 Y     0 -  89        90 - 129     >/= 130      ----    20-24 Y     0 - 114       115 - 149     >/= 150      ----         >24 Y     0 - 149       150 - 199    200- 499    >/= 500  .   Venipuncture immediately after or during the    administration of Metamizole may lead to falsely   low results. Testing should be performed immediately   prior to Metamizole dosing.          Assessment/Plan     Problem List Items Addressed This Visit       Arthralgia    Relevant Medications     meloxicam (Mobic) 15 mg tablet    Anxiety disorder    Hyperglycemia    Relevant Orders    POCT glycosylated hemoglobin (Hb A1C) manually resulted    Mixed hyperlipidemia    Seasonal allergies     Other Visit Diagnoses       BMI 31.0-31.9,adult    -  Primary    Encounter for Medicare annual wellness exam              Advance Care Planning Note   Discussion Date: 06/14/23   Discussion Participants: patient    The patient wishes to discuss Advance Care Planning today and the following is a brief summary of our discussion.     Patient has capacity to make their own medical decisions: Yes  Health Care Agent/Surrogate Decision Maker documented in chart: Yes  Documents on file and valid:  Advance Directive/Living Will: Yes   Health Care Power of : Yes  Communication of Medical Status/Prognosis:   yes   Communication of Treatment Goals/Options:   yes  Treatment Decisions  yes  Time Statement: Total face to face time spent on advance care planning was <30 minutes with <30 minutes spent in counseling, including the explanation.    SEE ME AT NEXT REGULARLY SCHEDULED VISIT-sooner if condition deteriorates or new problems arise.    Mamm and scope needed  Full code desired  No depression  No dementia  Hba1c 5.4 was 5.6  Ldl 129    Will start checking bp at home  More aerobic exercise needed  Dash diet

## 2023-06-20 ENCOUNTER — HOSPITAL ENCOUNTER (OUTPATIENT)
Dept: PHYSICAL THERAPY | Age: 63
Setting detail: THERAPIES SERIES
Discharge: HOME | End: 2023-06-20
Payer: MEDICARE

## 2023-06-20 PROCEDURE — 97110 THERAPEUTIC EXERCISES: CPT

## 2023-06-20 NOTE — PROGRESS NOTES
[facility]  Outpatient Physical Therapy    Treatment Note        Date: 2023  Patient: Toshia Robles  : 1960   Confirmed: Yes  MRN: [id number]  Referring Provider: Lena Darden PA-C    Medical Diagnosis: Unspecified rotator cuff tear or rupture of unspecified shoulder, not specified as traumatic [M75.100]       Treatment Diagnosis: L shoulder PROM, decreased thoracic rotation AROM, decreased L UE strength, decreased posture, and p/o L shoulder pain    Visit Information:  Insurance: Payor: Wilfredo Boylionel / Plan: Milagro Weinberg / Product Type: *No Product type* /   PT Visit Information  Onset Date: 23  Total # of Visits to Date: 13  Plan of Care/Certification Expiration Date: 23  No Show: 0  Progress Note Due Date: 23  Canceled Appointment: 0  Progress Note Counter:  (PN due 23)    Subjective Information:  Subjective: Pt reports \"I feel good. \" Pt reports soreness following last visit, but states \"it was tolerable. \" Pt reports she recently stopped taking her meloxicam d/t high BP and is now taking Tylenol. HEP Compliance:  [x] Good [] Fair [] Poor [] Reports not doing due to:    Pain Screening  Patient Currently in Pain: Denies    Treatment:  Exercises:  Exercises  Exercise 3: UBE L1.5 2'F/2'R  Exercise 4: lifting (10) 1-2# objects from waist level to 68'' and back x2  Exercise 8: rows, lats, IR, ER GTB x12 each  Exercise 9: standing flex, abd to 90 w/ 1# x10  Exercise 10: bent over scap series supported on table rows 2#, ext 2#, horizontal abd (neutral) 1# x15ea  Exercise 12: S/L ER/abd/SA punch 5'' x12 w/ 1# wt  Exercise 16: reaching at number wall 0.5kg ball x3  Exercise 17: valpar 9 panel 1 <-> panel 2 square x1 LUE only  Exercise 20: HEP: GTB for progressions  Treatment Reasoning  Limitations addressed:  Mobility, Strength    *Indicates exercise, modality, or manual techniques to be initiated when appropriate    Objective

## 2023-06-23 ENCOUNTER — HOSPITAL ENCOUNTER (OUTPATIENT)
Dept: PHYSICAL THERAPY | Age: 63
Setting detail: THERAPIES SERIES
Discharge: HOME | End: 2023-06-23
Payer: MEDICARE

## 2023-06-23 PROCEDURE — 97110 THERAPEUTIC EXERCISES: CPT

## 2023-06-23 NOTE — PROGRESS NOTES
[address] [facility]  Outpatient Physical Therapy    Treatment Note        Date: 2023  Patient: Yefri Dillard  : 1960   Confirmed: Yes  MRN: [id number]  Referring Provider: Mary Basurto PA-C    Medical Diagnosis: Unspecified rotator cuff tear or rupture of unspecified shoulder, not specified as traumatic [M75.100]       Treatment Diagnosis: L shoulder PROM, decreased thoracic rotation AROM, decreased L UE strength, decreased posture, and p/o L shoulder pain    Visit Information:  Insurance: Payor: [employer] / Plan: Hua Solbinh / Product Type: *No Product type* /   PT Visit Information  Onset Date: 23  Total # of Visits to Date: 15  Plan of Care/Certification Expiration Date: 23  No Show: 0  Progress Note Due Date: 23  Canceled Appointment: 0  Progress Note Counter:  (PN due 23)    Subjective Information:  Subjective: Pt reports she saw the MD who was very pleased w/ progress. Pt brings order that states \"Full AROM/PROM increase strengthening as tolerated\"  HEP Compliance:  [x] Good [] Fair [] Poor [] Reports not doing due to:    Pain Screening  Patient Currently in Pain: Denies    Treatment:  Exercises:  Exercises  Exercise 3: UBE L2.0 2'F/2'R  Exercise 5: bow and arrow YTB x10 B  Exercise 8: rows, lats, IR, ER GTB x15 each  Exercise 9: standing flex, abd to 90 w/ 2# x10 - attempted w/ YTB w/ pt unable therefore progressed to 2# DB w/ pt able to perform  Exercise 10: bent over scap series supported on table rows 5#, ext 3#, horizontal abd (neutral) 2# x10ea  Exercise 12: S/L ER/abd/SA punch 5'' x10 w/ 2# wt  Exercise 16: reaching at number wall 0.5kg ball x3  Exercise 20: HEP: bow and arrow  Treatment Reasoning  Limitations addressed:  Mobility, Strength      *Indicates exercise, modality, or manual techniques to be initiated when appropriate    Objective Measures:     LTG 4 Current Status[de-identified] 23 standing AROM L shoulder flex 165

## 2023-06-27 ENCOUNTER — HOSPITAL ENCOUNTER (OUTPATIENT)
Dept: PHYSICAL THERAPY | Age: 63
Setting detail: THERAPIES SERIES
Discharge: HOME | End: 2023-06-27
Payer: MEDICARE

## 2023-06-27 PROCEDURE — 97140 MANUAL THERAPY 1/> REGIONS: CPT

## 2023-06-27 PROCEDURE — 97110 THERAPEUTIC EXERCISES: CPT

## 2023-06-30 ENCOUNTER — APPOINTMENT (OUTPATIENT)
Dept: PHYSICAL THERAPY | Age: 63
End: 2023-06-30
Payer: MEDICARE

## 2023-07-07 ENCOUNTER — HOSPITAL ENCOUNTER (OUTPATIENT)
Dept: PHYSICAL THERAPY | Age: 63
Setting detail: THERAPIES SERIES
Discharge: HOME | End: 2023-07-07
Payer: MEDICARE

## 2023-07-07 PROCEDURE — 97110 THERAPEUTIC EXERCISES: CPT

## 2023-07-07 ASSESSMENT — PAIN DESCRIPTION - LOCATION: LOCATION: SHOULDER

## 2023-07-07 ASSESSMENT — PAIN DESCRIPTION - DESCRIPTORS: DESCRIPTORS: SORE

## 2023-07-07 ASSESSMENT — PAIN SCALES - GENERAL: PAINLEVEL_OUTOF10: 0

## 2023-07-07 ASSESSMENT — PAIN DESCRIPTION - ORIENTATION: ORIENTATION: LEFT

## 2023-07-07 NOTE — PROGRESS NOTES
[address] [facility]  Outpatient Physical Therapy    Treatment Note        Date: 2023  Patient: Shanthi Oates  : 1960   Confirmed: Yes  MRN: [id number]  Referring Provider: Lisa Sevilla PA-C    Medical Diagnosis: Unspecified rotator cuff tear or rupture of unspecified shoulder, not specified as traumatic [M75.100]       Treatment Diagnosis: L shoulder PROM, decreased thoracic rotation AROM, decreased L UE strength, decreased posture, and p/o L shoulder pain    Visit Information:  Insurance: Payor: Annalisa Rogers / Plan: Yonathan Neal / Product Type: *No Product type* /   PT Visit Information  Onset Date: 23  Total # of Visits to Date: 12  Plan of Care/Certification Expiration Date: 23  No Show: 0  Progress Note Due Date: 23  Canceled Appointment: 0  Progress Note Counter:  (PN due 23)    Subjective Information:  Subjective: Pt has no pain just some stuffness in L shoulder.  Pt has a small amout of swelling after doing activities at home but goes away with rest.  HEP Compliance:  [x] Good [] Fair [] Poor [] Reports not doing due to:    Pain Screening  Pain Assessment: 0-10  Pain Level: 0  Pain Location: Shoulder  Pain Orientation: Left  Pain Descriptors: Sore    Treatment:  Exercises:  Exercises  Exercise 3: UBE L2.0 2'F/2'R  Exercise 5: bow and arrow RTB x10 B  Exercise 8: rows, lats, IR, ER GTB x15 each  Exercise 9: standing flex, abd to 90 w/ 2# x10 - attempted w/ YTB w/ pt unable therefore progressed to 2# DB w/ pt able to perform - held d/t increased pain when attempted today  Exercise 10: bent over scap series supported on table rows 5#, ext 3#, horizontal abd (neutral) 2# x10ea  Exercise 15: clips at tower green & black (12 total) x2  Exercise 16: reaching at number wall 1kg ball x3  Exercise 17: valpar 9 panel 1 <-> panel 2 square x1 LUE only  Exercise 18: Arch rings (12) x2 medium extension           *Indicates exercise, modality,

## 2023-07-14 ENCOUNTER — HOSPITAL ENCOUNTER (OUTPATIENT)
Dept: PHYSICAL THERAPY | Age: 63
Setting detail: THERAPIES SERIES
Discharge: HOME | End: 2023-07-14
Payer: MEDICARE

## 2023-07-14 PROCEDURE — 97140 MANUAL THERAPY 1/> REGIONS: CPT

## 2023-07-14 PROCEDURE — 97110 THERAPEUTIC EXERCISES: CPT

## 2023-07-14 NOTE — PROGRESS NOTES
[address] [facility]  Outpatient Physical Therapy    Treatment Note        Date: 2023  Patient: Spence Meckel  : 1960   Confirmed: Yes  MRN: [id number]  Referring Provider: Kezia Harris PA-C    Medical Diagnosis: Unspecified rotator cuff tear or rupture of unspecified shoulder, not specified as traumatic [M75.100]       Treatment Diagnosis: L shoulder PROM, decreased thoracic rotation AROM, decreased L UE strength, decreased posture, and p/o L shoulder pain    Visit Information:  Insurance: Payor: [employer] / Plan: [employer] / Product Type: *No Product type* /   PT Visit Information  Onset Date: 23  Total # of Visits to Date: 16  Plan of Care/Certification Expiration Date: 23  No Show: 0  Progress Note Due Date: 23  Canceled Appointment: 0  Progress Note Counter:  (PN due 23)    Subjective Information:  Subjective: Pt reports \"it's going really good. \" Pt reports some difficulty still with reaching behind her back, otherwise feels it's getting stronger. HEP Compliance:  [x] Good [] Fair [] Poor [] Reports not doing due to:    Pain Screening  Patient Currently in Pain: Denies    Treatment:  Exercises:  Exercises  Exercise 3: UBE L2.5 2'F/2'R  Exercise 18: chest pulls RTB IR/ER/diagonals  Exercise 20: discussed current HEP and progressions as tolerated. Provided pt w/ RTB, GTB, and BTB.  verbally discussed chest pulls, pt declined paper copy       *Indicates exercise, modality, or manual techniques to be initiated when appropriate    Objective Measures: x20    STG 1 Current Status[de-identified] 23 pt previously met goal  STG 2 Current Status[de-identified] 23 pt requires <25% VC's for posture  STG 3 Current Status[de-identified] 23 B thoracic 100%     LTG 1 Current Status[de-identified] 23 pt reports indep/compliant w/ current  LTG 2 Current Status[de-identified] 23 79/80  LTG 3 Current Status[de-identified] 23 L shoulder flex, abd, IR, ER to 4/5; MT/rhomboid 4-/5, LT 3+/5; pt

## 2023-07-14 NOTE — PROGRESS NOTES
[facility]  PHYSICAL THERAPY PLAN OF CARE   [address] [facility] [place] Aquino, [address] [facility]         Phone: [phone]  Fax: [phone]    [] Certification  [] Recertification []  Plan of Care  [x] Progress Note [] Discharge      Referring Provider: Shahzad Mcgee PA-C     From:  Cassie Myles PT, DPT  Patient: Humberto Fall (12 y.o. female) : 1960 Date: 2023  Medical Diagnosis: Unspecified rotator cuff tear or rupture of unspecified shoulder, not specified as traumatic [M75.100]       Treatment Diagnosis: L shoulder PROM, decreased thoracic rotation AROM, decreased L UE strength, decreased posture, and p/o L shoulder pain    Plan of Care/Certification Expiration Date: 23   Progress Report Period from:  2023  to 2023    Visits to Date: 17 No Show: 0 Cancelled Appts: 0    OBJECTIVE:   Short Term Goals - Time Frame for Short Term Goals: 3 weeks    Goals Current/Discharge status  Status   Short Term Goal 1: The pt will demo improved L shoulder PROM ER 30, flex, abd 90* in order to increase ease with AROM once able per protocol  STG 1 Current Status[de-identified] 23 pt previously met goal   Met   Short Term Goal 2: The pt will demonstrate improved postural awareness requiring <25% VC's with exercises  STG 2 Current Status[de-identified] 23 pt requires <25% VC's for posture   Met   Short Term Goal 3: The pt will demo improved B thoracic rotation AROM WNL's in order to increase UE biomechanics and ease with shoulder AROM as able per protocol  STG 3 Current Status[de-identified] 23 B thoracic 100%   Met     Long Term Goals - Time Frame for Long Term Goals : 10 weeks  Goals Current/ Discharge status Status   Long Term Goal 1: The pt will be indep/compliant with HEP in order to self-manage symptoms upon D/C LTG 1 Current Status[de-identified] 23 pt reports indep/compliant w/ current   Met   Long Term Goal 2: The pt will have an increase in UEFI score >/=60/80 points in order to increase

## 2023-07-21 ENCOUNTER — APPOINTMENT (OUTPATIENT)
Dept: PHYSICAL THERAPY | Age: 63
End: 2023-07-21
Payer: MEDICARE

## 2023-07-28 ENCOUNTER — APPOINTMENT (OUTPATIENT)
Dept: PHYSICAL THERAPY | Age: 63
End: 2023-07-28
Payer: MEDICARE

## 2023-12-05 ENCOUNTER — APPOINTMENT (OUTPATIENT)
Dept: PRIMARY CARE | Facility: CLINIC | Age: 63
End: 2023-12-05
Payer: MEDICARE

## 2024-01-09 ENCOUNTER — OFFICE VISIT (OUTPATIENT)
Dept: PRIMARY CARE | Facility: CLINIC | Age: 64
End: 2024-01-09
Payer: MEDICARE

## 2024-01-09 VITALS
HEIGHT: 69 IN | BODY MASS INDEX: 31.4 KG/M2 | OXYGEN SATURATION: 98 % | WEIGHT: 212 LBS | SYSTOLIC BLOOD PRESSURE: 108 MMHG | DIASTOLIC BLOOD PRESSURE: 70 MMHG | HEART RATE: 68 BPM | RESPIRATION RATE: 16 BRPM | TEMPERATURE: 97.1 F

## 2024-01-09 DIAGNOSIS — Z00.00 ENCOUNTER FOR MEDICARE ANNUAL WELLNESS EXAM: Primary | ICD-10-CM

## 2024-01-09 DIAGNOSIS — R73.9 HYPERGLYCEMIA: ICD-10-CM

## 2024-01-09 DIAGNOSIS — Z00.00 PHYSICAL EXAM: ICD-10-CM

## 2024-01-09 DIAGNOSIS — F41.9 ANXIETY DISORDER, UNSPECIFIED TYPE: ICD-10-CM

## 2024-01-09 DIAGNOSIS — Z23 NEED FOR VACCINATION: ICD-10-CM

## 2024-01-09 DIAGNOSIS — Z12.31 ENCOUNTER FOR SCREENING MAMMOGRAM FOR MALIGNANT NEOPLASM OF BREAST: ICD-10-CM

## 2024-01-09 DIAGNOSIS — E78.2 MIXED HYPERLIPIDEMIA: ICD-10-CM

## 2024-01-09 PROCEDURE — 3008F BODY MASS INDEX DOCD: CPT | Performed by: FAMILY MEDICINE

## 2024-01-09 PROCEDURE — 1159F MED LIST DOCD IN RCRD: CPT | Performed by: FAMILY MEDICINE

## 2024-01-09 PROCEDURE — 1160F RVW MEDS BY RX/DR IN RCRD: CPT | Performed by: FAMILY MEDICINE

## 2024-01-09 PROCEDURE — 1036F TOBACCO NON-USER: CPT | Performed by: FAMILY MEDICINE

## 2024-01-09 PROCEDURE — G0009 ADMIN PNEUMOCOCCAL VACCINE: HCPCS | Performed by: FAMILY MEDICINE

## 2024-01-09 PROCEDURE — G0444 DEPRESSION SCREEN ANNUAL: HCPCS | Performed by: FAMILY MEDICINE

## 2024-01-09 PROCEDURE — 99397 PER PM REEVAL EST PAT 65+ YR: CPT | Performed by: FAMILY MEDICINE

## 2024-01-09 PROCEDURE — 90677 PCV20 VACCINE IM: CPT | Performed by: FAMILY MEDICINE

## 2024-01-09 PROCEDURE — G0446 INTENS BEHAVE THER CARDIO DX: HCPCS | Performed by: FAMILY MEDICINE

## 2024-01-09 PROCEDURE — G0439 PPPS, SUBSEQ VISIT: HCPCS | Performed by: FAMILY MEDICINE

## 2024-01-09 PROCEDURE — 1170F FXNL STATUS ASSESSED: CPT | Performed by: FAMILY MEDICINE

## 2024-01-09 ASSESSMENT — ACTIVITIES OF DAILY LIVING (ADL)
MANAGING_FINANCES: INDEPENDENT
BATHING: INDEPENDENT
DOING_HOUSEWORK: INDEPENDENT
GROCERY_SHOPPING: INDEPENDENT
TAKING_MEDICATION: INDEPENDENT
DRESSING: INDEPENDENT

## 2024-01-09 ASSESSMENT — ENCOUNTER SYMPTOMS
OCCASIONAL FEELINGS OF UNSTEADINESS: 0
LOSS OF SENSATION IN FEET: 0
DEPRESSION: 0

## 2024-01-09 NOTE — PROGRESS NOTES
Subjective   Reason for Visit: Vandana Cramer is a 63 y.o. female here for a Medicare Wellness visit.   Covid vax: UTD  Flu: UTD  Pneumo: UTD  RSV: UTD  Shingles: UTD     CRC: 2017-has order  Mammogram: 2/2022-ordered  Pap: 12/2020  Lmp: ablation  CHECKLIST REVIEWED AND COMPLETE FOR AMW    Past Medical, Surgical, and Family History reviewed and updated in chart.    Reviewed all medications by prescribing practitioner or clinical pharmacist (such as prescriptions, OTCs, herbal therapies and supplements) and documented in the medical record.  Hyperglycemia, Hyperlipidemia, and Anxiety  HPI    Patient Self Assessment of Health Status  Patient Self Assessment: Good    Nutrition and Exercise  Current Diet: HEALTHY Diet always best, minimizing excess carbs  Adequate Fluid Intake: Yes  Caffeine: -aware to minimize intake  Exercise Frequency: Regularly advised    Functional Ability/Level of Safety  Cognitive Impairment Observed: No cognitive impairment observed    Home Safety Risk Factors: None    Patient Care Team:  Jennifer Agudelo MD as PCP - General    HPI  Patient Active Problem List   Diagnosis    Allergic rhinitis    Colon polyps    Arthralgia    Anxiety disorder    Hyperglycemia    Mixed hyperlipidemia    Seasonal allergies    Shoulder impingement    Shoulder pain, bilateral    Shoulder weakness    Tinnitus    Class 1 obesity with body mass index (BMI) of 30.0 to 30.9 in adult    Other unilateral secondary osteoarthritis of knee      Past Surgical History:   Procedure Laterality Date    COLONOSCOPY W/ POLYPECTOMY  2017 1 polyp-due 2022    ENDOMETRIAL ABLATION  2009    SHOULDER SURGERY Left 2023    bone chip, spur, tear       Review of Systems  This patient has   NO history of recent Covid nor flu symptoms,  NO Fever nor chills,  NO Chest pain, shortness of breath nor paroxysmal nocturnal dyspnea,  NO Nausea, vomiting, nor diarrhea,  NO Hematochezia nor melena,  NO Dysuria, hematuria, nor new incontinence  "issues  NO new severe headaches nor neurological complaints,  NO new issues with anxiety nor depression nor new psychiatric complaints,  NO suicidal nor homicidal ideations.     OBJECTIVE:  /70   Pulse 68   Temp 36.2 °C (97.1 °F) (Temporal)   Resp 16   Ht 1.753 m (5' 9\")   Wt 96.2 kg (212 lb)   LMP  (LMP Unknown)   SpO2 98%   BMI 31.31 kg/m²      General:  alert, oriented, no acute distress. Somewhat anxious=baseline  No obvious skin rashes noted.   No gait disturbance noted.    Mood is pleasant, not tearful, no signs of emotional distress.  Not appearing intoxicated or altered.   No voiced delusions,   Normal, appropriate behavior.    HEENT: Normocephalic, atraumatic,   Pupils round, reactive to light  Extraocular motions intact and wnl  Tympanic membranes normal    Neck: no nuchal rigidity  No masses palpable.  No carotid bruits.  No thyromegaly.    Respiratory: Equal breath sounds  No wheezes,    rales,    nor rhonchi  No respiratory distress.    Heart: Regular rate and rhythm, no    murmurs  no rubs/gallops    Abdomen: no masses palpable, no rebound nor guarding, no rebound nor guarding.    Extremities: NO cyanosis noted, no clubbing.   No edema noted.  2+dorsalis pedis pulses.    Normal-not antalgic, steady gait.    No visits with results within 3 Month(s) from this visit.   Latest known visit with results is:   Office Visit on 06/14/2023   Component Date Value Ref Range Status    POC HEMOGLOBIN A1c 06/14/2023 5.4  4.2 - 6.5 % Final        Assessment/Plan     Problem List Items Addressed This Visit       Anxiety disorder    Hyperglycemia    Mixed hyperlipidemia     Other Visit Diagnoses       Encounter for Medicare annual wellness exam    -  Primary    Encounter for screening mammogram for malignant neoplasm of breast        Relevant Orders    BI mammo bilateral screening tomosynthesis    Need for vaccination        Relevant Orders    Pneumococcal conjugate vaccine, 20-valent (PREVNAR 20) " (Completed)          Advance Care Planning Note   Discussion Date: 01/09/24   Discussion Participants: patient    The patient wishes to discuss Advance Care Planning today and the following is a brief summary of our discussion.     Patient has capacity to make their own medical decisions: Yes  Health Care Agent/Surrogate Decision Maker documented in chart: Yes  Documents on file and valid:  Advance Directive/Living Will: Yes   Health Care Power of : Yes  Communication of Medical Status/Prognosis:   yes   Communication of Treatment Goals/Options:   yes  Treatment Decisions  yes  Time Statement: Total face to face time spent on advance care planning was <30 minutes with <30 minutes spent in counseling, including the explanation.    SEE ME AT NEXT REGULARLY SCHEDULED VISIT-sooner if condition deteriorates or new problems arise.  Full code desired  No hi/si mood is stable  Labs due  The patient is aware that results will be forthcoming of ALL planned labs and or tests. If no results are received on my chart or by letter within 1 - 3 weeks, the patient is aware they need to call to obtain results, as this is not usual. Also, if any new conditions arise, or current condition worsens, it is understood that sooner appointment should be made or urgent care/convenient care or emergency room treatment should be sought depending on severity. Otherwise follow up for recheck at regular intervals as we have discussed, at least yearly.    Ascvd 3.9  Offered and agreed to cardiac score  The patient is aware that results will be forthcoming of ALL planned labs and or tests. If no results are received on my chart or by letter within 1 - 3 weeks, the patient is aware they need to call to obtain results, as this is not usual. Also, if any new conditions arise, or current condition worsens, it is understood that sooner appointment should be made or urgent care/convenient care or emergency room treatment should be sought depending  on severity. Otherwise follow up for recheck at regular intervals as we have discussed, at least yearly.

## 2024-01-09 NOTE — PROGRESS NOTES
Covid vax: UTD  Flu: UTD  Pneumo: UTD  RSV: UTD  Shingles: UTD    CRC: 2017-has order  Mammogram: 2/2022-ordered  Pap: 12/2020  Lmp: ablation

## 2024-01-09 NOTE — PROGRESS NOTES
"Subjective   Patient ID: Vandana Cramer is a 63 y.o. female who presents for Hyperglycemia, Hyperlipidemia, and Anxiety.  Covid vax: UTD  Flu: UTD  Pneumo: UTD  RSV: UTD  Shingles: UTD     CRC: 2017-has order  Mammogram: 2/2022-ordered  Pap: 12/2020  Lmp: ablation  HPI  Patient Active Problem List   Diagnosis   • Allergic rhinitis   • Colon polyps   • Arthralgia   • Anxiety disorder   • Hyperglycemia   • Mixed hyperlipidemia   • Seasonal allergies   • Shoulder impingement   • Shoulder pain, bilateral   • Shoulder weakness   • Tinnitus   • Class 1 obesity with body mass index (BMI) of 30.0 to 30.9 in adult   • Other unilateral secondary osteoarthritis of knee       Past Surgical History:   Procedure Laterality Date   • COLONOSCOPY W/ POLYPECTOMY  2017 1 polyp-due 2022   • ENDOMETRIAL ABLATION  2009   • SHOULDER SURGERY Left 2023    bone chip, spur, tear       Review of Systems  This patient has   NO history of recent Covid nor flu symptoms,  NO Fever nor chills,  NO Chest pain, shortness of breath nor paroxysmal nocturnal dyspnea,  NO Nausea, vomiting, nor diarrhea,  NO Hematochezia nor melena,  NO Dysuria, hematuria, nor new incontinence issues  NO new severe headaches nor neurological complaints,  NO new issues with anxiety nor depression nor new psychiatric complaints,  NO suicidal nor homicidal ideations.     OBJECTIVE:  /70   Pulse 68   Temp 36.2 °C (97.1 °F) (Temporal)   Resp 16   Ht 1.753 m (5' 9\")   Wt 96.2 kg (212 lb)   LMP  (LMP Unknown)   SpO2 98%   BMI 31.31 kg/m²      General:  alert, oriented, no acute distress.  No obvious skin rashes noted.   No gait disturbance noted.    Mood is pleasant, not tearful, no signs of emotional distress.  Not appearing intoxicated or altered.   No voiced delusions,   Normal, appropriate behavior.    HEENT: Normocephalic, atraumatic,   Pupils round, reactive to light  Extraocular motions intact and wnl  Tympanic membranes normal    Neck: no nuchal " rigidity  No masses palpable.  No carotid bruits.  No thyromegaly.    Respiratory: Equal breath sounds  No wheezes,    rales,    nor rhonchi  No respiratory distress.    Heart: Regular rate and rhythm, no    murmurs  no rubs/gallops    Abdomen: no masses palpable, nontender, no rebound nor guarding.    Extremities: NO cyanosis noted, no clubbing.   No edema noted.  2+dorsalis pedis pulses.    Normal-not antalgic, steady gait.    No visits with results within 3 Month(s) from this visit.   Latest known visit with results is:   Office Visit on 06/14/2023   Component Date Value Ref Range Status   • POC HEMOGLOBIN A1c 06/14/2023 5.4  4.2 - 6.5 % Final        Assessment/Plan     Problem List Items Addressed This Visit       Anxiety disorder    Hyperglycemia    Mixed hyperlipidemia     Other Visit Diagnoses       Encounter for Medicare annual wellness exam    -  Primary    Encounter for screening mammogram for malignant neoplasm of breast        Relevant Orders    BI mammo bilateral screening tomosynthesis    Need for vaccination        Relevant Orders    Pneumococcal conjugate vaccine, 20-valent (PREVNAR 20)

## 2024-01-10 ENCOUNTER — PATIENT MESSAGE (OUTPATIENT)
Dept: PRIMARY CARE | Facility: CLINIC | Age: 64
End: 2024-01-10

## 2024-01-10 ENCOUNTER — LAB (OUTPATIENT)
Dept: LAB | Facility: LAB | Age: 64
End: 2024-01-10
Payer: MEDICARE

## 2024-01-10 DIAGNOSIS — R73.9 HYPERGLYCEMIA: ICD-10-CM

## 2024-01-10 DIAGNOSIS — E78.2 MIXED HYPERLIPIDEMIA: ICD-10-CM

## 2024-01-10 DIAGNOSIS — Z00.00 ENCOUNTER FOR MEDICARE ANNUAL WELLNESS EXAM: ICD-10-CM

## 2024-01-10 DIAGNOSIS — Z23 NEED FOR VACCINATION: ICD-10-CM

## 2024-01-10 DIAGNOSIS — F41.9 ANXIETY DISORDER, UNSPECIFIED TYPE: ICD-10-CM

## 2024-01-10 DIAGNOSIS — Z12.31 ENCOUNTER FOR SCREENING MAMMOGRAM FOR MALIGNANT NEOPLASM OF BREAST: ICD-10-CM

## 2024-01-10 LAB
ALBUMIN SERPL BCP-MCNC: 4.3 G/DL (ref 3.4–5)
ALP SERPL-CCNC: 63 U/L (ref 33–136)
ALT SERPL W P-5'-P-CCNC: 13 U/L (ref 7–45)
ANION GAP SERPL CALC-SCNC: 10 MMOL/L (ref 10–20)
AST SERPL W P-5'-P-CCNC: 19 U/L (ref 9–39)
BASOPHILS # BLD AUTO: 0.05 X10*3/UL (ref 0–0.1)
BASOPHILS NFR BLD AUTO: 0.8 %
BILIRUB SERPL-MCNC: 0.5 MG/DL (ref 0–1.2)
BUN SERPL-MCNC: 18 MG/DL (ref 6–23)
CALCIUM SERPL-MCNC: 10.2 MG/DL (ref 8.6–10.3)
CHLORIDE SERPL-SCNC: 105 MMOL/L (ref 98–107)
CHOLEST SERPL-MCNC: 217 MG/DL (ref 0–199)
CHOLESTEROL/HDL RATIO: 3.4
CO2 SERPL-SCNC: 29 MMOL/L (ref 21–32)
CREAT SERPL-MCNC: 0.9 MG/DL (ref 0.5–1.05)
EGFRCR SERPLBLD CKD-EPI 2021: 71 ML/MIN/1.73M*2
EOSINOPHIL # BLD AUTO: 0.14 X10*3/UL (ref 0–0.7)
EOSINOPHIL NFR BLD AUTO: 2.3 %
ERYTHROCYTE [DISTWIDTH] IN BLOOD BY AUTOMATED COUNT: 13.7 % (ref 11.5–14.5)
EST. AVERAGE GLUCOSE BLD GHB EST-MCNC: 114 MG/DL
GLUCOSE SERPL-MCNC: 88 MG/DL (ref 74–99)
HBA1C MFR BLD: 5.6 %
HCT VFR BLD AUTO: 40.6 % (ref 36–46)
HDLC SERPL-MCNC: 63.5 MG/DL
HGB BLD-MCNC: 13.3 G/DL (ref 12–16)
IMM GRANULOCYTES # BLD AUTO: 0.02 X10*3/UL (ref 0–0.7)
IMM GRANULOCYTES NFR BLD AUTO: 0.3 % (ref 0–0.9)
LDLC SERPL CALC-MCNC: 134 MG/DL
LYMPHOCYTES # BLD AUTO: 1.77 X10*3/UL (ref 1.2–4.8)
LYMPHOCYTES NFR BLD AUTO: 29 %
MCH RBC QN AUTO: 29.9 PG (ref 26–34)
MCHC RBC AUTO-ENTMCNC: 32.8 G/DL (ref 32–36)
MCV RBC AUTO: 91 FL (ref 80–100)
MONOCYTES # BLD AUTO: 0.35 X10*3/UL (ref 0.1–1)
MONOCYTES NFR BLD AUTO: 5.7 %
NEUTROPHILS # BLD AUTO: 3.77 X10*3/UL (ref 1.2–7.7)
NEUTROPHILS NFR BLD AUTO: 61.9 %
NON HDL CHOLESTEROL: 154 MG/DL (ref 0–149)
NRBC BLD-RTO: 0 /100 WBCS (ref 0–0)
PLATELET # BLD AUTO: 289 X10*3/UL (ref 150–450)
POTASSIUM SERPL-SCNC: 4 MMOL/L (ref 3.5–5.3)
PROT SERPL-MCNC: 7.3 G/DL (ref 6.4–8.2)
RBC # BLD AUTO: 4.45 X10*6/UL (ref 4–5.2)
SODIUM SERPL-SCNC: 140 MMOL/L (ref 136–145)
T4 FREE SERPL-MCNC: 0.9 NG/DL (ref 0.61–1.12)
TRIGL SERPL-MCNC: 96 MG/DL (ref 0–149)
TSH SERPL-ACNC: 2.65 MIU/L (ref 0.44–3.98)
VLDL: 19 MG/DL (ref 0–40)
WBC # BLD AUTO: 6.1 X10*3/UL (ref 4.4–11.3)

## 2024-01-10 PROCEDURE — 85025 COMPLETE CBC W/AUTO DIFF WBC: CPT

## 2024-01-10 PROCEDURE — 36415 COLL VENOUS BLD VENIPUNCTURE: CPT

## 2024-01-10 PROCEDURE — 83036 HEMOGLOBIN GLYCOSYLATED A1C: CPT

## 2024-01-10 PROCEDURE — 80053 COMPREHEN METABOLIC PANEL: CPT

## 2024-01-10 PROCEDURE — 80061 LIPID PANEL: CPT

## 2024-01-10 PROCEDURE — 84439 ASSAY OF FREE THYROXINE: CPT

## 2024-01-10 PROCEDURE — 84443 ASSAY THYROID STIM HORMONE: CPT

## 2024-01-10 RX ORDER — ATORVASTATIN CALCIUM 20 MG/1
20 TABLET, FILM COATED ORAL DAILY
Qty: 90 TABLET | Refills: 0 | Status: SHIPPED | OUTPATIENT
Start: 2024-01-10 | End: 2024-04-05 | Stop reason: SDUPTHER

## 2024-01-10 NOTE — TELEPHONE ENCOUNTER
From: Vandana Cramer  To: Jennifer Agudelo MD  Sent: 1/10/2024 4:35 PM EST  Subject: Try a statin?    Sure I’ll try but I don’t want one that makes me cough lol.   I’ll give it a try.

## 2024-01-18 ENCOUNTER — ANCILLARY PROCEDURE (OUTPATIENT)
Dept: RADIOLOGY | Facility: CLINIC | Age: 64
End: 2024-01-18
Payer: MEDICARE

## 2024-01-18 DIAGNOSIS — Z12.31 ENCOUNTER FOR SCREENING MAMMOGRAM FOR MALIGNANT NEOPLASM OF BREAST: ICD-10-CM

## 2024-01-18 DIAGNOSIS — Z00.00 PHYSICAL EXAM: ICD-10-CM

## 2024-01-18 PROCEDURE — 77063 BREAST TOMOSYNTHESIS BI: CPT | Performed by: RADIOLOGY

## 2024-01-18 PROCEDURE — 77067 SCR MAMMO BI INCL CAD: CPT

## 2024-01-18 PROCEDURE — 75571 CT HRT W/O DYE W/CA TEST: CPT

## 2024-01-18 PROCEDURE — 77067 SCR MAMMO BI INCL CAD: CPT | Performed by: RADIOLOGY

## 2024-01-19 ENCOUNTER — HOSPITAL ENCOUNTER (OUTPATIENT)
Dept: RADIOLOGY | Facility: EXTERNAL LOCATION | Age: 64
Discharge: HOME | End: 2024-01-19

## 2024-01-19 DIAGNOSIS — Z12.31 SCREENING MAMMOGRAM, ENCOUNTER FOR: ICD-10-CM

## 2024-01-23 ENCOUNTER — OFFICE VISIT (OUTPATIENT)
Dept: PRIMARY CARE | Facility: CLINIC | Age: 64
End: 2024-01-23
Payer: MEDICARE

## 2024-01-23 VITALS
RESPIRATION RATE: 16 BRPM | OXYGEN SATURATION: 95 % | HEIGHT: 69 IN | HEART RATE: 99 BPM | BODY MASS INDEX: 31.1 KG/M2 | TEMPERATURE: 97.6 F | DIASTOLIC BLOOD PRESSURE: 83 MMHG | SYSTOLIC BLOOD PRESSURE: 128 MMHG | WEIGHT: 210 LBS

## 2024-01-23 DIAGNOSIS — J01.00 ACUTE NON-RECURRENT MAXILLARY SINUSITIS: Primary | ICD-10-CM

## 2024-01-23 DIAGNOSIS — R93.1 ELEVATED CORONARY ARTERY CALCIUM SCORE: ICD-10-CM

## 2024-01-23 PROCEDURE — 99214 OFFICE O/P EST MOD 30 MIN: CPT | Performed by: NURSE PRACTITIONER

## 2024-01-23 RX ORDER — AMOXICILLIN AND CLAVULANATE POTASSIUM 875; 125 MG/1; MG/1
875 TABLET, FILM COATED ORAL 2 TIMES DAILY
Qty: 20 TABLET | Refills: 0 | Status: SHIPPED | OUTPATIENT
Start: 2024-01-23 | End: 2024-02-02

## 2024-01-23 ASSESSMENT — ENCOUNTER SYMPTOMS
SORE THROAT: 1
FATIGUE: 0
SINUS PAIN: 1
SINUS PRESSURE: 1
RHINORRHEA: 1
WHEEZING: 0
CHILLS: 0
ACTIVITY CHANGE: 0
COUGH: 1
HEADACHES: 1
SWOLLEN GLANDS: 1
PALPITATIONS: 0
FEVER: 0

## 2024-01-23 ASSESSMENT — PATIENT HEALTH QUESTIONNAIRE - PHQ9
1. LITTLE INTEREST OR PLEASURE IN DOING THINGS: NOT AT ALL
SUM OF ALL RESPONSES TO PHQ9 QUESTIONS 1 AND 2: 0
2. FEELING DOWN, DEPRESSED OR HOPELESS: NOT AT ALL

## 2024-01-23 NOTE — PROGRESS NOTES
"Subjective   Patient ID: Vandana Cramer is a 63 y.o. female who presents for URI.    Patient states post nasal drip     URI   This is a new problem. The current episode started in the past 7 days. The problem has been gradually worsening. There has been no fever. Associated symptoms include congestion, coughing, ear pain, headaches, a plugged ear sensation, rhinorrhea, sinus pain, sneezing, a sore throat and swollen glands. Pertinent negatives include no chest pain or wheezing. She has tried acetaminophen, decongestant and antihistamine for the symptoms. The treatment provided mild relief.        Review of Systems   Constitutional:  Negative for activity change, chills, fatigue and fever.   HENT:  Positive for congestion, ear discharge, ear pain, postnasal drip, rhinorrhea, sinus pressure, sinus pain, sneezing and sore throat.    Respiratory:  Positive for cough. Negative for wheezing.    Cardiovascular:  Negative for chest pain and palpitations.   Neurological:  Positive for headaches.       Objective   /83 Comment: auto  Pulse 99   Temp 36.4 °C (97.6 °F) (Temporal)   Resp 16   Ht 1.753 m (5' 9\")   Wt 95.3 kg (210 lb)   LMP  (LMP Unknown)   SpO2 95%   BMI 31.01 kg/m²     Physical Exam  Vitals reviewed.   Constitutional:       General: She is not in acute distress.     Appearance: Normal appearance. She is not ill-appearing.   HENT:      Head: Normocephalic.      Right Ear: Ear canal and external ear normal. There is no impacted cerumen.      Left Ear: Ear canal and external ear normal. There is no impacted cerumen.      Ears:      Comments: Fluid present in bilateral tympanic membrane     Nose: Nose normal.      Comments: Pressure and pain upon palpation of right maxillary sinus      Mouth/Throat:      Mouth: Mucous membranes are moist.      Pharynx: Posterior oropharyngeal erythema present.   Eyes:      General:         Right eye: No discharge.         Left eye: No discharge.      Pupils: Pupils " are equal, round, and reactive to light.   Cardiovascular:      Rate and Rhythm: Normal rate and regular rhythm.      Pulses: Normal pulses.      Heart sounds: Normal heart sounds.   Pulmonary:      Effort: Pulmonary effort is normal.      Breath sounds: Normal breath sounds.   Skin:     General: Skin is warm and dry.      Capillary Refill: Capillary refill takes less than 2 seconds.   Neurological:      General: No focal deficit present.      Mental Status: She is alert and oriented to person, place, and time.   Psychiatric:         Mood and Affect: Mood normal.         Behavior: Behavior normal.         Assessment/Plan   Problem List Items Addressed This Visit    None  Visit Diagnoses         Codes    Acute non-recurrent maxillary sinusitis    -  Primary J01.00    Relevant Medications    amoxicillin-pot clavulanate (Augmentin) 875-125 mg tablet          Discussed diagnosis, treatment and anticipated course of illness with the patient who verbalized understanding. Instructed to increase PO fluid intake and obtain adequate rest. Instructed to take medications as prescribed. Follow up with primary care provider in the event signs and symptoms worsen or fail to improve with treatment plan.

## 2024-01-30 ENCOUNTER — OFFICE VISIT (OUTPATIENT)
Dept: CARDIOLOGY | Facility: CLINIC | Age: 64
End: 2024-01-30
Payer: MEDICARE

## 2024-01-30 VITALS
HEIGHT: 69 IN | DIASTOLIC BLOOD PRESSURE: 82 MMHG | WEIGHT: 211 LBS | BODY MASS INDEX: 31.25 KG/M2 | SYSTOLIC BLOOD PRESSURE: 114 MMHG | HEART RATE: 96 BPM

## 2024-01-30 DIAGNOSIS — R93.1 AGATSTON CORONARY ARTERY CALCIUM SCORE LESS THAN 100: ICD-10-CM

## 2024-01-30 DIAGNOSIS — E78.2 MIXED HYPERLIPIDEMIA: ICD-10-CM

## 2024-01-30 DIAGNOSIS — R73.9 HYPERGLYCEMIA: Primary | ICD-10-CM

## 2024-01-30 DIAGNOSIS — E66.09 CLASS 1 OBESITY DUE TO EXCESS CALORIES WITHOUT SERIOUS COMORBIDITY WITH BODY MASS INDEX (BMI) OF 30.0 TO 30.9 IN ADULT: ICD-10-CM

## 2024-01-30 DIAGNOSIS — R93.1 ELEVATED CORONARY ARTERY CALCIUM SCORE: ICD-10-CM

## 2024-01-30 PROCEDURE — 3008F BODY MASS INDEX DOCD: CPT | Performed by: INTERNAL MEDICINE

## 2024-01-30 PROCEDURE — 99203 OFFICE O/P NEW LOW 30 MIN: CPT | Performed by: INTERNAL MEDICINE

## 2024-01-30 PROCEDURE — 1160F RVW MEDS BY RX/DR IN RCRD: CPT | Performed by: INTERNAL MEDICINE

## 2024-01-30 PROCEDURE — 93000 ELECTROCARDIOGRAM COMPLETE: CPT | Performed by: INTERNAL MEDICINE

## 2024-01-30 PROCEDURE — 1036F TOBACCO NON-USER: CPT | Performed by: INTERNAL MEDICINE

## 2024-01-30 PROCEDURE — 1159F MED LIST DOCD IN RCRD: CPT | Performed by: INTERNAL MEDICINE

## 2024-01-30 NOTE — PROGRESS NOTES
Referred by Dr. Agudelo for New Patient Visit (EVAL. [place])     History Of Present Illness:    Vandana Cramer is a 63 y.o. female presenting with cardiac risk factors, no cardiac symptoms.  In particular review of systems is negative for chest discomfort pressure tightness heaviness palpitations lightheadedness orthopnea paroxysmal nocturnal dyspnea dependent edema or claudication TIA or CVA type symptoms or bleeding diathesis.  She had a coronary calcium score done, it was less than 100, reported to be in the 70th percentile.  Medications reviewed  EKG reviewed  Active, doing more than 4 METS on a regular basis, has been going to the gym since November also does meditation for stress relief, babysits her granddaughter, just retired as a speech pathologist.  Has been following her heart rate variability, and had several questions pertaining to that.  She has noticed that her heart rate variability is very variable, going from the low teens up into the 40 range.    She denies any bleeding diathesis TIA or CVA type symptoms, denies any presyncope or syncope.  Denies vomiting diarrhea or unintentional weight loss.  12 point review of systems is otherwise negative or noncontributory.    Recently had some upper respiratory/sinusitis symptoms, and is finishing a course of Augmentin, and has some GI distress pertaining to that.    Past Medical History:  She has a past medical history of History of HPV infection (2017), History of mammogram (02/14/2022), and Pap test, as part of routine gynecological examination (12/2020).    Past Surgical History:  She has a past surgical history that includes Colonoscopy w/ polypectomy (2017); Endometrial ablation (2009); Shoulder surgery (Left, 2023); and Breast biopsy (Left).      Social History:  She reports that she has never smoked. She has never been exposed to tobacco smoke. She has never used smokeless tobacco. She reports current alcohol use. She reports that she does not use  "drugs.    Family History:  Family History   Problem Relation Name Age of Onset    Colon cancer Mother      Colon cancer Father          Allergies:  Meloxicam, Meperidine, Metformin, and Shellfish derived    Outpatient Medications:  Current Outpatient Medications   Medication Instructions    amoxicillin-pot clavulanate (Augmentin) 875-125 mg tablet 875 mg, oral, 2 times daily    atorvastatin (LIPITOR) 20 mg, oral, Daily    fish oil concentrate (Omega-3) 120-180 mg capsule 1 g, oral, Daily        Last Recorded Vitals:  Vitals:    01/30/24 1251   BP: 114/82   BP Location: Left arm   Patient Position: Sitting   Pulse: 96   Weight: 95.7 kg (211 lb)   Height: 1.753 m (5' 9\")       Physical Exam:    GENERAL APPEARANCE: Well developed, well nourished, in no acute distress.  CHEST: Symmetric and non-tender.  INTEGUMENT: Skin warm and dry, without gross excoriationis or lesions.  HEENT: No gross abnormalities, no jugular venous distention no carotid bruit or scleral icterus  NECK: Supple, no JVD, no bruit. Thyroid not palpable. Carotid upstrokes normal.  NEURO/PSHCY: Alert and oriented x3; appropriate behavior and responses and responses, with normal balance and coordination  LUNGS: Clear to auscultation bilaterally; normal respiratory effort.  HEART: Rate and rhythm regular with no evident murmur; no gallop appreciated. There are no rubs, clicks or heaves.   ABDOMEN: Soft, nontender, no masses  or bruits.   MUSCULOSKELETAL: No obvious deformity identified  EXTREMITIES: Warm  There is no edema noted.  PERIPHERAL VASCULAR: Pulses present and equally palpable; 2+ throughout.          Labs reviewed today : From 1/10/2024 were reviewed to include free T4 and TSH, lipid profile, hemoglobin A1c, comprehensive profile and CBC.  Total cholesterol 217 HDL 64 total cholesterol to HDL ratio 3.4,  triglycerides 96, hemoglobin A1c 5.6     Assessment/Plan       1. Hyperglycemia        2. Elevated coronary artery calcium score  " Referral to Cardiology      3. Mixed hyperlipidemia  Follow Up In Cardiology      4. Class 1 obesity due to excess calories without serious comorbidity with body mass index (BMI) of 30.0 to 30.9 in adult  Follow Up In Cardiology      5. Agatston coronary artery calcium score less than 100  Follow Up In Cardiology      Coronary calcium score January 2024-left main 0 LAD 40 to left circumflex 0 RCA 2 total of 44, 72nd percentile for age gender and race in asymptomatic patients    Assessment:  1.  Postmenopausal female with risk factors, no cardiac symptoms, normal EKG  2.  Patient is concerned about her heart rate variability.  We discussed heart rate variability-heart rate variability is a sign of health, however it is very variable.  In rem sleep for instance heart rate variability is felt to be lower, despite REM sleep being good sleep.  It will also depend upon the age of the person, and each person has a baseline heart rate variability.  It can be improved with lifestyle modification and reducing stress, activities such as meditation, yoga, regular aerobic activity good quality sleep all improve heart rate variability.  Heart rate variability is in effect of the competing effects of sympathetic and parasympathetic tone.  In other words it is a balance between the flight or fight response and the relaxed/calm state.  Artifact is also likely when you monitor heart rate variability through a watch.    Talked about the dynamic nature of coronary artery disease and the importance of ongoing risk factor modification and the importance of seeking prompt medical attention if symptoms develop.  We talked about the mechanism of plaque rupture and its implications.    At this point, I do not believe that additional testing will and to the value of interventions.  Patient can see me on an as-needed basis.    I thank you Dr. Agudelo for allowing me to participate in Vandana's care, please do not hesitate to call if further  questions arise,  Sincerely,    Gia Mcgowan MD [facility]  Provider Attestation - Scribe documentation    All medical record entries made by the Scribe were at my direction and personally dictated by me. I have reviewed the chart and agree that the record accurately reflects my personal performance of the history, physical exam, discussion and plan.       Scribe Attestation  By signing my name below, I, Atiya Rawls MA , Scribe   attest that this documentation has been prepared under the direction and in the presence of Gia Mcgowan MD.

## 2024-04-05 ENCOUNTER — LAB (OUTPATIENT)
Dept: LAB | Facility: LAB | Age: 64
End: 2024-04-05
Payer: MEDICARE

## 2024-04-05 ENCOUNTER — PATIENT MESSAGE (OUTPATIENT)
Dept: PRIMARY CARE | Facility: CLINIC | Age: 64
End: 2024-04-05

## 2024-04-05 DIAGNOSIS — E78.2 MIXED HYPERLIPIDEMIA: ICD-10-CM

## 2024-04-05 DIAGNOSIS — R73.9 HYPERGLYCEMIA: ICD-10-CM

## 2024-04-05 LAB
ALBUMIN SERPL BCP-MCNC: 4.4 G/DL (ref 3.4–5)
ALP SERPL-CCNC: 73 U/L (ref 33–136)
ALT SERPL W P-5'-P-CCNC: 15 U/L (ref 7–45)
ANION GAP SERPL CALC-SCNC: 10 MMOL/L (ref 10–20)
AST SERPL W P-5'-P-CCNC: 19 U/L (ref 9–39)
BILIRUB SERPL-MCNC: 0.5 MG/DL (ref 0–1.2)
BUN SERPL-MCNC: 22 MG/DL (ref 6–23)
CALCIUM SERPL-MCNC: 9.8 MG/DL (ref 8.6–10.3)
CHLORIDE SERPL-SCNC: 108 MMOL/L (ref 98–107)
CHOLEST SERPL-MCNC: 154 MG/DL (ref 0–199)
CHOLESTEROL/HDL RATIO: 2.5
CO2 SERPL-SCNC: 28 MMOL/L (ref 21–32)
CREAT SERPL-MCNC: 0.94 MG/DL (ref 0.5–1.05)
EGFRCR SERPLBLD CKD-EPI 2021: 67 ML/MIN/1.73M*2
EST. AVERAGE GLUCOSE BLD GHB EST-MCNC: 111 MG/DL
GLUCOSE SERPL-MCNC: 88 MG/DL (ref 74–99)
HBA1C MFR BLD: 5.5 %
HDLC SERPL-MCNC: 61.8 MG/DL
LDLC SERPL CALC-MCNC: 78 MG/DL
NON HDL CHOLESTEROL: 92 MG/DL (ref 0–149)
POTASSIUM SERPL-SCNC: 4 MMOL/L (ref 3.5–5.3)
PROT SERPL-MCNC: 7.3 G/DL (ref 6.4–8.2)
SODIUM SERPL-SCNC: 142 MMOL/L (ref 136–145)
TRIGL SERPL-MCNC: 73 MG/DL (ref 0–149)
VLDL: 15 MG/DL (ref 0–40)

## 2024-04-05 PROCEDURE — 80053 COMPREHEN METABOLIC PANEL: CPT

## 2024-04-05 PROCEDURE — 80061 LIPID PANEL: CPT

## 2024-04-05 PROCEDURE — 83036 HEMOGLOBIN GLYCOSYLATED A1C: CPT

## 2024-04-05 PROCEDURE — 36415 COLL VENOUS BLD VENIPUNCTURE: CPT

## 2024-04-05 RX ORDER — ATORVASTATIN CALCIUM 20 MG/1
20 TABLET, FILM COATED ORAL DAILY
Qty: 90 TABLET | Refills: 3 | Status: SHIPPED | OUTPATIENT
Start: 2024-04-05

## 2024-04-05 NOTE — TELEPHONE ENCOUNTER
From: Vandana Cramer  To: Jennifer Agudelo MD  Sent: 4/5/2024 2:27 PM EDT  Subject: Refill for Lipitor    Hi. Can I get the Lipitor refilled? Seems like it’s working. Thank goodness.

## 2024-06-11 PROBLEM — M25.819 SHOULDER IMPINGEMENT: Status: RESOLVED | Noted: 2023-03-24 | Resolved: 2024-06-11

## 2024-06-11 PROBLEM — F41.9 ANXIETY DISORDER: Status: RESOLVED | Noted: 2023-03-24 | Resolved: 2024-06-11

## 2024-06-11 PROBLEM — R29.898 SHOULDER WEAKNESS: Status: RESOLVED | Noted: 2023-03-24 | Resolved: 2024-06-11

## 2024-07-09 ENCOUNTER — APPOINTMENT (OUTPATIENT)
Dept: PRIMARY CARE | Facility: CLINIC | Age: 64
End: 2024-07-09
Payer: MEDICARE

## 2024-07-09 VITALS
WEIGHT: 217 LBS | TEMPERATURE: 97.1 F | HEIGHT: 69 IN | DIASTOLIC BLOOD PRESSURE: 70 MMHG | BODY MASS INDEX: 32.14 KG/M2 | OXYGEN SATURATION: 98 % | RESPIRATION RATE: 16 BRPM | SYSTOLIC BLOOD PRESSURE: 118 MMHG | HEART RATE: 84 BPM

## 2024-07-09 DIAGNOSIS — E78.2 MIXED HYPERLIPIDEMIA: ICD-10-CM

## 2024-07-09 DIAGNOSIS — Z12.11 COLON CANCER SCREENING: ICD-10-CM

## 2024-07-09 DIAGNOSIS — L20.82 FLEXURAL ECZEMA: ICD-10-CM

## 2024-07-09 DIAGNOSIS — J30.89 SEASONAL ALLERGIC RHINITIS DUE TO OTHER ALLERGIC TRIGGER: Primary | ICD-10-CM

## 2024-07-09 DIAGNOSIS — R73.9 HYPERGLYCEMIA: ICD-10-CM

## 2024-07-09 PROCEDURE — 1159F MED LIST DOCD IN RCRD: CPT | Performed by: FAMILY MEDICINE

## 2024-07-09 PROCEDURE — 99214 OFFICE O/P EST MOD 30 MIN: CPT | Performed by: FAMILY MEDICINE

## 2024-07-09 PROCEDURE — RXMED WILLOW AMBULATORY MEDICATION CHARGE

## 2024-07-09 PROCEDURE — 1036F TOBACCO NON-USER: CPT | Performed by: FAMILY MEDICINE

## 2024-07-09 PROCEDURE — 1160F RVW MEDS BY RX/DR IN RCRD: CPT | Performed by: FAMILY MEDICINE

## 2024-07-09 PROCEDURE — 3008F BODY MASS INDEX DOCD: CPT | Performed by: FAMILY MEDICINE

## 2024-07-09 RX ORDER — MOMETASONE FUROATE 1 MG/G
OINTMENT TOPICAL
Qty: 45 G | Refills: 1 | Status: SHIPPED | OUTPATIENT
Start: 2024-07-09

## 2024-07-09 NOTE — PROGRESS NOTES
Covid vax: UTD  Flu: UTD  Pneumo: UTD  RSV: UTD  Shingles: UTD    CRC: 2017  Mammogram: 1/2024  Pap: 2020  Lmp: ablation

## 2024-07-09 NOTE — PROGRESS NOTES
"Subjective   Patient ID: Vandana Cramer is a 64 y.o. female who presents for Hyperlipidemia, Hyperglycemia, and Rash (Left ankle).  Covid vax: UTD  Flu: UTD  Pneumo: UTD  RSV: UTD  Shingles: UTD     CRC: 2017  Mammogram: 1/2024  Pap: 2020  Lmp: ablation  HPI  Patient Active Problem List   Diagnosis    Allergic rhinitis    Colon polyps    Arthralgia    Hyperglycemia    Mixed hyperlipidemia    Seasonal allergies    Shoulder pain, bilateral    Tinnitus    Class 1 obesity with body mass index (BMI) of 30.0 to 30.9 in adult    Other unilateral secondary osteoarthritis of knee    Agatston coronary artery calcium score less than 100       Past Surgical History:   Procedure Laterality Date    BREAST BIOPSY Left     Left core bx benign 2011    COLONOSCOPY W/ POLYPECTOMY  2017 1 polyp-due 2022    ENDOMETRIAL ABLATION  2009    SHOULDER SURGERY Left 2023    bone chip, spur, tear       Review of Systems  This patient has  NO history of seizures/ CAD or CVA    NO history of recent Covid nor flu symptoms,  NO Fever nor chills,  NO Chest pain, shortness of breath nor paroxysmal nocturnal dyspnea,  NO Nausea, vomiting, nor diarrhea,  NO Hematochezia nor melena,  NO Dysuria, hematuria, nor new incontinence issues  NO new severe headaches nor neurological complaints,  NO new issues with anxiety nor depression nor new psychiatric complaints,  NO suicidal nor homicidal ideations.     OBJECTIVE:  /70   Pulse 84   Temp 36.2 °C (97.1 °F) (Temporal)   Resp 16   Ht 1.753 m (5' 9\")   Wt 98.4 kg (217 lb)   SpO2 98%   BMI 32.05 kg/m²      General:  alert, oriented, no acute distress.  No obvious skin rashes noted.  Patch 2-3cm scaly area L ankle    No gait disturbance noted.    Mood is pleasant,  no signs of emotional distress.   Not appearing intoxicated or altered.   No voiced delusions,   Normal, appropriate behavior.    HEENT: Normocephalic, atraumatic,   Pupils round, reactive to light  Extraocular motions intact and " wnl  Tympanic membranes normal    Neck: no nuchal rigidity  No masses palpable.  No carotid bruits.  No thyromegaly.    Respiratory: Equal breath sounds  No wheezes,    rales,    nor rhonchi  No respiratory distress.    Heart: Regular rate and rhythm, no    murmurs  no rubs/gallops    Abdomen: no masses palpable, nontender, no rebound nor guarding.    Extremities: NO cyanosis noted, no clubbing.   No edema noted.  2+dorsalis pedis pulses.    Normal-not antalgic, steady gait.    No visits with results within 3 Month(s) from this visit.   Latest known visit with results is:   Lab on 04/05/2024   Component Date Value Ref Range Status    Glucose 04/05/2024 88  74 - 99 mg/dL Final    Sodium 04/05/2024 142  136 - 145 mmol/L Final    Potassium 04/05/2024 4.0  3.5 - 5.3 mmol/L Final    Chloride 04/05/2024 108 (H)  98 - 107 mmol/L Final    Bicarbonate 04/05/2024 28  21 - 32 mmol/L Final    Anion Gap 04/05/2024 10  10 - 20 mmol/L Final    Urea Nitrogen 04/05/2024 22  6 - 23 mg/dL Final    Creatinine 04/05/2024 0.94  0.50 - 1.05 mg/dL Final    eGFR 04/05/2024 67  >60 mL/min/1.73m*2 Final    Calculations of estimated GFR are performed using the 2021 CKD-EPI Study Refit equation without the race variable for the IDMS-Traceable creatinine methods.  https://jasn.asnjournals.org/content/early/2021/09/22/ASN.4703088750    Calcium 04/05/2024 9.8  8.6 - 10.3 mg/dL Final    Albumin 04/05/2024 4.4  3.4 - 5.0 g/dL Final    Alkaline Phosphatase 04/05/2024 73  33 - 136 U/L Final    Total Protein 04/05/2024 7.3  6.4 - 8.2 g/dL Final    AST 04/05/2024 19  9 - 39 U/L Final    Bilirubin, Total 04/05/2024 0.5  0.0 - 1.2 mg/dL Final    ALT 04/05/2024 15  7 - 45 U/L Final    Patients treated with Sulfasalazine may generate falsely decreased results for ALT.    Cholesterol 04/05/2024 154  0 - 199 mg/dL Final          Age      Desirable   Borderline High   High     0-19 Y     0 - 169       170 - 199     >/= 200    20-24 Y     0 - 189       190 -  224     >/= 225         >24 Y     0 - 199       200 - 239     >/= 240   **All ranges are based on fasting samples. Specific   therapeutic targets will vary based on patient-specific   cardiac risk.    Pediatric guidelines reference:Pediatrics 2011, 128(S5).Adult guidelines reference: NCEP ATPIII Guidelines,SCOTT 2001, 258:2486-97    Venipuncture immediately after or during the administration of Metamizole may lead to falsely low results. Testing should be performed immediately prior to Metamizole dosing.    HDL-Cholesterol 04/05/2024 61.8  mg/dL Final      Age       Very Low   Low     Normal    High    0-19 Y    < 35      < 40     40-45     ----  20-24 Y    ----     < 40      >45      ----        >24 Y      ----     < 40     40-60      >60      Cholesterol/HDL Ratio 04/05/2024 2.5   Final      Ref Values  Desirable  < 3.4  High Risk  > 5.0    LDL Calculated 04/05/2024 78  <=99 mg/dL Final                                Near   Borderline      AGE      Desirable  Optimal    High     High     Very High     0-19 Y     0 - 109     ---    110-129   >/= 130     ----    20-24 Y     0 - 119     ---    120-159   >/= 160     ----      >24 Y     0 -  99   100-129  130-159   160-189     >/=190      VLDL 04/05/2024 15  0 - 40 mg/dL Final    Triglycerides 04/05/2024 73  0 - 149 mg/dL Final       Age         Desirable   Borderline High   High     Very High   0 D-90 D    19 - 174         ----         ----        ----  91 D- 9 Y     0 -  74        75 -  99     >/= 100      ----    10-19 Y     0 -  89        90 - 129     >/= 130      ----    20-24 Y     0 - 114       115 - 149     >/= 150      ----         >24 Y     0 - 149       150 - 199    200- 499    >/= 500    Venipuncture immediately after or during the administration of Metamizole may lead to falsely low results. Testing should be performed immediately prior to Metamizole dosing.    Non HDL Cholesterol 04/05/2024 92  0 - 149 mg/dL Final          Age       Desirable   Borderline  High   High     Very High     0-19 Y     0 - 119       120 - 144     >/= 145    >/= 160    20-24 Y     0 - 149       150 - 189     >/= 190      ----         >24 Y    30 mg/dL above LDL Cholesterol goal      Hemoglobin A1C 04/05/2024 5.5  see below % Final    Estimated Average Glucose 04/05/2024 111  Not Established mg/dL Final        Assessment/Plan     Problem List Items Addressed This Visit       Allergic rhinitis - Primary    Hyperglycemia    Mixed hyperlipidemia     Other Visit Diagnoses       Colon cancer screening        Relevant Orders    Colonoscopy Screening; Average Risk Patient            Follow up at next scheduled visit -as planned  Hba1c cmp in 4-6mo then could be yrly  Low carb diet  Patch likely eczema-if no resolution-needs bx  This medications risks, benefits, and alternatives were discussed with patient at length.  If any unwanted side effects occur-discontinue medicine and call the office for discussion.  See me 6mo

## 2024-07-10 ENCOUNTER — PHARMACY VISIT (OUTPATIENT)
Dept: PHARMACY | Facility: CLINIC | Age: 64
End: 2024-07-10
Payer: COMMERCIAL

## 2024-10-03 ENCOUNTER — PATIENT MESSAGE (OUTPATIENT)
Dept: PRIMARY CARE | Facility: CLINIC | Age: 64
End: 2024-10-03
Payer: MEDICARE

## 2024-10-03 DIAGNOSIS — E78.2 MIXED HYPERLIPIDEMIA: ICD-10-CM

## 2024-10-04 RX ORDER — ROSUVASTATIN CALCIUM 5 MG/1
5 TABLET, COATED ORAL DAILY
Qty: 90 TABLET | Refills: 1 | Status: SHIPPED | OUTPATIENT
Start: 2024-10-04

## 2024-10-17 ENCOUNTER — APPOINTMENT (OUTPATIENT)
Dept: PRIMARY CARE | Facility: CLINIC | Age: 64
End: 2024-10-17
Payer: MEDICARE

## 2024-10-17 DIAGNOSIS — Z23 NEED FOR VACCINATION: ICD-10-CM

## 2024-10-17 PROCEDURE — 90480 ADMN SARSCOV2 VAC 1/ONLY CMP: CPT | Performed by: FAMILY MEDICINE

## 2024-10-17 PROCEDURE — 90662 IIV NO PRSV INCREASED AG IM: CPT | Performed by: FAMILY MEDICINE

## 2024-10-17 PROCEDURE — 91322 SARSCOV2 VAC 50 MCG/0.5ML IM: CPT | Performed by: FAMILY MEDICINE

## 2024-10-17 PROCEDURE — G0008 ADMIN INFLUENZA VIRUS VAC: HCPCS | Performed by: FAMILY MEDICINE

## 2024-10-17 NOTE — PROGRESS NOTES
Vandana Cramer is a 64 y.o. female patient, here today for a seasonal flu shot. Patient has answered all screening questions and is eligible for the influenza vaccine. Jennifer Agudelo MD has signed orders for this visit. VIS given. Questions answered. Admin right deltoid. Patient tolerated well.   AND  Vandana Cramer here today for [id number] dose of CoV-19 vaccine, Moderna BiValent. Questionnaire completed. CoV Screening questions answered. Administered in left Deltoid. Patient tolerated well.

## 2024-11-11 ENCOUNTER — PHARMACY VISIT (OUTPATIENT)
Dept: PHARMACY | Facility: CLINIC | Age: 64
End: 2024-11-11
Payer: COMMERCIAL

## 2024-11-11 PROCEDURE — RXMED WILLOW AMBULATORY MEDICATION CHARGE

## 2025-01-10 ENCOUNTER — APPOINTMENT (OUTPATIENT)
Dept: PRIMARY CARE | Facility: CLINIC | Age: 65
End: 2025-01-10
Payer: MEDICARE

## 2025-01-10 ENCOUNTER — PHARMACY VISIT (OUTPATIENT)
Dept: PHARMACY | Facility: CLINIC | Age: 65
End: 2025-01-10
Payer: COMMERCIAL

## 2025-01-10 ENCOUNTER — LAB (OUTPATIENT)
Dept: LAB | Facility: LAB | Age: 65
End: 2025-01-10
Payer: MEDICARE

## 2025-01-10 VITALS
TEMPERATURE: 97 F | OXYGEN SATURATION: 97 % | WEIGHT: 219 LBS | HEIGHT: 69 IN | HEART RATE: 90 BPM | RESPIRATION RATE: 16 BRPM | BODY MASS INDEX: 32.44 KG/M2 | SYSTOLIC BLOOD PRESSURE: 122 MMHG | DIASTOLIC BLOOD PRESSURE: 74 MMHG

## 2025-01-10 DIAGNOSIS — E78.2 MIXED HYPERLIPIDEMIA: ICD-10-CM

## 2025-01-10 DIAGNOSIS — R63.5 WEIGHT GAIN: ICD-10-CM

## 2025-01-10 DIAGNOSIS — Z13.89 MULTIPHASIC SCREENING: Primary | ICD-10-CM

## 2025-01-10 DIAGNOSIS — R73.9 HYPERGLYCEMIA: ICD-10-CM

## 2025-01-10 DIAGNOSIS — Z12.31 ENCOUNTER FOR SCREENING MAMMOGRAM FOR MALIGNANT NEOPLASM OF BREAST: ICD-10-CM

## 2025-01-10 DIAGNOSIS — R05.8 OTHER COUGH: ICD-10-CM

## 2025-01-10 DIAGNOSIS — Z12.11 COLON CANCER SCREENING: ICD-10-CM

## 2025-01-10 DIAGNOSIS — Z13.89 MULTIPHASIC SCREENING: ICD-10-CM

## 2025-01-10 DIAGNOSIS — Z00.00 ENCOUNTER FOR MEDICARE ANNUAL WELLNESS EXAM: ICD-10-CM

## 2025-01-10 LAB
ALBUMIN SERPL BCP-MCNC: 4.6 G/DL (ref 3.4–5)
ALP SERPL-CCNC: 68 U/L (ref 33–136)
ALT SERPL W P-5'-P-CCNC: 13 U/L (ref 7–45)
ANION GAP SERPL CALC-SCNC: 10 MMOL/L (ref 10–20)
AST SERPL W P-5'-P-CCNC: 17 U/L (ref 9–39)
BASOPHILS # BLD AUTO: 0.06 X10*3/UL (ref 0–0.1)
BASOPHILS NFR BLD AUTO: 1 %
BILIRUB SERPL-MCNC: 0.6 MG/DL (ref 0–1.2)
BUN SERPL-MCNC: 19 MG/DL (ref 6–23)
CALCIUM SERPL-MCNC: 10.6 MG/DL (ref 8.6–10.3)
CHLORIDE SERPL-SCNC: 107 MMOL/L (ref 98–107)
CHOLEST SERPL-MCNC: 152 MG/DL (ref 0–199)
CHOLESTEROL/HDL RATIO: 2.5
CO2 SERPL-SCNC: 28 MMOL/L (ref 21–32)
CREAT SERPL-MCNC: 0.97 MG/DL (ref 0.5–1.05)
EGFRCR SERPLBLD CKD-EPI 2021: 65 ML/MIN/1.73M*2
EOSINOPHIL # BLD AUTO: 0.14 X10*3/UL (ref 0–0.7)
EOSINOPHIL NFR BLD AUTO: 2.3 %
ERYTHROCYTE [DISTWIDTH] IN BLOOD BY AUTOMATED COUNT: 13.3 % (ref 11.5–14.5)
EST. AVERAGE GLUCOSE BLD GHB EST-MCNC: 111 MG/DL
GLUCOSE SERPL-MCNC: 81 MG/DL (ref 74–99)
HBA1C MFR BLD: 5.5 %
HCT VFR BLD AUTO: 40.8 % (ref 36–46)
HDLC SERPL-MCNC: 61.3 MG/DL
HGB BLD-MCNC: 13.5 G/DL (ref 12–16)
IMM GRANULOCYTES # BLD AUTO: 0.01 X10*3/UL (ref 0–0.7)
IMM GRANULOCYTES NFR BLD AUTO: 0.2 % (ref 0–0.9)
LDLC SERPL CALC-MCNC: 65 MG/DL
LYMPHOCYTES # BLD AUTO: 2.02 X10*3/UL (ref 1.2–4.8)
LYMPHOCYTES NFR BLD AUTO: 33.2 %
MCH RBC QN AUTO: 30.4 PG (ref 26–34)
MCHC RBC AUTO-ENTMCNC: 33.1 G/DL (ref 32–36)
MCV RBC AUTO: 92 FL (ref 80–100)
MONOCYTES # BLD AUTO: 0.4 X10*3/UL (ref 0.1–1)
MONOCYTES NFR BLD AUTO: 6.6 %
NEUTROPHILS # BLD AUTO: 3.45 X10*3/UL (ref 1.2–7.7)
NEUTROPHILS NFR BLD AUTO: 56.7 %
NON HDL CHOLESTEROL: 91 MG/DL (ref 0–149)
NRBC BLD-RTO: 0 /100 WBCS (ref 0–0)
PLATELET # BLD AUTO: 259 X10*3/UL (ref 150–450)
POTASSIUM SERPL-SCNC: 4.1 MMOL/L (ref 3.5–5.3)
PROT SERPL-MCNC: 7.3 G/DL (ref 6.4–8.2)
RBC # BLD AUTO: 4.44 X10*6/UL (ref 4–5.2)
SODIUM SERPL-SCNC: 141 MMOL/L (ref 136–145)
T4 FREE SERPL-MCNC: 0.89 NG/DL (ref 0.61–1.12)
TRIGL SERPL-MCNC: 127 MG/DL (ref 0–149)
TSH SERPL-ACNC: 1.77 MIU/L (ref 0.44–3.98)
VLDL: 25 MG/DL (ref 0–40)
WBC # BLD AUTO: 6.1 X10*3/UL (ref 4.4–11.3)

## 2025-01-10 PROCEDURE — 85025 COMPLETE CBC W/AUTO DIFF WBC: CPT

## 2025-01-10 PROCEDURE — 3008F BODY MASS INDEX DOCD: CPT | Performed by: FAMILY MEDICINE

## 2025-01-10 PROCEDURE — 84439 ASSAY OF FREE THYROXINE: CPT

## 2025-01-10 PROCEDURE — 83036 HEMOGLOBIN GLYCOSYLATED A1C: CPT

## 2025-01-10 PROCEDURE — 1123F ACP DISCUSS/DSCN MKR DOCD: CPT | Performed by: FAMILY MEDICINE

## 2025-01-10 PROCEDURE — G0439 PPPS, SUBSEQ VISIT: HCPCS | Performed by: FAMILY MEDICINE

## 2025-01-10 PROCEDURE — 80061 LIPID PANEL: CPT

## 2025-01-10 PROCEDURE — 1159F MED LIST DOCD IN RCRD: CPT | Performed by: FAMILY MEDICINE

## 2025-01-10 PROCEDURE — RXMED WILLOW AMBULATORY MEDICATION CHARGE

## 2025-01-10 PROCEDURE — G0446 INTENS BEHAVE THER CARDIO DX: HCPCS | Performed by: FAMILY MEDICINE

## 2025-01-10 PROCEDURE — 1158F ADVNC CARE PLAN TLK DOCD: CPT | Performed by: FAMILY MEDICINE

## 2025-01-10 PROCEDURE — 80053 COMPREHEN METABOLIC PANEL: CPT

## 2025-01-10 PROCEDURE — 1036F TOBACCO NON-USER: CPT | Performed by: FAMILY MEDICINE

## 2025-01-10 PROCEDURE — 99397 PER PM REEVAL EST PAT 65+ YR: CPT | Performed by: FAMILY MEDICINE

## 2025-01-10 PROCEDURE — 84443 ASSAY THYROID STIM HORMONE: CPT

## 2025-01-10 PROCEDURE — 1170F FXNL STATUS ASSESSED: CPT | Performed by: FAMILY MEDICINE

## 2025-01-10 PROCEDURE — G0444 DEPRESSION SCREEN ANNUAL: HCPCS | Performed by: FAMILY MEDICINE

## 2025-01-10 PROCEDURE — 1160F RVW MEDS BY RX/DR IN RCRD: CPT | Performed by: FAMILY MEDICINE

## 2025-01-10 RX ORDER — BENZONATATE 200 MG/1
200 CAPSULE ORAL 3 TIMES DAILY PRN
Qty: 42 CAPSULE | Refills: 0 | Status: SHIPPED | OUTPATIENT
Start: 2025-01-10 | End: 2025-02-09

## 2025-01-10 RX ORDER — SEMAGLUTIDE 0.25 MG/.5ML
0.25 INJECTION, SOLUTION SUBCUTANEOUS WEEKLY
Qty: 2 ML | Refills: 0 | Status: SHIPPED | OUTPATIENT
Start: 2025-01-10 | End: 2025-02-07

## 2025-01-10 ASSESSMENT — ENCOUNTER SYMPTOMS
DEPRESSION: 0
OCCASIONAL FEELINGS OF UNSTEADINESS: 0
LOSS OF SENSATION IN FEET: 0

## 2025-01-10 ASSESSMENT — ACTIVITIES OF DAILY LIVING (ADL)
BATHING: INDEPENDENT
DRESSING: INDEPENDENT
GROCERY_SHOPPING: INDEPENDENT
MANAGING_FINANCES: INDEPENDENT
DOING_HOUSEWORK: INDEPENDENT
TAKING_MEDICATION: INDEPENDENT

## 2025-01-10 NOTE — PROGRESS NOTES
Subjective   Reason for Visit: Vandana Cramer is a 64 y.o. female here for a Medicare Wellness visit.   Covid vax: UTD  Flu: UTD  Pneumo: UTD  RSV: UTD  Shingles: UTD     CRC: due--ordered  Mammogram: 1/2024-ordered  Pap: 12/2020  Lmp: ablation    CHECKLIST REVIEWED AND COMPLETE FOR AMW  Medicare Annual Wellness Visit Subsequent, Hyperlipidemia, and Weight Management  ------  Covid vax: UTD  Flu: UTD  Pneumo: UTD  RSV: UTD  Shingles: UTD     CRC: due--ordered  Mammogram: 1/2024-ordered  Pap: 12/2020  Lmp: ablation---------------------------------------------------------------------------------------  Past Medical, Surgical, and Family History reviewed and updated in chart.    Reviewed all medications by prescribing practitioner or clinical pharmacist (such as prescriptions, OTCs, herbal therapies and supplements) and documented in the medical record.    HPI    Patient Self Assessment of Health Status  Patient Self Assessment: Good    Nutrition and Exercise:    Current Diet: HEALTHY Diet always best, minimizing excess carbs,   weight reduction advised if BMI not WNL, please maintain a NORMAL BMI 18.5-24.9    Adequate Fluid Intake: Yes  Caffeine: -aware to minimize intake, <300mg best to even take in LESS  Exercise Frequency: Regularly advised, weight bearing, strengthening, aerobic    Functional Ability/Level of Safety:  Home safety addressed: no active new concerns,   fall risk addressed  NO new hearing issues or concerns  [place] in ADLs addressed and areas of assistance if present -noted    ANY Cognitive Impairment Observed: No cognitive impairment observed    Home Safety Risk Factors: None  --------------------------------------------------------------------------------------------  Patient Care Team:  Jennifer Agudelo MD as PCP - General    HPI  Patient Active Problem List   Diagnosis    Allergic rhinitis    Colon polyps    Arthralgia    Hyperglycemia    Mixed hyperlipidemia    Seasonal allergies     "Shoulder pain, bilateral    Tinnitus    Class 1 obesity with body mass index (BMI) of 30.0 to 30.9 in adult    Other unilateral secondary osteoarthritis of knee    Agatston coronary artery calcium score less than 100    Need for vaccination      Past Surgical History:   Procedure Laterality Date    BREAST BIOPSY Left     Left core bx benign 2011    COLONOSCOPY W/ POLYPECTOMY  2017 1 polyp-due 2022    ENDOMETRIAL ABLATION  2009    SHOULDER SURGERY Left 2023    bone chip, spur, tear         PHQ2(-) No active depressed mood or not in crisis, 5min. spent in discussion.    Review of Systems:    NO Seizures  NO CAD  NO CVA    This patient has   NO history of recent Covid nor flu symptoms,  NO Fever nor chills,  NO Chest pain, shortness of breath nor paroxysmal nocturnal dyspnea,  NO Nausea, vomiting, nor diarrhea,  NO Hematochezia nor melena,  NO Dysuria, hematuria, nor new incontinence issues  NO new severe headaches nor neurological complaints,  NO new issues with anxiety nor depression nor new psychiatric complaints,  NO suicidal nor homicidal ideations.  ---------------------------------------------------------------------------------------------   OBJECTIVE:  /74   Pulse 90   Temp 36.1 °C (97 °F) (Temporal)   Resp 16   Ht 1.753 m (5' 9\")   Wt 99.3 kg (219 lb)   SpO2 97%   BMI 32.34 kg/m²      General:  alert, oriented, no acute distress.  No obvious skin rashes noted.   No gait disturbance noted.    Mood is pleasant, not tearful, no signs of emotional distress.  Not appearing intoxicated or altered.   No voiced delusions,   Normal, appropriate behavior.    HEENT: Normocephalic, atraumatic,   Pupils round, reactive to light  Extraocular motions intact and wnl  Tympanic membranes normal    Neck: no nuchal rigidity  No masses palpable.  No carotid bruits.  No thyromegaly.    Respiratory: Equal breath sounds  No wheezes,    rales,    nor rhonchi  No respiratory distress.    Heart: Regular rate and rhythm, " no    murmurs  no rubs/gallops    Abdomen: no masses palpable, no rebound nor guarding, no rebound nor guarding.    Extremities: NO cyanosis noted, no clubbing.   No edema noted.  2+dorsalis pedis pulses.    Normal-not antalgic, steady gait.    No visits with results within 3 Month(s) from this visit.   Latest known visit with results is:   Lab on 04/05/2024   Component Date Value Ref Range Status    Glucose 04/05/2024 88  74 - 99 mg/dL Final    Sodium 04/05/2024 142  136 - 145 mmol/L Final    Potassium 04/05/2024 4.0  3.5 - 5.3 mmol/L Final    Chloride 04/05/2024 108 (H)  98 - 107 mmol/L Final    Bicarbonate 04/05/2024 28  21 - 32 mmol/L Final    Anion Gap 04/05/2024 10  10 - 20 mmol/L Final    Urea Nitrogen 04/05/2024 22  6 - 23 mg/dL Final    Creatinine 04/05/2024 0.94  0.50 - 1.05 mg/dL Final    eGFR 04/05/2024 67  >60 mL/min/1.73m*2 Final    Calculations of estimated GFR are performed using the 2021 CKD-EPI Study Refit equation without the race variable for the IDMS-Traceable creatinine methods.  https://jasn.asnjournals.org/content/early/2021/09/22/ASN.0774851767    Calcium 04/05/2024 9.8  8.6 - 10.3 mg/dL Final    Albumin 04/05/2024 4.4  3.4 - 5.0 g/dL Final    Alkaline Phosphatase 04/05/2024 73  33 - 136 U/L Final    Total Protein 04/05/2024 7.3  6.4 - 8.2 g/dL Final    AST 04/05/2024 19  9 - 39 U/L Final    Bilirubin, Total 04/05/2024 0.5  0.0 - 1.2 mg/dL Final    ALT 04/05/2024 15  7 - 45 U/L Final    Patients treated with Sulfasalazine may generate falsely decreased results for ALT.    Cholesterol 04/05/2024 154  0 - 199 mg/dL Final          Age      Desirable   Borderline High   High     0-19 Y     0 - 169       170 - 199     >/= 200    20-24 Y     0 - 189       190 - 224     >/= 225         >24 Y     0 - 199       200 - 239     >/= 240   **All ranges are based on fasting samples. Specific   therapeutic targets will vary based on patient-specific   cardiac risk.    Pediatric guidelines  reference:Pediatrics 2011, 128(S5).Adult guidelines reference: NCEP ATPIII Guidelines,SCOTT 2001, 258:2486-97    Venipuncture immediately after or during the administration of Metamizole may lead to falsely low results. Testing should be performed immediately prior to Metamizole dosing.    HDL-Cholesterol 04/05/2024 61.8  mg/dL Final      Age       Very Low   Low     Normal    High    0-19 Y    < 35      < 40     40-45     ----  20-24 Y    ----     < 40      >45      ----        >24 Y      ----     < 40     40-60      >60      Cholesterol/HDL Ratio 04/05/2024 2.5   Final      Ref Values  Desirable  < 3.4  High Risk  > 5.0    LDL Calculated 04/05/2024 78  <=99 mg/dL Final                                Near   Borderline      AGE      Desirable  Optimal    High     High     Very High     0-19 Y     0 - 109     ---    110-129   >/= 130     ----    20-24 Y     0 - 119     ---    120-159   >/= 160     ----      >24 Y     0 -  99   100-129  130-159   160-189     >/=190      VLDL 04/05/2024 15  0 - 40 mg/dL Final    Triglycerides 04/05/2024 73  0 - 149 mg/dL Final       Age         Desirable   Borderline High   High     Very High   0 D-90 D    19 - 174         ----         ----        ----  91 D- 9 Y     0 -  74        75 -  99     >/= 100      ----    10-19 Y     0 -  89        90 - 129     >/= 130      ----    20-24 Y     0 - 114       115 - 149     >/= 150      ----         >24 Y     0 - 149       150 - 199    200- 499    >/= 500    Venipuncture immediately after or during the administration of Metamizole may lead to falsely low results. Testing should be performed immediately prior to Metamizole dosing.    Non HDL Cholesterol 04/05/2024 92  0 - 149 mg/dL Final          Age       Desirable   Borderline High   High     Very High     0-19 Y     0 - 119       120 - 144     >/= 145    >/= 160    20-24 Y     0 - 149       150 - 189     >/= 190      ----         >24 Y    30 mg/dL above LDL Cholesterol goal      Hemoglobin A1C  04/05/2024 5.5  see below % Final    Estimated Average Glucose 04/05/2024 111  Not Established mg/dL Final        Assessment/Plan     Problem List Items Addressed This Visit       Hyperglycemia    Relevant Orders    Comprehensive Metabolic Panel    CBC and Auto Differential    Hemoglobin A1C    Lipid Panel    Thyroid Stimulating Hormone    Thyroxine, Free    Mixed hyperlipidemia    Relevant Orders    Comprehensive Metabolic Panel    CBC and Auto Differential    Hemoglobin A1C    Lipid Panel    Thyroid Stimulating Hormone    Thyroxine, Free     Other Visit Diagnoses       Multiphasic screening    -  Primary    Relevant Orders    Thyroid Stimulating Hormone    Thyroxine, Free    Encounter for Medicare annual wellness exam        Relevant Orders    Comprehensive Metabolic Panel    CBC and Auto Differential    Hemoglobin A1C    Lipid Panel    Thyroid Stimulating Hormone    Thyroxine, Free    Encounter for screening mammogram for malignant neoplasm of breast        Relevant Orders    BI mammo bilateral screening tomosynthesis    Comprehensive Metabolic Panel    CBC and Auto Differential    Hemoglobin A1C    Lipid Panel    Thyroid Stimulating Hormone    Thyroxine, Free    Colon cancer screening        Relevant Orders    Colonoscopy Screening; High Risk Patient    Comprehensive Metabolic Panel    CBC and Auto Differential    Hemoglobin A1C    Lipid Panel    Thyroid Stimulating Hormone    Thyroxine, Free    Weight gain        Relevant Orders    Thyroid Stimulating Hormone    Thyroxine, Free          Advance Care Planning Note   Discussion Date: 01/10/25   Discussion Participants: patient  16 min spent discussing ACP w pt    The patient wishes to discuss Advance Care Planning today and the following is a brief summary of our discussion.     Patient has capacity to make their own medical decisions: Yes  Health Care Agent/Surrogate Decision Maker documented in chart: Yes    Documents on file and valid:  Advance  Directive/Living Will: Yes will bring  Health Care Power of : Yes   Communication of Medical Status/Prognosis:   yes   Communication of Treatment Goals/Options:   yes  Treatment Decisions/involved with patient today  yes  Time Statement: Total face to face time spent on advance care planning was <30 minutes with <30 minutes spent in counseling, including the explanation.    SEE ME AT NEXT REGULARLY SCHEDULED VISIT-sooner if condition deteriorates or new problems arise.    PATIENT WOULD LIKE TO BE A FULL CODE    NO uncontrolled DEPRESSION noted  Assessed and reviewed for opioid use.  NO EVIDENCE OF SIGNIFICANT DEMENTIA    Signs and symptoms of concern with depression-if in crisis -(no current HI/SI) will let us know and proceed to ER   Signs/symptoms of concern with dementia-and need to contact us if they occur discussed.    ASCVD  4.5 %   addressed and risk reduction conversation took place  Bmi addressed  On statin rba addressed  Ascvd risk reduction addressed  Ccs 42-not zero -and seeing [facility] was advised and completed  Additional study determined not to be needed at the [facility] visit    Mammogram due  Diet-exercise counseling    All above counseling 15 minutes in conversation/documentation etc    Mood counseling 5min- no uncontrolled depression, good support system, has crisis plan if occurs.  Included BMI counseling and options if BMI>30  Awakens in night and cannot sleep again  Cbd helped  Could try melatonin  QUESTIONS answered  Rba of each addressed  Wegovy This medications risks, benefits, and alternatives were discussed with patient at length.  If any unwanted side effects occur-discontinue medicine and call the office for discussion.  Reqs tessalon prn This medications risks, benefits, and alternatives were discussed with patient at length.  If any unwanted side effects occur-discontinue medicine and call the office for discussion.    Labs in july    Next visit addressed -regular visit as scheduled and  follow up sooner if condition deterioration or new problems arise.    This completes Vandana Cramer ANNUAL MEDICARE WELLNESS VISIT today.  If no other follow ups discussed: Please follow up in 1 year for NEXT ANNUAL MEDICARE WELLNESS VISIT.    Jennifer Agudelo MD    This documentation is subject to inadvertent typing and other similar clerical/grammatic errors etc.

## 2025-01-10 NOTE — PROGRESS NOTES
Covid vax: UTD  Flu: UTD  Pneumo: UTD  RSV: UTD  Shingles: UTD    CRC: due--ordered  Mammogram: 1/2024-ordered  Pap: 12/2020  Lmp: ablation

## 2025-01-15 ENCOUNTER — PHARMACY VISIT (OUTPATIENT)
Dept: PHARMACY | Facility: CLINIC | Age: 65
End: 2025-01-15
Payer: COMMERCIAL

## 2025-01-15 PROCEDURE — RXMED WILLOW AMBULATORY MEDICATION CHARGE

## 2025-01-16 DIAGNOSIS — E66.811 CLASS 1 OBESITY DUE TO EXCESS CALORIES WITHOUT SERIOUS COMORBIDITY WITH BODY MASS INDEX (BMI) OF 30.0 TO 30.9 IN ADULT: Primary | ICD-10-CM

## 2025-01-16 DIAGNOSIS — E66.09 CLASS 1 OBESITY DUE TO EXCESS CALORIES WITHOUT SERIOUS COMORBIDITY WITH BODY MASS INDEX (BMI) OF 30.0 TO 30.9 IN ADULT: Primary | ICD-10-CM

## 2025-01-22 ENCOUNTER — HOSPITAL ENCOUNTER (OUTPATIENT)
Dept: RADIOLOGY | Facility: HOSPITAL | Age: 65
Discharge: HOME | End: 2025-01-22
Payer: MEDICARE

## 2025-01-22 ENCOUNTER — APPOINTMENT (OUTPATIENT)
Dept: PHARMACY | Facility: HOSPITAL | Age: 65
End: 2025-01-22
Payer: MEDICARE

## 2025-01-22 VITALS — BODY MASS INDEX: 32.44 KG/M2 | HEIGHT: 69 IN | WEIGHT: 219 LBS

## 2025-01-22 DIAGNOSIS — R63.5 WEIGHT GAIN: ICD-10-CM

## 2025-01-22 DIAGNOSIS — E66.09 CLASS 1 OBESITY DUE TO EXCESS CALORIES WITHOUT SERIOUS COMORBIDITY WITH BODY MASS INDEX (BMI) OF 30.0 TO 30.9 IN ADULT: ICD-10-CM

## 2025-01-22 DIAGNOSIS — Z12.31 ENCOUNTER FOR SCREENING MAMMOGRAM FOR MALIGNANT NEOPLASM OF BREAST: ICD-10-CM

## 2025-01-22 DIAGNOSIS — E66.811 CLASS 1 OBESITY DUE TO EXCESS CALORIES WITHOUT SERIOUS COMORBIDITY WITH BODY MASS INDEX (BMI) OF 30.0 TO 30.9 IN ADULT: ICD-10-CM

## 2025-01-22 PROCEDURE — 77067 SCR MAMMO BI INCL CAD: CPT

## 2025-01-22 PROCEDURE — 77063 BREAST TOMOSYNTHESIS BI: CPT | Performed by: RADIOLOGY

## 2025-01-22 PROCEDURE — 77067 SCR MAMMO BI INCL CAD: CPT | Performed by: RADIOLOGY

## 2025-01-22 RX ORDER — SEMAGLUTIDE 0.25 MG/.5ML
0.25 INJECTION, SOLUTION SUBCUTANEOUS WEEKLY
Qty: 2 ML | Refills: 0 | Status: SHIPPED | OUTPATIENT
Start: 2025-01-22 | End: 2025-02-13

## 2025-01-22 NOTE — PROGRESS NOTES
Clinical Pharmacy Appointment    Patient ID: Vandana Cramer is a 64 y.o. female who presents for Weight Loss.    Pt is here for First appointment.     Referring Provider: Jennifer Agudelo, *  PCP: Jennifer Agudelo MD   Last visit with PCP: 1.10.2025   Next visit with PCP: 7.11.2025    Subjective   HPI  WEIGHT LOSS  BMI Readings from Last 3 Encounters:   01/22/25 32.34 kg/m²   01/10/25 32.34 kg/m²   07/09/24 32.05 kg/m²      Starting weight: 219 lbs. (1.22.2025)  Current weight: 219 lbs.    Lifestyle  Diet:   Patient reports does try to endorse in healthy food choices  Physical Activity:   Patient is active  Does weight training    Non-Pharmacological Therapy  Weight loss techniques attempted:  Self-directed dieting:      Pharmacological Therapy  Current Medications: N/a  Adverse Effects: N/a    Insurance coverage of weight-loss medications? No, plan exclusion  Eligible for copay cards/programs? No, government funded insurance    Drug Interactions  No relevant drug interactions were noted.    Objective   Allergies   Allergen Reactions    Meloxicam Other     Elevates blood pressure    Meperidine Unknown    Metformin Unknown    Shellfish Derived Unknown     Social History     Social History Narrative    Not on file      Medication Review  Current Outpatient Medications   Medication Instructions    benzonatate (TESSALON) 200 mg, oral, 3 times daily PRN, Do not crush or chew.    mometasone (Elocon) 0.1 % ointment Apply topically twice daily for 2 weeks    rosuvastatin (CRESTOR) 5 mg, oral, Daily    Wegovy 0.25 mg, subcutaneous, Weekly      Vitals  BP Readings from Last 2 Encounters:   01/10/25 122/74   07/09/24 118/70     BMI Readings from Last 1 Encounters:   01/22/25 32.34 kg/m²      Labs  A1C  Lab Results   Component Value Date    HGBA1C 5.5 01/10/2025    HGBA1C 5.5 04/05/2024    HGBA1C 5.6 01/10/2024     BMP  Lab Results   Component Value Date    CALCIUM 10.6 (H) 01/10/2025     01/10/2025    K 4.1  "01/10/2025    CO2 28 01/10/2025     01/10/2025    BUN 19 01/10/2025    CREATININE 0.97 01/10/2025    EGFR 65 01/10/2025     LFTs  Lab Results   Component Value Date    ALT 13 01/10/2025    AST 17 01/10/2025    ALKPHOS 68 01/10/2025    BILITOT 0.6 01/10/2025     FLP  Lab Results   Component Value Date    TRIG 127 01/10/2025    CHOL 152 01/10/2025    LDLF 129 (H) 01/31/2023    LDLCALC 65 01/10/2025    HDL 61.3 01/10/2025     Urine Microalbumin  No results found for: \"MICROALBCREA\"  Weight Management  Wt Readings from Last 3 Encounters:   01/22/25 99.3 kg (219 lb)   01/10/25 99.3 kg (219 lb)   07/09/24 98.4 kg (217 lb)      There is no height or weight on file to calculate BMI.     Assessment/Plan   Problem List Items Addressed This Visit       Class 1 obesity with body mass index (BMI) of 30.0 to 30.9 in adult     Spoke with patient today regarding [facility] pharmacy for weight loss medications. Explained to patient that these are not FDA approved and are not given at approved dosing, therefore it cannot be guaranteed that the medication will have the same effectiveness and safety that prescription Ozempic/Wegovy/Mounjaro/Zepbound do. Patient agreeable to proceed with semaglutide. Counseled patient on MOA, expectations, side effects, duration of therapy, contraindications, administration, and monitoring parameters. Reviewed no history of MTC nor pancreatitis. Answered all patient questions and concerns; provided [facility] phone number if issues/questions arise.     Will send prescription to [facility] pharmacy today for semaglutide 0.3 mg. Will plan to follow-up in 4 weeks.          Other Visit Diagnoses       Weight gain        BMI 32.0-32.9,adult              Clinical Pharmacist follow-up: 2/19/2025 @ 10 am, Telehealth visit    Continue all meds under the continuation of care with the referring provider and clinical pharmacy team.    Thank you,  Kristy Pop, PharmD  Clinical " Pharmacist  [phone]    Verbal consent to manage patient's drug therapy was obtained from the patient. They were informed they may decline to participate or withdraw from participation in pharmacy services at any time.

## 2025-01-22 NOTE — ASSESSMENT & PLAN NOTE
Spoke with patient today regarding [facility] pharmacy for weight loss medications. Explained to patient that these are not FDA approved and are not given at approved dosing, therefore it cannot be guaranteed that the medication will have the same effectiveness and safety that prescription Ozempic/Wegovy/Mounjaro/Zepbound do. Patient agreeable to proceed with semaglutide. Counseled patient on MOA, expectations, side effects, duration of therapy, contraindications, administration, and monitoring parameters. Reviewed no history of MTC nor pancreatitis. Answered all patient questions and concerns; provided [facility] phone number if issues/questions arise.     Will send prescription to [facility] pharmacy today for semaglutide 0.3 mg. Will plan to follow-up in 4 weeks.

## 2025-01-31 ENCOUNTER — PHARMACY VISIT (OUTPATIENT)
Dept: PHARMACY | Facility: CLINIC | Age: 65
End: 2025-01-31
Payer: COMMERCIAL

## 2025-01-31 ENCOUNTER — OFFICE VISIT (OUTPATIENT)
Dept: URGENT CARE | Age: 65
End: 2025-01-31
Payer: MEDICARE

## 2025-01-31 VITALS
TEMPERATURE: 97.4 F | OXYGEN SATURATION: 95 % | HEART RATE: 86 BPM | BODY MASS INDEX: 32.44 KG/M2 | SYSTOLIC BLOOD PRESSURE: 148 MMHG | RESPIRATION RATE: 18 BRPM | HEIGHT: 69 IN | WEIGHT: 219 LBS | DIASTOLIC BLOOD PRESSURE: 84 MMHG

## 2025-01-31 DIAGNOSIS — J01.00 ACUTE MAXILLARY SINUSITIS, RECURRENCE NOT SPECIFIED: Primary | ICD-10-CM

## 2025-01-31 PROCEDURE — RXMED WILLOW AMBULATORY MEDICATION CHARGE

## 2025-01-31 RX ORDER — BROMPHENIRAMINE MALEATE, PSEUDOEPHEDRINE HYDROCHLORIDE, AND DEXTROMETHORPHAN HYDROBROMIDE 2; 30; 10 MG/5ML; MG/5ML; MG/5ML
5 SYRUP ORAL EVERY 4 HOURS PRN
Qty: 120 ML | Refills: 0 | Status: SHIPPED | OUTPATIENT
Start: 2025-01-31

## 2025-01-31 RX ORDER — PREDNISONE 10 MG/1
10 TABLET ORAL
Qty: 6 TABLET | Refills: 0 | Status: SHIPPED | OUTPATIENT
Start: 2025-01-31 | End: 2025-02-03

## 2025-01-31 RX ORDER — AMOXICILLIN AND CLAVULANATE POTASSIUM 875; 125 MG/1; MG/1
1 TABLET, FILM COATED ORAL 2 TIMES DAILY
Qty: 14 TABLET | Refills: 0 | Status: SHIPPED | OUTPATIENT
Start: 2025-01-31 | End: 2025-02-07

## 2025-01-31 ASSESSMENT — PATIENT HEALTH QUESTIONNAIRE - PHQ9
2. FEELING DOWN, DEPRESSED OR HOPELESS: NOT AT ALL
1. LITTLE INTEREST OR PLEASURE IN DOING THINGS: NOT AT ALL
SUM OF ALL RESPONSES TO PHQ9 QUESTIONS 1 & 2: 0

## 2025-01-31 ASSESSMENT — ENCOUNTER SYMPTOMS: DEPRESSION: 0

## 2025-01-31 NOTE — PROGRESS NOTES
"Subjective   Patient ID: Vandana Cramer is a 64 y.o. female who presents for Cough (Coughing up green/yellow mucus /On Benzonatate /Started 1 week ago ) and Earache.  HPI  Patient presents for evaluation of sinus pressure. Patient reports 1 week of progressively worsening maxillary sinus pain, nasal congestion, cough, and headache. There is reported mild postnasal drip and ear pressure.  Patient states that the cough has resolved but sinus pressure and ear pain persist.  No fever, nausea, vomiting, or other constitutional signs and symptoms. Symptoms have been refractory to over-the-counter medications.    Review of Systems    Constitutional:  See HPI   ENT: See HPI  Respiratory: See HPI  Neurologic:  Alert and oriented X4, No numbness, No tingling.    All other systems are negative     Objective     /84 (BP Location: Right arm, Patient Position: Sitting)   Pulse 86   Temp 36.3 °C (97.4 °F)   Resp 18   Ht 1.753 m (5' 9\")   Wt 99.3 kg (219 lb)   LMP  (LMP Unknown)   SpO2 95%   BMI 32.34 kg/m²     Physical Exam    General:  Alert and oriented, No acute distress.    Eye:  Pupils are equal, round and reactive to light, Normal conjunctiva.    HENT:  Normocephalic, mild oropharyngeal erythema without edema or exudate; no cervical adenopathy; bilateral tympanic membranes and canals are unremarkable; right-sided maxillary sinus tenderness; nasal congestion noted  Neck:  Supple    Respiratory: Respirations are non-labored   Musculoskeletal: Normal ROM and strength  Integumentary:  Warm, Dry, Intact, No pallor, No rash.    Neurologic:  Alert, Oriented, Normal sensory, Cranial Nerves II-XII are grossly intact  Psychiatric:  Cooperative, Appropriate mood & affect.    Assessment/Plan   Exam is consistent with sinusitis.  Prescriptions for Augmentin, low-dose prednisone, and Bromfed.  Patient's clinical presentation is otherwise unremarkable at this time. Patient is discharged with instructions to follow-up with " primary care or seek emergency medical attention for worsening symptoms or any new concerns.  Problem List Items Addressed This Visit    None  Visit Diagnoses       Acute maxillary sinusitis, recurrence not specified    -  Primary    Relevant Medications    amoxicillin-pot clavulanate (Augmentin) 875-125 mg tablet    brompheniramine-pseudoeph-DM (Bromfed DM) 2-30-10 mg/5 mL syrup    predniSONE (Deltasone) 10 mg tablet            Final diagnoses:   [J01.00] Acute maxillary sinusitis, recurrence not specified

## 2025-02-19 ENCOUNTER — APPOINTMENT (OUTPATIENT)
Dept: PHARMACY | Facility: HOSPITAL | Age: 65
End: 2025-02-19
Payer: MEDICARE

## 2025-02-19 DIAGNOSIS — R63.5 WEIGHT GAIN: ICD-10-CM

## 2025-02-19 DIAGNOSIS — E66.811 CLASS 1 OBESITY DUE TO EXCESS CALORIES WITHOUT SERIOUS COMORBIDITY WITH BODY MASS INDEX (BMI) OF 30.0 TO 30.9 IN ADULT: ICD-10-CM

## 2025-02-19 DIAGNOSIS — E66.09 CLASS 1 OBESITY DUE TO EXCESS CALORIES WITHOUT SERIOUS COMORBIDITY WITH BODY MASS INDEX (BMI) OF 30.0 TO 30.9 IN ADULT: ICD-10-CM

## 2025-02-19 RX ORDER — SEMAGLUTIDE 0.25 MG/.5ML
0.25 INJECTION, SOLUTION SUBCUTANEOUS WEEKLY
Qty: 2 ML | Refills: 0 | Status: SHIPPED | OUTPATIENT
Start: 2025-02-19 | End: 2025-03-13

## 2025-02-19 NOTE — ASSESSMENT & PLAN NOTE
Spoke with patient today regarding [facility] pharmacy for weight loss medications. Explained to patient that these are not FDA approved and are not given at approved dosing, therefore it cannot be guaranteed that the medication will have the same effectiveness and safety that prescription Ozempic/Wegovy/Mounjaro/Zepbound do. Patient agreeable to increase semaglutide today to reach maintenance dose. Counseled patient on MOA, expectations, side effects, duration of therapy, contraindications, administration, and monitoring parameters. Reviewed no history of MTC nor pancreatitis. Answered all patient questions and concerns; provided [facility] phone number if issues/questions arise.     Will send prescription to [facility] pharmacy today for semaglutide 0.6 mg weekly. Will plan to follow-up in 4 weeks.

## 2025-02-19 NOTE — PROGRESS NOTES
"  Clinical Pharmacy Appointment    Patient ID: Vandana Cramer is a 64 y.o. female who presents for Weight Loss.    Pt is here for Follow Up appointment.     Referring Provider: Jennifer Agudelo, *  PCP: Jennifer Agudelo MD   Last visit with PCP: 1.10.2025   Next visit with PCP: 7.11.2025    Subjective   Interval History:  Started semaglutide 0.3 mg weekly  Feeling roth longer after eating, and not thinking about food as much  Has lost 4-5 pounds in first week, then has stalled  Denies side effects  \"aversion\" to food  No dry mouth    HPI  WEIGHT LOSS  BMI Readings from Last 3 Encounters:   01/31/25 32.34 kg/m²   01/22/25 32.34 kg/m²   01/10/25 32.34 kg/m²      Starting weight: 219 lbs. (1.22.2025)  Current weight: ~215 lbs. (2.19.2025)    Lifestyle  Diet:   Patient reports does try to endorse in healthy food choices  Physical Activity:   Patient is active  Does weight training    Non-Pharmacological Therapy  Weight loss techniques attempted:  Self-directed dieting:      Pharmacological Therapy  Current Medications: semaglutide 0.3 mg subcutaneously once weekly on Tuesdays  Adverse Effects: Denies    Insurance coverage of weight-loss medications? No, plan exclusion  Eligible for copay cards/programs? No, government funded insurance    Drug Interactions  No relevant drug interactions were noted.    Objective   Allergies   Allergen Reactions    Meloxicam Other     Elevates blood pressure    Meperidine Unknown    Metformin Unknown    Shellfish Derived Unknown     Social History     Social History Narrative    Not on file      Medication Review  Current Outpatient Medications   Medication Instructions    brompheniramine-pseudoeph-DM (Bromfed DM) 2-30-10 mg/5 mL syrup 5 mL, oral, Every 4 hours PRN    mometasone (Elocon) 0.1 % ointment Apply topically twice daily for 2 weeks    rosuvastatin (CRESTOR) 5 mg, oral, Daily    Wegovy 0.25 mg, subcutaneous, Weekly, Inject 0.3 mg subcutaneously once weekly. Qty: 4 " "pens 0 refills      Vitals  BP Readings from Last 2 Encounters:   01/31/25 148/84   01/10/25 122/74     BMI Readings from Last 1 Encounters:   01/31/25 32.34 kg/m²      Labs  A1C  Lab Results   Component Value Date    HGBA1C 5.5 01/10/2025    HGBA1C 5.5 04/05/2024    HGBA1C 5.6 01/10/2024     BMP  Lab Results   Component Value Date    CALCIUM 10.6 (H) 01/10/2025     01/10/2025    K 4.1 01/10/2025    CO2 28 01/10/2025     01/10/2025    BUN 19 01/10/2025    CREATININE 0.97 01/10/2025    EGFR 65 01/10/2025     LFTs  Lab Results   Component Value Date    ALT 13 01/10/2025    AST 17 01/10/2025    ALKPHOS 68 01/10/2025    BILITOT 0.6 01/10/2025     FLP  Lab Results   Component Value Date    TRIG 127 01/10/2025    CHOL 152 01/10/2025    LDLF 129 (H) 01/31/2023    LDLCALC 65 01/10/2025    HDL 61.3 01/10/2025     Urine Microalbumin  No results found for: \"MICROALBCREA\"  Weight Management  Wt Readings from Last 3 Encounters:   01/31/25 99.3 kg (219 lb)   01/22/25 99.3 kg (219 lb)   01/10/25 99.3 kg (219 lb)      There is no height or weight on file to calculate BMI.     Assessment/Plan   Problem List Items Addressed This Visit       Class 1 obesity with body mass index (BMI) of 30.0 to 30.9 in adult     Spoke with patient today regarding [facility] pharmacy for weight loss medications. Explained to patient that these are not FDA approved and are not given at approved dosing, therefore it cannot be guaranteed that the medication will have the same effectiveness and safety that prescription Ozempic/Wegovy/Mounjaro/Zepbound do. Patient agreeable to increase semaglutide today to reach maintenance dose. Counseled patient on MOA, expectations, side effects, duration of therapy, contraindications, administration, and monitoring parameters. Reviewed no history of MTC nor pancreatitis. Answered all patient questions and concerns; provided [facility] phone number if issues/questions arise.     Will send prescription to " [facility] pharmacy today for semaglutide 0.6 mg weekly. Will plan to follow-up in 4 weeks.          Other Visit Diagnoses       Weight gain        BMI 32.0-32.9,adult              Clinical Pharmacist follow-up: 3/19/2025 @ 10 am, Telehealth visit    Continue all meds under the continuation of care with the referring provider and clinical pharmacy team.    Thank you,  Kristy Pop, PharmD  Clinical Pharmacist  [phone]    Verbal consent to manage patient's drug therapy was obtained from the patient. They were informed they may decline to participate or withdraw from participation in pharmacy services at any time.

## 2025-03-19 ENCOUNTER — APPOINTMENT (OUTPATIENT)
Dept: PHARMACY | Facility: HOSPITAL | Age: 65
End: 2025-03-19
Payer: MEDICARE

## 2025-03-19 DIAGNOSIS — E66.09 CLASS 1 OBESITY DUE TO EXCESS CALORIES WITHOUT SERIOUS COMORBIDITY WITH BODY MASS INDEX (BMI) OF 30.0 TO 30.9 IN ADULT: ICD-10-CM

## 2025-03-19 DIAGNOSIS — E66.811 CLASS 1 OBESITY DUE TO EXCESS CALORIES WITHOUT SERIOUS COMORBIDITY WITH BODY MASS INDEX (BMI) OF 30.0 TO 30.9 IN ADULT: ICD-10-CM

## 2025-03-19 DIAGNOSIS — R63.5 WEIGHT GAIN: ICD-10-CM

## 2025-03-19 RX ORDER — SEMAGLUTIDE 0.25 MG/.5ML
0.25 INJECTION, SOLUTION SUBCUTANEOUS WEEKLY
Qty: 2 ML | Refills: 0 | Status: SHIPPED | OUTPATIENT
Start: 2025-03-19 | End: 2025-04-10

## 2025-03-19 NOTE — ASSESSMENT & PLAN NOTE
Spoke with patient today regarding [facility] pharmacy for weight loss medications. Explained to patient that these are not FDA approved and are not given at approved dosing, therefore it cannot be guaranteed that the medication will have the same effectiveness and safety that prescription Ozempic/Wegovy/Mounjaro/Zepbound do. Patient agreeable to cotinue semaglutide today. Counseled patient on MOA, expectations, side effects, duration of therapy, contraindications, administration, and monitoring parameters. Reviewed no history of MTC nor pancreatitis. Answered all patient questions and concerns; provided [facility] phone number if issues/questions arise.    Advised may not be able to continue semaglutide after April 2025 due to semaglutide being removed from FDA drug shortage list. Discussion surrounding side effects to expect if medication discontinued.     Will send prescription to [facility] pharmacy today for semaglutide 0.6 mg weekly.

## 2025-03-19 NOTE — PROGRESS NOTES
Clinical Pharmacy Appointment    Patient ID: Vandana Cramer is a 64 y.o. female who presents for Weight Loss.    Pt is here for Follow Up appointment.     Referring Provider: Jennifer Agudelo, *  PCP: Jennifer Agudelo MD   Last visit with PCP: 1.10.2025   Next visit with PCP: 7.11.2025    Subjective   Interval History:  Increased semaglutide 0.6 mg weekly  Feeling roth longer after eating, and not thinking about food as much  Has lost 4-5 pounds in first week, then has stalled, now lost another 3-4 pounds this past month  Denies side effects except constipation (takes magnesium) and burping  Reports more energy since starting therapy  Will be taking a trip from May 7-26th - [facility]    HPI  WEIGHT LOSS  BMI Readings from Last 3 Encounters:   01/31/25 32.34 kg/m²   01/22/25 32.34 kg/m²   01/10/25 32.34 kg/m²      Starting weight: 219 lbs. (1.22.2025)  ~215 lbs. (2.19.2025)  Current weight: ~211 lbs> (3.19.2025)    Lifestyle  Diet:   Patient reports does try to endorse in healthy food choices  Trying to decrease carbs and increase protein  Physical Activity:   Patient is active  Does weight training    Non-Pharmacological Therapy  Weight loss techniques attempted:  Self-directed dieting:      Pharmacological Therapy  Current Medications: semaglutide 0.6 mg subcutaneously once weekly on Tuesdays    Insurance coverage of weight-loss medications? No, plan exclusion  Eligible for copay cards/programs? No, government funded insurance    Drug Interactions  No relevant drug interactions were noted.    Objective   Allergies   Allergen Reactions    Meloxicam Other     Elevates blood pressure    Meperidine Unknown    Metformin Unknown    Shellfish Derived Unknown     Social History     Social History Narrative    Not on file      Medication Review  Current Outpatient Medications   Medication Instructions    brompheniramine-pseudoeph-DM (Bromfed DM) 2-30-10 mg/5 mL syrup 5 mL, oral, Every 4 hours  "PRN    mometasone (Elocon) 0.1 % ointment Apply topically twice daily for 2 weeks    rosuvastatin (CRESTOR) 5 mg, oral, Daily    Wegovy 0.25 mg, subcutaneous, Weekly, Inject 0.6 mg subcutaneously once weekly. Qty: 4 pens 0 refills      Vitals  BP Readings from Last 2 Encounters:   01/31/25 148/84   01/10/25 122/74     BMI Readings from Last 1 Encounters:   01/31/25 32.34 kg/m²      Labs  A1C  Lab Results   Component Value Date    HGBA1C 5.5 01/10/2025    HGBA1C 5.5 04/05/2024    HGBA1C 5.6 01/10/2024     BMP  Lab Results   Component Value Date    CALCIUM 10.6 (H) 01/10/2025     01/10/2025    K 4.1 01/10/2025    CO2 28 01/10/2025     01/10/2025    BUN 19 01/10/2025    CREATININE 0.97 01/10/2025    EGFR 65 01/10/2025     LFTs  Lab Results   Component Value Date    ALT 13 01/10/2025    AST 17 01/10/2025    ALKPHOS 68 01/10/2025    BILITOT 0.6 01/10/2025     FLP  Lab Results   Component Value Date    TRIG 127 01/10/2025    CHOL 152 01/10/2025    LDLF 129 (H) 01/31/2023    LDLCALC 65 01/10/2025    HDL 61.3 01/10/2025     Urine Microalbumin  No results found for: \"MICROALBCREA\"  Weight Management  Wt Readings from Last 3 Encounters:   01/31/25 99.3 kg (219 lb)   01/22/25 99.3 kg (219 lb)   01/10/25 99.3 kg (219 lb)      There is no height or weight on file to calculate BMI.     Assessment/Plan   Problem List Items Addressed This Visit       Class 1 obesity with body mass index (BMI) of 30.0 to 30.9 in adult     Spoke with patient today regarding [facility] pharmacy for weight loss medications. Explained to patient that these are not FDA approved and are not given at approved dosing, therefore it cannot be guaranteed that the medication will have the same effectiveness and safety that prescription Ozempic/Wegovy/Mounjaro/Zepbound do. Patient agreeable to cotinue semaglutide today. Counseled patient on MOA, expectations, side effects, duration of therapy, contraindications, administration, and " monitoring parameters. Reviewed no history of MTC nor pancreatitis. Answered all patient questions and concerns; provided [facility] phone number if issues/questions arise.    Advised may not be able to continue semaglutide after April 2025 due to semaglutide being removed from FDA drug shortage list. Discussion surrounding side effects to expect if medication discontinued.     Will send prescription to [facility] pharmacy today for semaglutide 0.6 mg weekly.          Other Visit Diagnoses       Weight gain        BMI 32.0-32.9,adult              Clinical Pharmacist follow-up: 4/30/2025 @ 10 am, Telehealth visit    Continue all meds under the continuation of care with the referring provider and clinical pharmacy team.    Thank you,  Kristy Pop, PharmD  Clinical Pharmacist  [phone]    Verbal consent to manage patient's drug therapy was obtained from the patient. They were informed they may decline to participate or withdraw from participation in pharmacy services at any time.

## 2025-04-08 DIAGNOSIS — E78.2 MIXED HYPERLIPIDEMIA: ICD-10-CM

## 2025-04-08 PROCEDURE — RXMED WILLOW AMBULATORY MEDICATION CHARGE

## 2025-04-08 RX ORDER — ROSUVASTATIN CALCIUM 5 MG/1
5 TABLET, COATED ORAL DAILY
Qty: 90 TABLET | Refills: 0 | Status: SHIPPED | OUTPATIENT
Start: 2025-04-08

## 2025-04-11 ENCOUNTER — PHARMACY VISIT (OUTPATIENT)
Dept: PHARMACY | Facility: CLINIC | Age: 65
End: 2025-04-11
Payer: COMMERCIAL

## 2025-04-25 ENCOUNTER — TELEMEDICINE (OUTPATIENT)
Dept: PHARMACY | Facility: HOSPITAL | Age: 65
End: 2025-04-25
Payer: MEDICARE

## 2025-04-25 DIAGNOSIS — E66.09 CLASS 1 OBESITY DUE TO EXCESS CALORIES WITHOUT SERIOUS COMORBIDITY WITH BODY MASS INDEX (BMI) OF 30.0 TO 30.9 IN ADULT: ICD-10-CM

## 2025-04-25 DIAGNOSIS — J01.00 ACUTE NON-RECURRENT MAXILLARY SINUSITIS: Primary | ICD-10-CM

## 2025-04-25 DIAGNOSIS — E66.811 CLASS 1 OBESITY DUE TO EXCESS CALORIES WITHOUT SERIOUS COMORBIDITY WITH BODY MASS INDEX (BMI) OF 30.0 TO 30.9 IN ADULT: ICD-10-CM

## 2025-04-25 DIAGNOSIS — R63.5 WEIGHT GAIN: ICD-10-CM

## 2025-04-25 PROCEDURE — RXMED WILLOW AMBULATORY MEDICATION CHARGE

## 2025-04-25 RX ORDER — AMOXICILLIN 875 MG/1
875 TABLET, FILM COATED ORAL 2 TIMES DAILY
Qty: 20 TABLET | Refills: 0 | Status: SHIPPED | OUTPATIENT
Start: 2025-04-25 | End: 2025-05-05

## 2025-04-25 RX ORDER — SEMAGLUTIDE 0.25 MG/.5ML
0.25 INJECTION, SOLUTION SUBCUTANEOUS WEEKLY
Qty: 2 ML | Refills: 2 | Status: SHIPPED | OUTPATIENT
Start: 2025-04-25 | End: 2025-07-12

## 2025-04-25 NOTE — PROGRESS NOTES
Clinical Pharmacy Appointment    Patient ID: Vandana Cramer is a 65 y.o. female who presents for Weight Loss.    Pt is here for Follow Up appointment.     Referring Provider: Jennifer Agudelo, *  PCP: Jennifer Agudelo MD   Last visit with PCP: 1.10.2025   Next visit with PCP: 7.11.2025    Subjective   Interval History:  Continues with semaglutide 0.6 mg weekly  Feeling roth longer after eating, and not thinking about food as much  Has lost 4-5 pounds in first week, then has stalled, now lost another 3-4 pounds this past month  Denies side effects except constipation (takes magnesium) and burping  Reports more energy since starting therapy  Will be taking a trip from May 7-26th - [facility]    HPI  WEIGHT LOSS  BMI Readings from Last 3 Encounters:   01/31/25 32.34 kg/m²   01/22/25 32.34 kg/m²   01/10/25 32.34 kg/m²      Starting weight: 219 lbs. (1.22.2025)  ~215 lbs. (2.19.2025)  Current weight: ~211 lbs (3.19.2025)    Lifestyle  Diet:   Patient reports does try to endorse in healthy food choices  Trying to decrease carbs and increase protein  Physical Activity:   Patient is active  Does weight training    Non-Pharmacological Therapy  Weight loss techniques attempted:  Self-directed dieting:      Pharmacological Therapy  Current Medications: semaglutide 0.6 mg subcutaneously once weekly on Tuesdays    Insurance coverage of weight-loss medications? No, plan exclusion  Eligible for copay cards/programs? No, government funded insurance    Drug Interactions  No relevant drug interactions were noted.    Objective   Allergies   Allergen Reactions    Meloxicam Other     Elevates blood pressure    Meperidine Unknown    Metformin Unknown    Shellfish Derived Unknown     Social History     Social History Narrative    Not on file      Medication Review  Current Outpatient Medications   Medication Instructions    amoxicillin (AMOXIL) 875 mg, oral, 2 times daily    amoxicillin-clavulanate  "(Augmentin) 500-125 mg tablet Take 1 tablet (500 mg) by mouth 3 times a day for 7 days    brompheniramine-pseudoeph-DM (Bromfed DM) 2-30-10 mg/5 mL syrup 5 mL, oral, Every 4 hours PRN    mometasone (Elocon) 0.1 % ointment Apply topically twice daily for 2 weeks    rosuvastatin (CRESTOR) 5 mg, oral, Daily    Wegovy 0.25 mg, subcutaneous, Weekly, Inject 0.6 mg subcutaneously once weekly. Qty: 6 pens 0 refills. Please fill 6 pens if able for a 6 week supply      Vitals  BP Readings from Last 2 Encounters:   01/31/25 148/84   01/10/25 122/74     BMI Readings from Last 1 Encounters:   01/31/25 32.34 kg/m²      Labs  A1C  Lab Results   Component Value Date    HGBA1C 5.5 01/10/2025    HGBA1C 5.5 04/05/2024    HGBA1C 5.6 01/10/2024     BMP  Lab Results   Component Value Date    CALCIUM 10.6 (H) 01/10/2025     01/10/2025    K 4.1 01/10/2025    CO2 28 01/10/2025     01/10/2025    BUN 19 01/10/2025    CREATININE 0.97 01/10/2025    EGFR 65 01/10/2025     LFTs  Lab Results   Component Value Date    ALT 13 01/10/2025    AST 17 01/10/2025    ALKPHOS 68 01/10/2025    BILITOT 0.6 01/10/2025     FLP  Lab Results   Component Value Date    TRIG 127 01/10/2025    CHOL 152 01/10/2025    LDLF 129 (H) 01/31/2023    LDLCALC 65 01/10/2025    HDL 61.3 01/10/2025     Urine Microalbumin  No results found for: \"MICROALBCREA\"  Weight Management  Wt Readings from Last 3 Encounters:   01/31/25 99.3 kg (219 lb)   01/22/25 99.3 kg (219 lb)   01/10/25 99.3 kg (219 lb)      There is no height or weight on file to calculate BMI.     Assessment/Plan   Problem List Items Addressed This Visit       Class 1 obesity with body mass index (BMI) of 30.0 to 30.9 in adult    Sp[facility] patient [facility] pharmacy for weight loss medications. Explained to patient that these are not FDA approved and are not given at approved dosing, therefore it cannot be guaranteed that the medication will have the same effectiveness and safety " that prescription Ozempic/Wegovy/Mounjaro/Zepbound do. Patient agreeable to cotinue semaglutide today. Counseled patient on MOA, expectations, side effects, duration of therapy, contraindications, administration, and monitoring parameters. Reviewed no history of MTC nor pancreatitis. Answered all patient questions and concerns; provided [facility] phone number if issues/questions arise.    Patient aware semglutide formulation will change to include B vitamins.     Will send prescription refills to [facility] pharmacy today for semaglutide 0.6 mg weekly.          Other Visit Diagnoses         Weight gain          BMI 32.0-32.9,adult              Clinical Pharmacist follow-up: 6/17/2025 @ 10 am, Telehealth visit    Continue all meds under the continuation of care with the referring provider and clinical pharmacy team.    Thank you,  Kristy Pop, PharmD  Clinical Pharmacist  [phone]    Verbal consent to manage patient's drug therapy was obtained from the patient. They were informed they may decline to participate or withdraw from participation in pharmacy services at any time.

## 2025-04-25 NOTE — ASSESSMENT & PLAN NOTE
Spoke with patient today regarding [facility] pharmacy for weight loss medications. Explained to patient that these are not FDA approved and are not given at approved dosing, therefore it cannot be guaranteed that the medication will have the same effectiveness and safety that prescription Ozempic/Wegovy/Mounjaro/Zepbound do. Patient agreeable to cotinue semaglutide today. Counseled patient on MOA, expectations, side effects, duration of therapy, contraindications, administration, and monitoring parameters. Reviewed no history of MTC nor pancreatitis. Answered all patient questions and concerns; provided [facility] phone number if issues/questions arise.    Patient aware semglutide formulation will change to include B vitamins.     Will send prescription refills to [facility] pharmacy today for semaglutide 0.6 mg weekly.

## 2025-04-29 ENCOUNTER — PHARMACY VISIT (OUTPATIENT)
Dept: PHARMACY | Facility: CLINIC | Age: 65
End: 2025-04-29
Payer: COMMERCIAL

## 2025-04-30 ENCOUNTER — APPOINTMENT (OUTPATIENT)
Dept: PHARMACY | Facility: HOSPITAL | Age: 65
End: 2025-04-30
Payer: MEDICARE

## 2025-06-03 RX ORDER — SEMAGLUTIDE 0.25 MG/.5ML
0.25 INJECTION, SOLUTION SUBCUTANEOUS WEEKLY
Qty: 2 ML | Refills: 2 | Status: SHIPPED | OUTPATIENT
Start: 2025-06-03 | End: 2025-08-26

## 2025-06-03 NOTE — ASSESSMENT & PLAN NOTE
Current regimen includes Semaglutide 0.6 mg once weekly. Patient reports continued weight loss and side effects have been tolerable. Patient's current weight reported as ~203 lbs. Has lost 16 lbs (7.3% of TBW) since starting therapy plan. Due to continue weight loss and appetite suppression, plan to continue with current dose of Semaglutide and re-evaluate at next visit.    Medication Changes: None    CONTINUE  Semaglutide 0.6 mg once weekly on Tuesdays (refills sent to [facility])    Future Considerations:  Titrate Semaglutide as able    Monitoring and Education:  Spoke with patient today regarding [facility] pharmacy for weight loss medications. Explained to patient that these are not FDA approved and are not given at approved dosing, therefore it cannot be guaranteed that the medication will have the same effectiveness and safety that prescription Ozempic/Wegovy/Mounjaro/Zepbound do. Patient agreeable to proceed with semaglutide.   Counseled patient on MOA, expectations, side effects, duration of therapy, contraindications, administration, and monitoring parameters. Reviewed no history of MTC nor pancreatitis.   Answered all patient questions and concerns; provided [facility] phone number if issues/questions arise.

## 2025-06-03 NOTE — PROGRESS NOTES
Clinical Pharmacy Appointment    Patient ID: Vandana Cramer is a 65 y.o. female who presents for Weight Loss.    Pt is here for Follow Up appointment.      Referring Provider: Jennifer Agudelo  PCP: Jennifer Agudelo MD   Last visit with PCP: 1/10/25  Next visit with PCP: 7/11/25    Subjective   Interval History:  Recently returned from trip to   Continues with Semaglutide 0.6 mg weekly on Tuesdays  Did miss 2 doses while on vacation, has restarted 2 weeks ago  Feeling roth longer after eating, and not thinking about food as much  Estimates has lost a total of 16 pounds since start of therapy  Denies side effects except constipation (takes magnesium) and burping  Reports more energy since starting therapy     WEIGHT LOSS  BMI Readings from Last 3 Encounters:   01/31/25 32.34 kg/m²   01/22/25 32.34 kg/m²   01/10/25 32.34 kg/m²      Starting weight: 219 lbs. (1/22/25)  ~215 lbs. (2/19/25)  ~211 lbs. (3/19/25)  Current weight: ~203 lbs (6/3/25)     Lifestyle  Diet:   Patient reports does try to endorse in healthy food choices  Trying to decrease carbs and increase protein  Physical Activity:   Patient is active  Does weight training     Non-Pharmacological Therapy  Weight loss techniques attempted:  Self-directed dieting: calorie restriction     Pharmacological Therapy  Current Medications: Semaglutide 0.6 mg subcutaneously once weekly on Tuesdays     Insurance coverage of weight-loss medications? No, plan exclusion  Eligible for copay cards/programs? No, government funded insurance     Drug Interactions  No relevant drug interactions were noted.    Objective     LMP  (LMP Unknown)      Labs  Lab Results   Component Value Date    BILITOT 0.6 01/10/2025    CALCIUM 10.6 (H) 01/10/2025    CO2 28 01/10/2025     01/10/2025    CREATININE 0.97 01/10/2025    GLUCOSE 81 01/10/2025    ALKPHOS 68 01/10/2025    K 4.1 01/10/2025    PROT 7.3 01/10/2025     01/10/2025    AST 17 01/10/2025    ALT 13  01/10/2025    BUN 19 01/10/2025    ANIONGAP 10 01/10/2025    ALBUMIN 4.6 01/10/2025    GFRF 63 01/31/2023     Lab Results   Component Value Date    TRIG 127 01/10/2025    CHOL 152 01/10/2025    LDLCALC 65 01/10/2025    HDL 61.3 01/10/2025     Lab Results   Component Value Date    HGBA1C 5.5 01/10/2025       Medications Ordered Prior to Encounter[1]     Assessment/Plan   Problem List Items Addressed This Visit           ICD-10-CM    Class 1 obesity with body mass index (BMI) of 30.0 to 30.9 in adult E66.811, Z68.30    Current regimen includes Semaglutide 0.6 mg once weekly. Patient reports continued weight loss and side effects have been tolerable. Patient's current weight reported as ~203 lbs. Has lost 16 lbs (7.3% of TBW) since starting therapy plan. Due to continue weight loss and appetite suppression, plan to continue with current dose of Semaglutide and re-evaluate at next visit.    Medication Changes: None    CONTINUE  Semaglutide 0.6 mg once weekly on Tuesdays (refills sent to [facility])    Future Considerations:  Titrate Semaglutide as able    Monitoring and Education:  Spoke with patient today regarding [facility] pharmacy for weight loss medications. Explained to patient that these are not FDA approved and are not given at approved dosing, therefore it cannot be guaranteed that the medication will have the same effectiveness and safety that prescription Ozempic/Wegovy/Mounjaro/Zepbound do. Patient agreeable to proceed with semaglutide.   Counseled patient on MOA, expectations, side effects, duration of therapy, contraindications, administration, and monitoring parameters. Reviewed no history of MTC nor pancreatitis.   Answered all patient questions and concerns; provided [facility] phone number if issues/questions arise.          Other Visit Diagnoses         Codes      Weight gain     R63.5      BMI 32.0-32.9,adult     Z68.32          Jeannine Solares, Xiang  Clinical Ambulatory Care Pharmacist  Ph:  [phone]    Continue all meds under the continuation of care with the referring provider and clinical pharmacy team.    Clinical Pharmacist follow up: 7/22/25 or sooner as needed based on clinical intervention  Type of Encounter:  Telephone    Verbal consent to manage patient's drug therapy was obtained from the patient. They were informed they may decline to participate or withdraw from participation in pharmacy services at any time.       [1]   Current Outpatient Medications on File Prior to Visit   Medication Sig Dispense Refill    amoxicillin-clavulanate (Augmentin) 500-125 mg tablet Take 1 tablet (500 mg) by mouth 3 times a day for 7 days 21 tablet 1    brompheniramine-pseudoeph-DM (Bromfed DM) 2-30-10 mg/5 mL syrup Take 5 mL by mouth every 4 hours if needed for allergies, congestion or cough. 120 mL 0    mometasone (Elocon) 0.1 % ointment Apply topically twice daily for 2 weeks (Patient not taking: Reported on 1/31/2025) 45 g 1    rosuvastatin (Crestor) 5 mg tablet Take 1 tablet (5 mg) by mouth once daily. 90 tablet 0    semaglutide, weight loss, (Wegovy) 0.25 mg/0.5 mL pen injector Inject 0.25 mg under the skin 1 (one) time per week for 12 doses. Inject 0.6 mg subcutaneously once weekly. Qty: 4 pens 2 refills. Prefer methyl B12 and niacinamide added to semaglutide by injection for better absorption 2 mL 2     No current facility-administered medications on file prior to visit.

## 2025-06-17 ENCOUNTER — APPOINTMENT (OUTPATIENT)
Dept: PHARMACY | Facility: HOSPITAL | Age: 65
End: 2025-06-17
Payer: MEDICARE

## 2025-06-17 DIAGNOSIS — R63.5 WEIGHT GAIN: ICD-10-CM

## 2025-06-17 DIAGNOSIS — E66.09 CLASS 1 OBESITY DUE TO EXCESS CALORIES WITHOUT SERIOUS COMORBIDITY WITH BODY MASS INDEX (BMI) OF 30.0 TO 30.9 IN ADULT: ICD-10-CM

## 2025-06-17 DIAGNOSIS — E66.811 CLASS 1 OBESITY DUE TO EXCESS CALORIES WITHOUT SERIOUS COMORBIDITY WITH BODY MASS INDEX (BMI) OF 30.0 TO 30.9 IN ADULT: ICD-10-CM

## 2025-07-02 ENCOUNTER — OFFICE VISIT (OUTPATIENT)
Dept: PRIMARY CARE | Facility: CLINIC | Age: 65
End: 2025-07-02
Payer: MEDICARE

## 2025-07-02 VITALS
WEIGHT: 200 LBS | HEIGHT: 69 IN | BODY MASS INDEX: 29.62 KG/M2 | DIASTOLIC BLOOD PRESSURE: 62 MMHG | SYSTOLIC BLOOD PRESSURE: 104 MMHG | OXYGEN SATURATION: 97 % | TEMPERATURE: 97.2 F | HEART RATE: 96 BPM | RESPIRATION RATE: 16 BRPM

## 2025-07-02 DIAGNOSIS — E78.2 MIXED HYPERLIPIDEMIA: ICD-10-CM

## 2025-07-02 DIAGNOSIS — R63.5 WEIGHT GAIN: Primary | ICD-10-CM

## 2025-07-02 DIAGNOSIS — R73.9 HYPERGLYCEMIA: ICD-10-CM

## 2025-07-02 PROCEDURE — 1159F MED LIST DOCD IN RCRD: CPT | Performed by: FAMILY MEDICINE

## 2025-07-02 PROCEDURE — 3008F BODY MASS INDEX DOCD: CPT | Performed by: FAMILY MEDICINE

## 2025-07-02 PROCEDURE — 99214 OFFICE O/P EST MOD 30 MIN: CPT | Performed by: FAMILY MEDICINE

## 2025-07-02 PROCEDURE — 1036F TOBACCO NON-USER: CPT | Performed by: FAMILY MEDICINE

## 2025-07-02 PROCEDURE — 1160F RVW MEDS BY RX/DR IN RCRD: CPT | Performed by: FAMILY MEDICINE

## 2025-07-02 PROCEDURE — G2211 COMPLEX E/M VISIT ADD ON: HCPCS | Performed by: FAMILY MEDICINE

## 2025-07-02 RX ORDER — LATANOPROST 50 UG/ML
SOLUTION/ DROPS OPHTHALMIC
COMMUNITY
Start: 2025-06-26

## 2025-07-02 NOTE — PROGRESS NOTES
"Subjective   Patient ID: Vandana Cramer is a 65 y.o. female who presents for   Chief Complaint   Patient presents with    Hyperglycemia    Hyperlipidemia    Weight Management     On wegovy    Eye Problem     Dx with glaucoma-will be getting stents in eyes  Will be having cataract surgery soon    Joint Swelling     Bilateral ankles   Covid vax: UTD  Flu: UTD  Pneumo: UTD  RSV: UTD  Shingles: UTD     CRC: 2017-due 2022  Mammogram: 1/22/2025  Pap: 2020  Lmp: n/a    HPI  Problem List[1]    Surgical History[2]    Review of Systems  This patient has  NO history of seizures/ CAD or CVA    NO history of recent Covid nor flu symptoms,    NO Fever nor chills today,    NO Chest pain, shortness of breath nor paroxysmal nocturnal dyspnea,  NO Nausea, vomiting, nor diarrhea,  NO Hematochezia nor melena,  NO Dysuria, hematuria, nor new incontinence issues  NO new severe headaches nor neurological complaints,  NO new issues with anxiety nor depression nor new psychiatric complaints,  NO suicidal nor homicidal ideations.     OBJECTIVE:  /62   Pulse 96   Temp 36.2 °C (97.2 °F) (Temporal)   Resp 16   Ht 1.753 m (5' 9\")   Wt 90.7 kg (200 lb)   LMP  (LMP Unknown)   SpO2 97%   BMI 29.53 kg/m²      General:  alert, oriented, no acute distress.  No obvious skin rashes noted.       Mood is pleasant,  no signs of emotional distress.   Not appearing intoxicated or altered.   No voiced delusions,   Normal, appropriate behavior.    HEENT: Normocephalic, atraumatic,   Pupils round, reactive to light  Extraocular motions intact and wnl  Tympanic membranes normal    Neck: no nuchal rigidity  No masses palpable.  No carotid bruits.  No thyromegaly.    Respiratory: Equal breath sounds  No wheezes,    rales,    nor rhonchi  No respiratory distress.    Heart: Regular rate and rhythm, no    murmurs  no rubs/gallops    Abdomen: no masses palpable, nontender, no rebound nor guarding.    Extremities: NO cyanosis noted, no clubbing.   No " edema noted.  2+dorsalis pedis pulses.    Normal-not antalgic, steady gait.    No visits with results within 3 Month(s) from this visit.   Latest known visit with results is:   Lab on 01/10/2025   Component Date Value Ref Range Status    Glucose 01/10/2025 81  74 - 99 mg/dL Final    Sodium 01/10/2025 141  136 - 145 mmol/L Final    Potassium 01/10/2025 4.1  3.5 - 5.3 mmol/L Final    Chloride 01/10/2025 107  98 - 107 mmol/L Final    Bicarbonate 01/10/2025 28  21 - 32 mmol/L Final    Anion Gap 01/10/2025 10  10 - 20 mmol/L Final    Urea Nitrogen 01/10/2025 19  6 - 23 mg/dL Final    Creatinine 01/10/2025 0.97  0.50 - 1.05 mg/dL Final    eGFR 01/10/2025 65  >60 mL/min/1.73m*2 Final    Calculations of estimated GFR are performed using the 2021 CKD-EPI Study Refit equation without the race variable for the IDMS-Traceable creatinine methods.  https://jasn.asnjournals.org/content/early/2021/09/22/ASN.8050523068    Calcium 01/10/2025 10.6 (H)  8.6 - 10.3 mg/dL Final    Albumin 01/10/2025 4.6  3.4 - 5.0 g/dL Final    Alkaline Phosphatase 01/10/2025 68  33 - 136 U/L Final    Total Protein 01/10/2025 7.3  6.4 - 8.2 g/dL Final    AST 01/10/2025 17  9 - 39 U/L Final    Bilirubin, Total 01/10/2025 0.6  0.0 - 1.2 mg/dL Final    ALT 01/10/2025 13  7 - 45 U/L Final    Patients treated with Sulfasalazine may generate falsely decreased results for ALT.    Hemoglobin A1C 01/10/2025 5.5  See comment % Final    Estimated Average Glucose 01/10/2025 111  Not Established mg/dL Final    Cholesterol 01/10/2025 152  0 - 199 mg/dL Final          Age      Desirable   Borderline High   High     0-19 Y     0 - 169       170 - 199     >/= 200    20-24 Y     0 - 189       190 - 224     >/= 225         >24 Y     0 - 199       200 - 239     >/= 240   **All ranges are based on fasting samples. Specific   therapeutic targets will vary based on patient-specific   cardiac risk.    Pediatric guidelines reference:Pediatrics 2011, 128(S5).Adult guidelines  reference: NCEP ATPIII Guidelines,SCOTT 2001, 258:2486-97    Venipuncture immediately after or during the administration of Metamizole may lead to falsely low results. Testing should be performed immediately prior to Metamizole dosing.    HDL-Cholesterol 01/10/2025 61.3  mg/dL Final      Age       Very Low   Low     Normal    High    0-19 Y    < 35      < 40     40-45     ----  20-24 Y    ----     < 40      >45      ----        >24 Y      ----     < 40     40-60      >60      Cholesterol/HDL Ratio 01/10/2025 2.5   Final      Ref Values  Desirable  < 3.4  High Risk  > 5.0    LDL Calculated 01/10/2025 65  <=99 mg/dL Final                                Near   Borderline      AGE      Desirable  Optimal    High     High     Very High     0-19 Y     0 - 109     ---    110-129   >/= 130     ----    20-24 Y     0 - 119     ---    120-159   >/= 160     ----      >24 Y     0 -  99   100-129  130-159   160-189     >/=190      VLDL 01/10/2025 25  0 - 40 mg/dL Final    Triglycerides 01/10/2025 127  0 - 149 mg/dL Final    Age              Desirable        Borderline         High        Very High  SEX:B           mg/dL             mg/dL               mg/dL      mg/dL  <=14D                       ----               ----        ----  15D-365D                    ----               ----        ----  1Y-9Y           0-74               75-99             >=100       ----  10Y-19Y        0-89                            >=130       ----  20Y-24Y        0-114             115-149             >=150      ----  >= 25Y         0-149             150-199             200-499    >=500      Venipuncture immediately after or during the administration of Metamizole may lead to falsely low results. Testing should be performed immediately prior to Metamizole dosing.    Non HDL Cholesterol 01/10/2025 91  0 - 149 mg/dL Final          Age       Desirable   Borderline High   High     Very High     0-19 Y     0 - 119       120 - 144     >/=  145    >/= 160    20-24 Y     0 - 149       150 - 189     >/= 190      ----         >24 Y    30 mg/dL above LDL Cholesterol goal      WBC 01/10/2025 6.1  4.4 - 11.3 x10*3/uL Final    nRBC 01/10/2025 0.0  0.0 - 0.0 /100 WBCs Final    RBC 01/10/2025 4.44  4.00 - 5.20 x10*6/uL Final    Hemoglobin 01/10/2025 13.5  12.0 - 16.0 g/dL Final    Hematocrit 01/10/2025 40.8  36.0 - 46.0 % Final    MCV 01/10/2025 92  80 - 100 fL Final    MCH 01/10/2025 30.4  26.0 - 34.0 pg Final    MCHC 01/10/2025 33.1  32.0 - 36.0 g/dL Final    RDW 01/10/2025 13.3  11.5 - 14.5 % Final    Platelets 01/10/2025 259  150 - 450 x10*3/uL Final    Neutrophils % 01/10/2025 56.7  40.0 - 80.0 % Final    Immature Granulocytes %, Automated 01/10/2025 0.2  0.0 - 0.9 % Final    Immature Granulocyte Count (IG) includes promyelocytes, myelocytes and metamyelocytes but does not include bands. Percent differential counts (%) should be interpreted in the context of the absolute cell counts (cells/UL).    Lymphocytes % 01/10/2025 33.2  13.0 - 44.0 % Final    Monocytes % 01/10/2025 6.6  2.0 - 10.0 % Final    Eosinophils % 01/10/2025 2.3  0.0 - 6.0 % Final    Basophils % 01/10/2025 1.0  0.0 - 2.0 % Final    Neutrophils Absolute 01/10/2025 3.45  1.20 - 7.70 x10*3/uL Final    Percent differential counts (%) should be interpreted in the context of the absolute cell counts (cells/uL).    Immature Granulocytes Absolute, Au* 01/10/2025 0.01  0.00 - 0.70 x10*3/uL Final    Lymphocytes Absolute 01/10/2025 2.02  1.20 - 4.80 x10*3/uL Final    Monocytes Absolute 01/10/2025 0.40  0.10 - 1.00 x10*3/uL Final    Eosinophils Absolute 01/10/2025 0.14  0.00 - 0.70 x10*3/uL Final    Basophils Absolute 01/10/2025 0.06  0.00 - 0.10 x10*3/uL Final    Thyroid Stimulating Hormone 01/10/2025 1.77  0.44 - 3.98 mIU/L Final    Thyroxine, Free 01/10/2025 0.89  0.61 - 1.12 ng/dL Final        Assessment/Plan     Problem List Items Addressed This Visit       Hyperglycemia    Relevant Orders     Comprehensive Metabolic Panel    Hemoglobin A1C    Thyroid Stimulating Hormone    Thyroxine, Free    Mixed hyperlipidemia    Relevant Orders    Comprehensive Metabolic Panel    Hemoglobin A1C    Thyroid Stimulating Hormone    Thyroxine, Free     Other Visit Diagnoses         Weight gain    -  Primary    Relevant Orders    Comprehensive Metabolic Panel    Hemoglobin A1C    Thyroid Stimulating Hormone    Thyroxine, Free      BMI 29.0-29.9,adult        Relevant Orders    Comprehensive Metabolic Panel    Hemoglobin A1C    Thyroid Stimulating Hormone    Thyroxine, Free          She is down 20#  Labs due  Wegovy rba addressed no side effects  Traveling  Advised avoid hanging legs down  Low sodium  Off wegovy for eye surgery  Some anxiety re: upcoming eye surgery d/w pt    Vandana Cramer -be aware that any referrals discussed should be placed today or tests/labs ordered should result in prompt scheduling today.   If not done today-then a phone call for scheduling is expected in a timely manner(within 2 weeks).   If testing is to be done-a result should be available to patient within 2 weeks time unless otherwise specified.   You, the patient or caregiver, are responsible for making sure what was discussed is actually scheduled and completed.  If suboptimal understanding of results of tests or referral reason-a follow up appointment with me should be made.  If above does NOT occur-you are to connect with us for an explanation.    Follow up at next scheduled visit -as planned or directed today.  Sooner if new or unresolved issues of concern.    Vandana Cramer We know you have a choice for your health care, THANK YOU for choosing  and [facility].  We APPRECIATE YOU.  Sincerely,   Jennifer Agudelo MD   (dr. Davis)             [1]   Patient Active Problem List  Diagnosis    Allergic rhinitis    Colon polyps    Arthralgia    Hyperglycemia    Mixed hyperlipidemia    Seasonal allergies    Shoulder pain, bilateral     Tinnitus    Class 1 obesity with body mass index (BMI) of 30.0 to 30.9 in adult    Other unilateral secondary osteoarthritis of knee    Agatston coronary artery calcium score less than 100    Need for vaccination   [2]   Past Surgical History:  Procedure Laterality Date    BREAST BIOPSY Left 2012    Left core bx benign 2011    COLONOSCOPY W/ POLYPECTOMY  2017    1 polyp-due 2022    ENDOMETRIAL ABLATION  2009    JOINT REPLACEMENT  2019    SHOULDER SURGERY Left 2023    bone chip, spur, tear    WISDOM TOOTH EXTRACTION  1976

## 2025-07-02 NOTE — PROGRESS NOTES
Covid vax: UTD  Flu: UTD  Pneumo: UTD  RSV: UTD  Shingles: UTD    CRC: 2017-due 2022  Mammogram: 1/22/2025  Pap: 2020  Lmp: n/a

## 2025-07-07 DIAGNOSIS — E78.2 MIXED HYPERLIPIDEMIA: ICD-10-CM

## 2025-07-07 PROCEDURE — RXMED WILLOW AMBULATORY MEDICATION CHARGE

## 2025-07-07 RX ORDER — ROSUVASTATIN CALCIUM 5 MG/1
5 TABLET, COATED ORAL DAILY
Qty: 90 TABLET | Refills: 3 | Status: SHIPPED | OUTPATIENT
Start: 2025-07-07

## 2025-07-08 ENCOUNTER — PHARMACY VISIT (OUTPATIENT)
Dept: PHARMACY | Facility: CLINIC | Age: 65
End: 2025-07-08
Payer: COMMERCIAL

## 2025-07-08 LAB
BASOPHILS # BLD AUTO: 43 CELLS/UL (ref 0–200)
BASOPHILS NFR BLD AUTO: 0.7 %
EOSINOPHIL # BLD AUTO: 98 CELLS/UL (ref 15–500)
EOSINOPHIL NFR BLD AUTO: 1.6 %
ERYTHROCYTE [DISTWIDTH] IN BLOOD BY AUTOMATED COUNT: 13.4 % (ref 11–15)
HCT VFR BLD AUTO: 41.2 % (ref 35–45)
HGB BLD-MCNC: 13.3 G/DL (ref 11.7–15.5)
LYMPHOCYTES # BLD AUTO: 1708 CELLS/UL (ref 850–3900)
LYMPHOCYTES NFR BLD AUTO: 28 %
MCH RBC QN AUTO: 30.1 PG (ref 27–33)
MCHC RBC AUTO-ENTMCNC: 32.3 G/DL (ref 32–36)
MCV RBC AUTO: 93.2 FL (ref 80–100)
MONOCYTES # BLD AUTO: 409 CELLS/UL (ref 200–950)
MONOCYTES NFR BLD AUTO: 6.7 %
NEUTROPHILS # BLD AUTO: 3843 CELLS/UL (ref 1500–7800)
NEUTROPHILS NFR BLD AUTO: 63 %
PLATELET # BLD AUTO: 278 THOUSAND/UL (ref 140–400)
PMV BLD REES-ECKER: 10.5 FL (ref 7.5–12.5)
RBC # BLD AUTO: 4.42 MILLION/UL (ref 3.8–5.1)
WBC # BLD AUTO: 6.1 THOUSAND/UL (ref 3.8–10.8)

## 2025-07-09 LAB
ALBUMIN SERPL-MCNC: 4.2 G/DL (ref 3.6–5.1)
ALP SERPL-CCNC: 57 U/L (ref 37–153)
ALT SERPL-CCNC: 12 U/L (ref 6–29)
ANION GAP SERPL CALCULATED.4IONS-SCNC: 8 MMOL/L (CALC) (ref 7–17)
AST SERPL-CCNC: 16 U/L (ref 10–35)
BILIRUB SERPL-MCNC: 0.5 MG/DL (ref 0.2–1.2)
BUN SERPL-MCNC: 20 MG/DL (ref 7–25)
CALCIUM SERPL-MCNC: 10.4 MG/DL (ref 8.6–10.4)
CHLORIDE SERPL-SCNC: 108 MMOL/L (ref 98–110)
CO2 SERPL-SCNC: 27 MMOL/L (ref 20–32)
CREAT SERPL-MCNC: 0.89 MG/DL (ref 0.5–1.05)
EGFRCR SERPLBLD CKD-EPI 2021: 71 ML/MIN/1.73M2
EST. AVERAGE GLUCOSE BLD GHB EST-MCNC: 111 MG/DL
EST. AVERAGE GLUCOSE BLD GHB EST-SCNC: 6.2 MMOL/L
GLUCOSE SERPL-MCNC: 116 MG/DL (ref 65–99)
HBA1C MFR BLD: 5.5 %
POTASSIUM SERPL-SCNC: 4.1 MMOL/L (ref 3.5–5.3)
PROT SERPL-MCNC: 6.8 G/DL (ref 6.1–8.1)
SODIUM SERPL-SCNC: 143 MMOL/L (ref 135–146)
T4 FREE SERPL-MCNC: 1.2 NG/DL (ref 0.8–1.8)
TSH SERPL-ACNC: 1.21 MIU/L (ref 0.4–4.5)

## 2025-07-11 ENCOUNTER — APPOINTMENT (OUTPATIENT)
Dept: PRIMARY CARE | Facility: CLINIC | Age: 65
End: 2025-07-11
Payer: MEDICARE

## 2025-07-22 ENCOUNTER — APPOINTMENT (OUTPATIENT)
Dept: PHARMACY | Facility: HOSPITAL | Age: 65
End: 2025-07-22
Payer: MEDICARE

## 2025-07-22 DIAGNOSIS — E66.09 CLASS 1 OBESITY DUE TO EXCESS CALORIES WITHOUT SERIOUS COMORBIDITY WITH BODY MASS INDEX (BMI) OF 30.0 TO 30.9 IN ADULT: Primary | ICD-10-CM

## 2025-07-22 DIAGNOSIS — E66.811 CLASS 1 OBESITY DUE TO EXCESS CALORIES WITHOUT SERIOUS COMORBIDITY WITH BODY MASS INDEX (BMI) OF 30.0 TO 30.9 IN ADULT: Primary | ICD-10-CM

## 2025-07-25 NOTE — ASSESSMENT & PLAN NOTE
Current regimen includes Semaglutide 0.6 mg once weekly. Patient reports continued weight loss and side effects have been tolerable. Patient's current weight reported as 200 lbs. Has lost 19 lbs (8.6% of TBW) since starting therapy plan. As patient is currently holding therapy due to cataract surgery, plan to continue with current dose of Semaglutide and restart next week.      Medication Changes: None     CONTINUE  Semaglutide 0.6 mg once weekly (restart next Thursday)     Future Considerations:  Titrate Semaglutide as able     Monitoring and Education:  Spoke with patient today regarding [facility] pharmacy for weight loss medications. Explained to patient that these are not FDA approved and are not given at approved dosing, therefore it cannot be guaranteed that the medication will have the same effectiveness and safety that prescription Ozempic/Wegovy/Mounjaro/Zepbound do. Patient agreeable to proceed with semaglutide.   Counseled patient on MOA, expectations, side effects, duration of therapy, contraindications, administration, and monitoring parameters. Reviewed no history of MTC nor pancreatitis.   Answered all patient questions and concerns; provided [facility] phone number if issues/questions arise.  
2 person assist

## 2025-07-25 NOTE — PROGRESS NOTES
Clinical Pharmacy Appointment    Patient ID: Vandana Cramer is a 65 y.o. female who presents for Weight Loss.    Pt is here for Follow Up appointment.      Referring Provider: Jennifer Agudelo (PCP)  Last visit with PCP: 7/11/25  Next visit with PCP: 7/11/25    Subjective   Interval History:  Recently had cataract surgery on one eye, upcoming surgery next week for other eye  Continues with Semaglutide 0.6 mg weekly on Tuesdays  Currently on hold for the past 3 weeks due to surgery  Was feeling roth longer after eating, and not thinking about food as much  Denies side effects except constipation (takes magnesium) and burping  Reports more energy since starting therapy    WEIGHT LOSS  BMI Readings from Last 3 Encounters:   07/02/25 29.53 kg/m²   01/31/25 32.34 kg/m²   01/22/25 32.34 kg/m²      Starting weight: 219 lbs. (1/22/25)  ~215 lbs. (2/19/25)  ~211 lbs. (3/19/25)  ~203 lbs (6/3/25)  Current weight: 200 lbs (7/2/25)     Lifestyle  Diet:   Patient reports does try to endorse in healthy food choices  Trying to decrease carbs and increase protein  Physical Activity:   Patient is active  Does weight training     Non-Pharmacological Therapy  Weight loss techniques attempted:  Self-directed dieting: calorie restriction     Pharmacological Therapy  Current Medications: Semaglutide 0.6 mg subcutaneously once weekly on Tuesdays (on hold)     Insurance coverage of weight-loss medications? No, plan exclusion  Eligible for copay cards/programs? No, government funded insurance     Drug Interactions  No relevant drug interactions were noted.     Objective     LMP  (LMP Unknown)      Labs  Lab Results   Component Value Date    BILITOT 0.5 07/08/2025    CALCIUM 10.4 07/08/2025    CO2 27 07/08/2025     07/08/2025    CREATININE 0.89 07/08/2025    GLUCOSE 116 (H) 07/08/2025    ALKPHOS 57 07/08/2025    K 4.1 07/08/2025    PROT 6.8 07/08/2025     07/08/2025    AST 16 07/08/2025    ALT 12 07/08/2025    BUN 20  07/08/2025    ANIONGAP 8 07/08/2025    ALBUMIN 4.2 07/08/2025    GFRF 63 01/31/2023     Lab Results   Component Value Date    TRIG 127 01/10/2025    CHOL 152 01/10/2025    LDLCALC 65 01/10/2025    HDL 61.3 01/10/2025     Lab Results   Component Value Date    HGBA1C 5.5 07/08/2025       Medications Ordered Prior to Encounter[1]     Assessment/Plan   Problem List Items Addressed This Visit           ICD-10-CM    Class 1 obesity with body mass index (BMI) of 30.0 to 30.9 in adult - Primary E66.811, Z68.30    Current regimen includes Semaglutide 0.6 mg once weekly. Patient reports continued weight loss and side effects have been tolerable. Patient's current weight reported as 200 lbs. Has lost 19 lbs (8.6% of TBW) since starting therapy plan. As patient is currently holding therapy due to cataract surgery, plan to continue with current dose of Semaglutide and restart next week.      Medication Changes: None     CONTINUE  Semaglutide 0.6 mg once weekly (restart next Thursday)     Future Considerations:  Titrate Semaglutide as able     Monitoring and Education:  Spoke with patient today regarding [facility] pharmacy for weight loss medications. Explained to patient that these are not FDA approved and are not given at approved dosing, therefore it cannot be guaranteed that the medication will have the same effectiveness and safety that prescription Ozempic/Wegovy/Mounjaro/Zepbound do. Patient agreeable to proceed with semaglutide.   Counseled patient on MOA, expectations, side effects, duration of therapy, contraindications, administration, and monitoring parameters. Reviewed no history of MTC nor pancreatitis.   Answered all patient questions and concerns; provided [facility] phone number if issues/questions arise.          Jeannine Solares, DianaD  Clinical Ambulatory Care Pharmacist  Ph: [phone]    Continue all meds under the continuation of care with the referring provider and clinical pharmacy team.    Clinical  Pharmacist follow up: 8/19/25 or sooner as needed based on clinical intervention  Type of Encounter:  Telephone    Verbal consent to manage patient's drug therapy was obtained from the patient. They were informed they may decline to participate or withdraw from participation in pharmacy services at any time.       [1]   Current Outpatient Medications on File Prior to Visit   Medication Sig Dispense Refill    latanoprost (Xalatan) 0.005 % ophthalmic solution       mometasone (Elocon) 0.1 % ointment Apply topically twice daily for 2 weeks 45 g 1    rosuvastatin (Crestor) 5 mg tablet Take 1 tablet (5 mg) by mouth once daily. 90 tablet 3    semaglutide, weight loss, (Wegovy) 0.25 mg/0.5 mL pen injector Inject 0.25 mg under the skin 1 (one) time per week. Inject 0.6 mg subcutaneously once weekly. Qty: 4 syringes 2 refills. Prefer methyl B12 and niacinamide added to semaglutide by injection for better absorption 2 mL 2     No current facility-administered medications on file prior to visit.

## 2025-08-12 ENCOUNTER — TELEPHONE (OUTPATIENT)
Dept: GASTROENTEROLOGY | Facility: CLINIC | Age: 65
End: 2025-08-12
Payer: MEDICARE

## 2025-08-19 ENCOUNTER — APPOINTMENT (OUTPATIENT)
Dept: PHARMACY | Facility: HOSPITAL | Age: 65
End: 2025-08-19
Payer: MEDICARE

## 2025-08-19 DIAGNOSIS — E66.09 CLASS 1 OBESITY DUE TO EXCESS CALORIES WITHOUT SERIOUS COMORBIDITY WITH BODY MASS INDEX (BMI) OF 30.0 TO 30.9 IN ADULT: ICD-10-CM

## 2025-08-19 DIAGNOSIS — E66.811 CLASS 1 OBESITY DUE TO EXCESS CALORIES WITHOUT SERIOUS COMORBIDITY WITH BODY MASS INDEX (BMI) OF 30.0 TO 30.9 IN ADULT: ICD-10-CM

## 2025-08-19 RX ORDER — SEMAGLUTIDE 1 MG/.5ML
1 INJECTION, SOLUTION SUBCUTANEOUS WEEKLY
Qty: 2 ML | Refills: 2 | Status: SHIPPED | OUTPATIENT
Start: 2025-08-19

## 2025-08-27 PROBLEM — H40.9 GLAUCOMA: Status: ACTIVE | Noted: 2025-07-01

## 2025-08-27 PROBLEM — H26.9 ACQUIRED CATARACT: Status: ACTIVE | Noted: 2025-08-27

## 2025-08-28 ENCOUNTER — APPOINTMENT (OUTPATIENT)
Dept: GASTROENTEROLOGY | Facility: CLINIC | Age: 65
End: 2025-08-28
Payer: MEDICARE

## 2025-08-28 VITALS
BODY MASS INDEX: 29.92 KG/M2 | SYSTOLIC BLOOD PRESSURE: 128 MMHG | HEART RATE: 93 BPM | WEIGHT: 202 LBS | DIASTOLIC BLOOD PRESSURE: 86 MMHG | RESPIRATION RATE: 16 BRPM | HEIGHT: 69 IN | OXYGEN SATURATION: 96 %

## 2025-08-28 DIAGNOSIS — Z12.11 COLON CANCER SCREENING: Primary | ICD-10-CM

## 2025-08-28 DIAGNOSIS — Z80.0 FAMILY HISTORY OF COLON CANCER: ICD-10-CM

## 2025-08-28 DIAGNOSIS — Z86.0100 PERSONAL HISTORY OF COLON POLYPS, UNSPECIFIED: ICD-10-CM

## 2025-08-28 PROCEDURE — 3008F BODY MASS INDEX DOCD: CPT | Performed by: NURSE PRACTITIONER

## 2025-08-28 PROCEDURE — 99203 OFFICE O/P NEW LOW 30 MIN: CPT | Performed by: NURSE PRACTITIONER

## 2025-08-28 PROCEDURE — 1036F TOBACCO NON-USER: CPT | Performed by: NURSE PRACTITIONER

## 2025-08-28 PROCEDURE — 1160F RVW MEDS BY RX/DR IN RCRD: CPT | Performed by: NURSE PRACTITIONER

## 2025-08-28 PROCEDURE — 1159F MED LIST DOCD IN RCRD: CPT | Performed by: NURSE PRACTITIONER

## 2025-08-28 PROCEDURE — RXMED WILLOW AMBULATORY MEDICATION CHARGE

## 2025-08-28 RX ORDER — SODIUM, POTASSIUM,MAG SULFATES 17.5-3.13G
SOLUTION, RECONSTITUTED, ORAL ORAL
Qty: 354 ML | Refills: 0 | Status: SHIPPED | OUTPATIENT
Start: 2025-08-28

## 2025-09-02 ENCOUNTER — PHARMACY VISIT (OUTPATIENT)
Dept: PHARMACY | Facility: CLINIC | Age: 65
End: 2025-09-02
Payer: COMMERCIAL

## 2025-09-16 ENCOUNTER — APPOINTMENT (OUTPATIENT)
Dept: PHARMACY | Facility: HOSPITAL | Age: 65
End: 2025-09-16
Payer: MEDICARE

## 2025-09-17 ENCOUNTER — APPOINTMENT (OUTPATIENT)
Dept: GASTROENTEROLOGY | Facility: CLINIC | Age: 65
End: 2025-09-17
Payer: MEDICARE

## 2026-01-06 ENCOUNTER — APPOINTMENT (OUTPATIENT)
Dept: PRIMARY CARE | Facility: CLINIC | Age: 66
End: 2026-01-06
Payer: MEDICARE

## (undated) DEVICE — PADDING CAST W6INXL4YD RAYON UNDERCAST SOF-ROL

## (undated) DEVICE — TUBE IRRIG L8IN LNG PT W/ CONN FOR PMP SYS REDEUCE

## (undated) DEVICE — GLOVE ORANGE PI 7   MSG9070

## (undated) DEVICE — BLADE SAW W125XL70MM THK064MM CUT THK094MM REPL RECIP DBL

## (undated) DEVICE — BLADE RMR L26MM PAT W/ PILOT H

## (undated) DEVICE — ELECTRODE PT RET AD L9FT HI MOIST COND ADH HYDRGEL CORDED

## (undated) DEVICE — ELASTIC BANDAGE: Brand: DEROYAL

## (undated) DEVICE — FLUID CONTROL SHOULDER DRAPE PACK: Brand: CONVERTORS

## (undated) DEVICE — 5.5 MM CANNULA AND OBTURATOR,                                    CONICAL TIP, DISTAL HOLES

## (undated) DEVICE — SLEEVE TRAC FOAM COBAN DISP FOR 3 PNT SHLDR DISTR SYS STAR

## (undated) DEVICE — CANNULA ARTHSCP L7CM DIA7MM TRNSLUC THRD FLX W/ NO SQUIRT

## (undated) DEVICE — [AGGRESSIVE 6-FLUTE BARREL BUR, ARTHROSCOPIC SHAVER BLADE,  DO NOT RESTERILIZE,  DO NOT USE IF PACKAGE IS DAMAGED,  KEEP DRY,  KEEP AWAY FROM SUNLIGHT]: Brand: FORMULA

## (undated) DEVICE — MEDI-VAC TUBING CONNECT "T" POLYPROPYLENE: Brand: CARDINAL HEALTH

## (undated) DEVICE — KNEE ARTHROSCOPY II-LF: Brand: MEDLINE INDUSTRIES, INC.

## (undated) DEVICE — CHLORAPREP 26ML ORANGE

## (undated) DEVICE — 3M™ STERI-DRAPE™ U-DRAPE 1015: Brand: STERI-DRAPE™

## (undated) DEVICE — MEDI-VAC NON-CONDUCTIVE SUCTION TUBING: Brand: CARDINAL HEALTH

## (undated) DEVICE — T4 HOOD

## (undated) DEVICE — 4-PORT MANIFOLD: Brand: NEPTUNE 2

## (undated) DEVICE — STERILE PATIENT PROTECTIVE PAD FOR IMP® KNEE POSITIONERS & COHESIVE WRAP (10 / CASE): Brand: DE MAYO KNEE POSITIONER®

## (undated) DEVICE — AMBIENT SUPER TURBOVAC 90: Brand: COBLATION

## (undated) DEVICE — Z DISCONTINUED PER MEDLINE USE 2741944 DRESSING AQUACEL 12 IN SURG W9XL30CM SIL CVR WTRPRF VIR BACT BARR ANTIMIC

## (undated) DEVICE — SUTURE MCRYL SZ 4-0 L27IN ABSRB UD L19MM PS-2 1/2 CIR PRIM Y426H

## (undated) DEVICE — 35 ML SYRINGE LUER-LOCK TIP: Brand: MONOJECT

## (undated) DEVICE — SPLINT KNEE UNIV FOR LESS THAN 36IN L20IN FOAM LAM E CNTCT

## (undated) DEVICE — 3M™ MICROFOAM™ TAPE 1528-4: Brand: 3M™ MICROFOAM™

## (undated) DEVICE — PAD,ABDOMINAL,8"X10",ST,LF: Brand: MEDLINE

## (undated) DEVICE — SUTURE NONABSORBABLE MONOFILAMENT 3-0 PS-1 18 IN BLK ETHILON 1663H

## (undated) DEVICE — OMNI JUGS® LARGE VOLUME CANISTER WITH SOCK: Brand: DEROYAL

## (undated) DEVICE — PACK PROCEDURE SURG TOTAL KNEE PACK

## (undated) DEVICE — GLOVE SURG SZ 75 L12IN FNGR THK94MIL STD WHT LTX FREE

## (undated) DEVICE — BLADE SAW RESECTOR CUT 4MM

## (undated) DEVICE — BASIC SINGLE BASIN-LF: Brand: MEDLINE INDUSTRIES, INC.

## (undated) DEVICE — SUTURE VCRL + SZ 1 L27IN ABSRB UD CT-1 L36MM 1/2 CIR TAPR VCPP40D

## (undated) DEVICE — TIBURON EXTREMITY SHEET: Brand: CONVERTORS

## (undated) DEVICE — DRESSING GZ W1XL8IN COT XRFRM N ADH OVERWRAP CURAD

## (undated) DEVICE — SUTURE TIGERTAPE TIGERWIRE SZ 2-0 L30IN NONABSORBABLE AR72377T

## (undated) DEVICE — SUTURE NONABSORBABLE BRAIDED 1.3 MM W/ LOOP WHT TIGERLINK

## (undated) DEVICE — SIPS DUAL 2 MINUTE TIP

## (undated) DEVICE — SUTURE VCRL SZ 2-0 L36IN ABSRB UD L36MM CT-1 1/2 CIR J945H

## (undated) DEVICE — GLOVE ORANGE PI 7 1/2   MSG9075

## (undated) DEVICE — JIG SURG RT KNEE AREF PT SPEC DISP PERSONA
Type: IMPLANTABLE DEVICE | Site: KNEE | Status: NON-FUNCTIONAL
Removed: 2019-06-11

## (undated) DEVICE — SUTURE PROL SZ 0 L30IN NONABSORBABLE BLU L36MM CT-1 1/2 CIR 8424H

## (undated) DEVICE — SCREW BNE L25MM DIA2.5MM KNEE FULL THRD HEX FEM PERSONA
Type: IMPLANTABLE DEVICE | Site: KNEE | Status: NON-FUNCTIONAL
Removed: 2019-06-11

## (undated) DEVICE — TUBING PMP L8FT LNG W/ CONN FOR AR-6400 REDEUCE

## (undated) DEVICE — GLOVE SURG SZ 7 L12IN FNGR THK94MIL TRNSLUC YEL LTX HYDRGEL

## (undated) DEVICE — HYPODERMIC SAFETY NEEDLE: Brand: MAGELLAN

## (undated) DEVICE — GUIDEPIN SURG KT FOR MED SURG LPS-FLEX

## (undated) DEVICE — MAT FLR SURG QUICKWICK 28X54 IN DISP

## (undated) DEVICE — ZIMMER® STERILE DISPOSABLE TOURNIQUET CUFF, DUAL PORT, SINGLE BLADDER, 34 IN. (86 CM)

## (undated) DEVICE — Device: Brand: STABLECUT®